# Patient Record
Sex: FEMALE | Race: WHITE | NOT HISPANIC OR LATINO | Employment: FULL TIME | URBAN - METROPOLITAN AREA
[De-identification: names, ages, dates, MRNs, and addresses within clinical notes are randomized per-mention and may not be internally consistent; named-entity substitution may affect disease eponyms.]

---

## 2017-01-17 ENCOUNTER — PRENATAL OFFICE VISIT - HEALTHEAST (OUTPATIENT)
Dept: MIDWIFE SERVICES | Facility: CLINIC | Age: 27
End: 2017-01-17

## 2017-01-17 DIAGNOSIS — Z34.00 SUPERVISION OF NORMAL FIRST PREGNANCY: ICD-10-CM

## 2017-01-17 ASSESSMENT — MIFFLIN-ST. JEOR: SCORE: 1273.13

## 2017-02-17 ENCOUNTER — HOSPITAL ENCOUNTER (OUTPATIENT)
Dept: ULTRASOUND IMAGING | Facility: CLINIC | Age: 27
Discharge: HOME | End: 2017-02-17
Attending: MIDWIFE

## 2017-02-17 DIAGNOSIS — Z34.00 SUPERVISION OF NORMAL FIRST PREGNANCY: ICD-10-CM

## 2017-02-20 ENCOUNTER — AMBULATORY - HEALTHEAST (OUTPATIENT)
Dept: OBGYN | Facility: CLINIC | Age: 27
End: 2017-02-20

## 2017-02-27 ENCOUNTER — PRENATAL OFFICE VISIT - HEALTHEAST (OUTPATIENT)
Dept: MIDWIFE SERVICES | Facility: CLINIC | Age: 27
End: 2017-02-27

## 2017-02-27 DIAGNOSIS — J06.9 UPPER RESPIRATORY INFECTION: ICD-10-CM

## 2017-02-27 DIAGNOSIS — Z34.02 ENCOUNTER FOR SUPERVISION OF NORMAL FIRST PREGNANCY IN SECOND TRIMESTER: ICD-10-CM

## 2017-02-27 ASSESSMENT — MIFFLIN-ST. JEOR: SCORE: 1320.76

## 2017-03-20 ENCOUNTER — PRENATAL OFFICE VISIT - HEALTHEAST (OUTPATIENT)
Dept: MIDWIFE SERVICES | Facility: CLINIC | Age: 27
End: 2017-03-20

## 2017-03-20 DIAGNOSIS — Z34.02 ENCOUNTER FOR SUPERVISION OF NORMAL FIRST PREGNANCY IN SECOND TRIMESTER: ICD-10-CM

## 2017-03-20 ASSESSMENT — MIFFLIN-ST. JEOR: SCORE: 1347.97

## 2017-03-21 LAB — SYPHILIS RPR SCREEN - HISTORICAL: NORMAL

## 2017-04-17 ENCOUNTER — PRENATAL OFFICE VISIT - HEALTHEAST (OUTPATIENT)
Dept: MIDWIFE SERVICES | Facility: CLINIC | Age: 27
End: 2017-04-17

## 2017-04-17 DIAGNOSIS — Z34.02 ENCOUNTER FOR SUPERVISION OF NORMAL FIRST PREGNANCY IN SECOND TRIMESTER: ICD-10-CM

## 2017-04-17 DIAGNOSIS — R05.9 COUGH: ICD-10-CM

## 2017-04-17 ASSESSMENT — MIFFLIN-ST. JEOR: SCORE: 1366.12

## 2017-04-23 ENCOUNTER — COMMUNICATION - HEALTHEAST (OUTPATIENT)
Dept: MIDWIFE SERVICES | Facility: CLINIC | Age: 27
End: 2017-04-23

## 2017-04-24 ENCOUNTER — COMMUNICATION - HEALTHEAST (OUTPATIENT)
Dept: ADMINISTRATIVE | Facility: CLINIC | Age: 27
End: 2017-04-24

## 2017-05-01 ENCOUNTER — PRENATAL OFFICE VISIT - HEALTHEAST (OUTPATIENT)
Dept: MIDWIFE SERVICES | Facility: CLINIC | Age: 27
End: 2017-05-01

## 2017-05-01 DIAGNOSIS — Z34.02 ENCOUNTER FOR SUPERVISION OF NORMAL FIRST PREGNANCY IN SECOND TRIMESTER: ICD-10-CM

## 2017-05-01 ASSESSMENT — MIFFLIN-ST. JEOR: SCORE: 1366.12

## 2017-05-17 ENCOUNTER — PRENATAL OFFICE VISIT - HEALTHEAST (OUTPATIENT)
Dept: MIDWIFE SERVICES | Facility: CLINIC | Age: 27
End: 2017-05-17

## 2017-05-17 DIAGNOSIS — Z34.03 ENCOUNTER FOR SUPERVISION OF NORMAL FIRST PREGNANCY IN THIRD TRIMESTER: ICD-10-CM

## 2017-05-17 ASSESSMENT — MIFFLIN-ST. JEOR: SCORE: 1393.33

## 2017-05-24 ENCOUNTER — PRENATAL OFFICE VISIT - HEALTHEAST (OUTPATIENT)
Dept: MIDWIFE SERVICES | Facility: CLINIC | Age: 27
End: 2017-05-24

## 2017-05-24 ENCOUNTER — HOSPITAL ENCOUNTER (OUTPATIENT)
Dept: ULTRASOUND IMAGING | Facility: CLINIC | Age: 27
Discharge: HOME | End: 2017-05-24
Attending: ADVANCED PRACTICE MIDWIFE

## 2017-05-24 DIAGNOSIS — Z34.03 ENCOUNTER FOR SUPERVISION OF NORMAL FIRST PREGNANCY IN THIRD TRIMESTER: ICD-10-CM

## 2017-05-24 ASSESSMENT — MIFFLIN-ST. JEOR: SCORE: 1406.94

## 2017-05-25 ENCOUNTER — AMBULATORY - HEALTHEAST (OUTPATIENT)
Dept: OBGYN | Facility: CLINIC | Age: 27
End: 2017-05-25

## 2017-06-09 ENCOUNTER — PRENATAL OFFICE VISIT - HEALTHEAST (OUTPATIENT)
Dept: MIDWIFE SERVICES | Facility: CLINIC | Age: 27
End: 2017-06-09

## 2017-06-09 DIAGNOSIS — Z34.03 ENCOUNTER FOR SUPERVISION OF NORMAL FIRST PREGNANCY IN THIRD TRIMESTER: ICD-10-CM

## 2017-06-09 ASSESSMENT — MIFFLIN-ST. JEOR: SCORE: 1418.28

## 2017-06-15 ENCOUNTER — PRENATAL OFFICE VISIT - HEALTHEAST (OUTPATIENT)
Dept: MIDWIFE SERVICES | Facility: CLINIC | Age: 27
End: 2017-06-15

## 2017-06-15 DIAGNOSIS — Z34.03 ENCOUNTER FOR SUPERVISION OF NORMAL FIRST PREGNANCY IN THIRD TRIMESTER: ICD-10-CM

## 2017-06-15 ASSESSMENT — MIFFLIN-ST. JEOR: SCORE: 1425.09

## 2017-06-23 ENCOUNTER — AMBULATORY - HEALTHEAST (OUTPATIENT)
Dept: OBGYN | Facility: CLINIC | Age: 27
End: 2017-06-23

## 2017-06-23 ENCOUNTER — COMMUNICATION - HEALTHEAST (OUTPATIENT)
Dept: OBGYN | Facility: CLINIC | Age: 27
End: 2017-06-23

## 2017-06-23 ENCOUNTER — AMBULATORY - HEALTHEAST (OUTPATIENT)
Dept: MIDWIFE SERVICES | Facility: CLINIC | Age: 27
End: 2017-06-23

## 2017-06-23 ENCOUNTER — HOSPITAL ENCOUNTER (OUTPATIENT)
Dept: ULTRASOUND IMAGING | Facility: CLINIC | Age: 27
Discharge: HOME | End: 2017-06-23
Attending: ADVANCED PRACTICE MIDWIFE

## 2017-06-23 ENCOUNTER — PRENATAL OFFICE VISIT - HEALTHEAST (OUTPATIENT)
Dept: MIDWIFE SERVICES | Facility: CLINIC | Age: 27
End: 2017-06-23

## 2017-06-23 DIAGNOSIS — O48.0 POST-TERM PREGNANCY, 40-42 WEEKS OF GESTATION: ICD-10-CM

## 2017-06-23 DIAGNOSIS — Z21 HIV ANTIBODY POSITIVE (H): ICD-10-CM

## 2017-06-23 DIAGNOSIS — O26.843 SIGNIFICANT DISCREPANCY BETWEEN UTERINE SIZE AND CLINICAL DATES, ANTEPARTUM, THIRD TRIMESTER: ICD-10-CM

## 2017-06-23 DIAGNOSIS — Z34.03 ENCOUNTER FOR SUPERVISION OF NORMAL FIRST PREGNANCY IN THIRD TRIMESTER: ICD-10-CM

## 2017-06-23 ASSESSMENT — MIFFLIN-ST. JEOR: SCORE: 1443.23

## 2017-06-24 ENCOUNTER — COMMUNICATION - HEALTHEAST (OUTPATIENT)
Dept: OBGYN | Facility: CLINIC | Age: 27
End: 2017-06-24

## 2017-06-24 DIAGNOSIS — Z21 HIV ANTIBODY POSITIVE (H): ICD-10-CM

## 2017-06-24 DIAGNOSIS — O26.843 SIGNIFICANT DISCREPANCY BETWEEN UTERINE SIZE AND CLINICAL DATES, ANTEPARTUM, THIRD TRIMESTER: ICD-10-CM

## 2017-06-26 ENCOUNTER — HOME CARE/HOSPICE - HEALTHEAST (OUTPATIENT)
Dept: HOME HEALTH SERVICES | Facility: HOME HEALTH | Age: 27
End: 2017-06-26

## 2017-06-27 ENCOUNTER — HOME CARE/HOSPICE - HEALTHEAST (OUTPATIENT)
Dept: HOME HEALTH SERVICES | Facility: HOME HEALTH | Age: 27
End: 2017-06-27

## 2017-06-28 ENCOUNTER — HOME CARE/HOSPICE - HEALTHEAST (OUTPATIENT)
Dept: HOME HEALTH SERVICES | Facility: HOME HEALTH | Age: 27
End: 2017-06-28

## 2017-07-18 ENCOUNTER — COMMUNICATION - HEALTHEAST (OUTPATIENT)
Dept: MIDWIFE SERVICES | Facility: CLINIC | Age: 27
End: 2017-07-18

## 2017-07-31 ENCOUNTER — OFFICE VISIT - HEALTHEAST (OUTPATIENT)
Dept: MIDWIFE SERVICES | Facility: CLINIC | Age: 27
End: 2017-07-31

## 2017-07-31 DIAGNOSIS — Z30.09 BIRTH CONTROL COUNSELING: ICD-10-CM

## 2017-07-31 ASSESSMENT — MIFFLIN-ST. JEOR: SCORE: 1316.22

## 2018-01-21 ENCOUNTER — HEALTH MAINTENANCE LETTER (OUTPATIENT)
Age: 28
End: 2018-01-21

## 2018-07-30 ENCOUNTER — PRENATAL OFFICE VISIT - HEALTHEAST (OUTPATIENT)
Dept: MIDWIFE SERVICES | Facility: CLINIC | Age: 28
End: 2018-07-30

## 2018-07-30 DIAGNOSIS — Z34.81 ENCOUNTER FOR SUPERVISION OF OTHER NORMAL PREGNANCY IN FIRST TRIMESTER: ICD-10-CM

## 2018-07-30 DIAGNOSIS — R11.0 NAUSEA: ICD-10-CM

## 2018-07-30 DIAGNOSIS — R63.6 UNDERWEIGHT: ICD-10-CM

## 2018-07-30 ASSESSMENT — MIFFLIN-ST. JEOR: SCORE: 1235.71

## 2018-07-31 LAB
BASOPHILS # BLD AUTO: 0 THOU/UL (ref 0–0.2)
BASOPHILS NFR BLD AUTO: 0 % (ref 0–2)
EOSINOPHIL # BLD AUTO: 0.1 THOU/UL (ref 0–0.4)
EOSINOPHIL NFR BLD AUTO: 1 % (ref 0–6)
ERYTHROCYTE [DISTWIDTH] IN BLOOD BY AUTOMATED COUNT: 13.2 % (ref 11–14.5)
HCT VFR BLD AUTO: 40.8 % (ref 35–47)
HGB BLD-MCNC: 13.9 G/DL (ref 12–16)
HIV 1+2 AB+HIV1 P24 AG SERPL QL IA: NEGATIVE
LYMPHOCYTES # BLD AUTO: 1.9 THOU/UL (ref 0.8–4.4)
LYMPHOCYTES NFR BLD AUTO: 31 % (ref 20–40)
MCH RBC QN AUTO: 29.8 PG (ref 27–34)
MCHC RBC AUTO-ENTMCNC: 34.1 G/DL (ref 32–36)
MCV RBC AUTO: 87 FL (ref 80–100)
MONOCYTES # BLD AUTO: 0.6 THOU/UL (ref 0–0.9)
MONOCYTES NFR BLD AUTO: 9 % (ref 2–10)
NEUTROPHILS # BLD AUTO: 3.7 THOU/UL (ref 2–7.7)
NEUTROPHILS NFR BLD AUTO: 59 % (ref 50–70)
PLATELET # BLD AUTO: 217 THOU/UL (ref 140–440)
PMV BLD AUTO: 10.5 FL (ref 8.5–12.5)
RBC # BLD AUTO: 4.67 MILL/UL (ref 3.8–5.4)
WBC: 6.3 THOU/UL (ref 4–11)

## 2018-08-01 ENCOUNTER — HOSPITAL ENCOUNTER (OUTPATIENT)
Dept: ULTRASOUND IMAGING | Facility: CLINIC | Age: 28
Discharge: HOME | End: 2018-08-01
Attending: ADVANCED PRACTICE MIDWIFE

## 2018-08-01 ENCOUNTER — AMBULATORY - HEALTHEAST (OUTPATIENT)
Dept: OBGYN | Facility: CLINIC | Age: 28
End: 2018-08-01

## 2018-08-01 DIAGNOSIS — Z34.81 ENCOUNTER FOR SUPERVISION OF OTHER NORMAL PREGNANCY IN FIRST TRIMESTER: ICD-10-CM

## 2018-08-01 LAB
ABO/RH(D): NORMAL
ABORH REPEAT: NORMAL
ANTIBODY SCREEN: NEGATIVE
BACTERIA SPEC CULT: NO GROWTH
HBV SURFACE AG SERPL QL IA: NEGATIVE
RUBV IGG SERPL QL IA: POSITIVE
T PALLIDUM AB SER QL: NEGATIVE

## 2018-08-02 LAB
HIV 1+2 AB+HIV1P24 AG SERPLBLD IA.RAPID: ABNORMAL
HIV 2 AB SERPLBLD QL IA.RAPID: NONREACTIVE
HIV1 AB SERPLBLD QL IA.RAPID: ABNORMAL

## 2018-08-03 ENCOUNTER — COMMUNICATION - HEALTHEAST (OUTPATIENT)
Dept: ADMINISTRATIVE | Facility: CLINIC | Age: 28
End: 2018-08-03

## 2018-08-03 DIAGNOSIS — O09.899: ICD-10-CM

## 2018-08-06 ENCOUNTER — AMBULATORY - HEALTHEAST (OUTPATIENT)
Dept: LAB | Facility: CLINIC | Age: 28
End: 2018-08-06

## 2018-08-06 DIAGNOSIS — O09.899: ICD-10-CM

## 2018-08-10 LAB
HIV1 RNA # PLAS NAA DL=20: NORMAL {COPIES}/ML
HIV1 RNA SERPL NAA+PROBE-LOG#: NORMAL {LOG_COPIES}/ML

## 2018-09-10 ENCOUNTER — PRENATAL OFFICE VISIT - HEALTHEAST (OUTPATIENT)
Dept: MIDWIFE SERVICES | Facility: CLINIC | Age: 28
End: 2018-09-10

## 2018-09-10 DIAGNOSIS — Z34.82 ENCOUNTER FOR SUPERVISION OF OTHER NORMAL PREGNANCY, SECOND TRIMESTER: ICD-10-CM

## 2018-09-10 ASSESSMENT — MIFFLIN-ST. JEOR: SCORE: 1260.65

## 2018-10-03 ENCOUNTER — HOSPITAL ENCOUNTER (OUTPATIENT)
Dept: ULTRASOUND IMAGING | Facility: CLINIC | Age: 28
Discharge: HOME | End: 2018-10-03
Attending: ADVANCED PRACTICE MIDWIFE

## 2018-10-03 DIAGNOSIS — Z34.82 ENCOUNTER FOR SUPERVISION OF OTHER NORMAL PREGNANCY, SECOND TRIMESTER: ICD-10-CM

## 2018-10-04 ENCOUNTER — AMBULATORY - HEALTHEAST (OUTPATIENT)
Dept: OBGYN | Facility: CLINIC | Age: 28
End: 2018-10-04

## 2018-10-29 ENCOUNTER — PRENATAL OFFICE VISIT - HEALTHEAST (OUTPATIENT)
Dept: MIDWIFE SERVICES | Facility: CLINIC | Age: 28
End: 2018-10-29

## 2018-10-29 DIAGNOSIS — R63.6 UNDERWEIGHT: ICD-10-CM

## 2018-10-29 DIAGNOSIS — Z34.82 ENCOUNTER FOR SUPERVISION OF OTHER NORMAL PREGNANCY, SECOND TRIMESTER: ICD-10-CM

## 2018-10-29 ASSESSMENT — MIFFLIN-ST. JEOR: SCORE: 1321.89

## 2018-11-12 ENCOUNTER — PRENATAL OFFICE VISIT - HEALTHEAST (OUTPATIENT)
Dept: MIDWIFE SERVICES | Facility: CLINIC | Age: 28
End: 2018-11-12

## 2018-11-12 DIAGNOSIS — Z34.82 ENCOUNTER FOR SUPERVISION OF OTHER NORMAL PREGNANCY IN SECOND TRIMESTER: ICD-10-CM

## 2018-11-12 LAB
FASTING STATUS PATIENT QL REPORTED: NO
GLUCOSE 1H P 50 G GLC PO SERPL-MCNC: 155 MG/DL (ref 70–139)
HGB BLD-MCNC: 11.7 G/DL (ref 12–16)

## 2018-11-12 ASSESSMENT — MIFFLIN-ST. JEOR: SCORE: 1343.21

## 2018-11-16 ENCOUNTER — AMBULATORY - HEALTHEAST (OUTPATIENT)
Dept: LAB | Facility: CLINIC | Age: 28
End: 2018-11-16

## 2018-11-16 DIAGNOSIS — R73.09 ELEVATED GLUCOSE: ICD-10-CM

## 2018-11-16 LAB
FASTING STATUS PATIENT QL REPORTED: YES
GLUCOSE 1H P 100 G GLC PO SERPL-MCNC: 150 MG/DL
GLUCOSE 2H P 100 G GLC PO SERPL-MCNC: 120 MG/DL
GLUCOSE 3H P 100 G GLC PO SERPL-MCNC: 96 MG/DL
GLUCOSE P FAST SERPL-MCNC: 62 MG/DL

## 2018-12-17 ENCOUNTER — PRENATAL OFFICE VISIT - HEALTHEAST (OUTPATIENT)
Dept: MIDWIFE SERVICES | Facility: CLINIC | Age: 28
End: 2018-12-17

## 2018-12-17 DIAGNOSIS — N89.8 VAGINAL DISCHARGE: ICD-10-CM

## 2018-12-17 DIAGNOSIS — Z34.83 ENCOUNTER FOR SUPERVISION OF OTHER NORMAL PREGNANCY, THIRD TRIMESTER: ICD-10-CM

## 2018-12-17 LAB
CLUE CELLS: NORMAL
TRICHOMONAS, WET PREP: NORMAL
YEAST, WET PREP: NORMAL

## 2018-12-17 ASSESSMENT — MIFFLIN-ST. JEOR: SCORE: 1383.13

## 2019-01-14 ENCOUNTER — PRENATAL OFFICE VISIT - HEALTHEAST (OUTPATIENT)
Dept: MIDWIFE SERVICES | Facility: CLINIC | Age: 29
End: 2019-01-14

## 2019-01-14 DIAGNOSIS — Z34.83 ENCOUNTER FOR SUPERVISION OF OTHER NORMAL PREGNANCY, THIRD TRIMESTER: ICD-10-CM

## 2019-01-14 ASSESSMENT — MIFFLIN-ST. JEOR: SCORE: 1423.95

## 2019-01-21 ENCOUNTER — PRENATAL OFFICE VISIT - HEALTHEAST (OUTPATIENT)
Dept: MIDWIFE SERVICES | Facility: CLINIC | Age: 29
End: 2019-01-21

## 2019-01-21 DIAGNOSIS — R63.6 UNDERWEIGHT: ICD-10-CM

## 2019-01-21 DIAGNOSIS — Z34.83 ENCOUNTER FOR SUPERVISION OF OTHER NORMAL PREGNANCY, THIRD TRIMESTER: ICD-10-CM

## 2019-01-21 DIAGNOSIS — O26.843 FUNDAL HEIGHT LOW FOR DATES IN THIRD TRIMESTER: ICD-10-CM

## 2019-01-21 LAB — HGB BLD-MCNC: 11.6 G/DL (ref 12–16)

## 2019-01-21 ASSESSMENT — MIFFLIN-ST. JEOR: SCORE: 1426.22

## 2019-01-22 LAB
ALLERGIC TO PENICILLIN: NO
GP B STREP DNA SPEC QL NAA+PROBE: NEGATIVE

## 2019-01-25 ENCOUNTER — HOSPITAL ENCOUNTER (OUTPATIENT)
Dept: ULTRASOUND IMAGING | Facility: CLINIC | Age: 29
Discharge: HOME | End: 2019-01-25
Attending: ADVANCED PRACTICE MIDWIFE

## 2019-01-25 DIAGNOSIS — O26.843 FUNDAL HEIGHT LOW FOR DATES IN THIRD TRIMESTER: ICD-10-CM

## 2019-01-26 ENCOUNTER — AMBULATORY - HEALTHEAST (OUTPATIENT)
Dept: OBGYN | Facility: CLINIC | Age: 29
End: 2019-01-26

## 2019-01-26 ENCOUNTER — COMMUNICATION - HEALTHEAST (OUTPATIENT)
Dept: OBGYN | Facility: CLINIC | Age: 29
End: 2019-01-26

## 2019-01-26 ENCOUNTER — COMMUNICATION - HEALTHEAST (OUTPATIENT)
Dept: MIDWIFE SERVICES | Facility: CLINIC | Age: 29
End: 2019-01-26

## 2019-01-28 ENCOUNTER — PRENATAL OFFICE VISIT - HEALTHEAST (OUTPATIENT)
Dept: MIDWIFE SERVICES | Facility: CLINIC | Age: 29
End: 2019-01-28

## 2019-01-28 DIAGNOSIS — Z34.83 ENCOUNTER FOR SUPERVISION OF OTHER NORMAL PREGNANCY, THIRD TRIMESTER: ICD-10-CM

## 2019-01-28 ASSESSMENT — MIFFLIN-ST. JEOR: SCORE: 1439.83

## 2019-02-04 ENCOUNTER — PRENATAL OFFICE VISIT - HEALTHEAST (OUTPATIENT)
Dept: MIDWIFE SERVICES | Facility: CLINIC | Age: 29
End: 2019-02-04

## 2019-02-04 DIAGNOSIS — Z34.83 ENCOUNTER FOR SUPERVISION OF OTHER NORMAL PREGNANCY, THIRD TRIMESTER: ICD-10-CM

## 2019-02-04 ASSESSMENT — MIFFLIN-ST. JEOR: SCORE: 1433.93

## 2019-02-14 ENCOUNTER — PRENATAL OFFICE VISIT - HEALTHEAST (OUTPATIENT)
Dept: MIDWIFE SERVICES | Facility: CLINIC | Age: 29
End: 2019-02-14

## 2019-02-14 DIAGNOSIS — Z34.83 ENCOUNTER FOR SUPERVISION OF OTHER NORMAL PREGNANCY, THIRD TRIMESTER: ICD-10-CM

## 2019-02-14 ASSESSMENT — MIFFLIN-ST. JEOR: SCORE: 1447.08

## 2019-02-18 ENCOUNTER — HOME CARE/HOSPICE - HEALTHEAST (OUTPATIENT)
Dept: HOME HEALTH SERVICES | Facility: HOME HEALTH | Age: 29
End: 2019-02-18

## 2019-02-21 ENCOUNTER — HOME CARE/HOSPICE - HEALTHEAST (OUTPATIENT)
Dept: HOME HEALTH SERVICES | Facility: HOME HEALTH | Age: 29
End: 2019-02-21

## 2019-02-21 ENCOUNTER — COMMUNICATION - HEALTHEAST (OUTPATIENT)
Dept: MIDWIFE SERVICES | Facility: CLINIC | Age: 29
End: 2019-02-21

## 2019-02-26 ENCOUNTER — OFFICE VISIT - HEALTHEAST (OUTPATIENT)
Dept: MIDWIFE SERVICES | Facility: CLINIC | Age: 29
End: 2019-02-26

## 2019-02-26 ASSESSMENT — MIFFLIN-ST. JEOR: SCORE: 1376.32

## 2019-03-26 ENCOUNTER — COMMUNICATION - HEALTHEAST (OUTPATIENT)
Dept: MIDWIFE SERVICES | Facility: CLINIC | Age: 29
End: 2019-03-26

## 2019-04-03 ENCOUNTER — OFFICE VISIT - HEALTHEAST (OUTPATIENT)
Dept: MIDWIFE SERVICES | Facility: CLINIC | Age: 29
End: 2019-04-03

## 2019-04-03 DIAGNOSIS — Z30.430 ENCOUNTER FOR IUD INSERTION: ICD-10-CM

## 2019-04-03 DIAGNOSIS — Z12.4 PAP SMEAR FOR CERVICAL CANCER SCREENING: ICD-10-CM

## 2019-04-03 ASSESSMENT — MIFFLIN-ST. JEOR: SCORE: 1340.03

## 2019-04-05 ENCOUNTER — AMBULATORY - HEALTHEAST (OUTPATIENT)
Dept: LAB | Facility: CLINIC | Age: 29
End: 2019-04-05

## 2019-04-05 LAB
ALT SERPL W P-5'-P-CCNC: 50 U/L (ref 0–45)
AST SERPL W P-5'-P-CCNC: 28 U/L (ref 0–40)
BUN SERPL-MCNC: 12 MG/DL (ref 8–22)
CREAT SERPL-MCNC: 0.83 MG/DL (ref 0.6–1.1)
ERYTHROCYTE [DISTWIDTH] IN BLOOD BY AUTOMATED COUNT: 11.6 % (ref 11–14.5)
GFR SERPL CREATININE-BSD FRML MDRD: >60 ML/MIN/1.73M2
HCT VFR BLD AUTO: 40.8 % (ref 35–47)
HGB BLD-MCNC: 13.6 G/DL (ref 12–16)
MCH RBC QN AUTO: 29 PG (ref 27–34)
MCHC RBC AUTO-ENTMCNC: 33.4 G/DL (ref 32–36)
MCV RBC AUTO: 87 FL (ref 80–100)
PLATELET # BLD AUTO: 232 THOU/UL (ref 140–440)
PMV BLD AUTO: 7.2 FL (ref 7–10)
RBC # BLD AUTO: 4.69 MILL/UL (ref 3.8–5.4)
WBC: 4.9 THOU/UL (ref 4–11)

## 2019-04-08 LAB
BKR LAB AP ABNORMAL BLEEDING: NO
BKR LAB AP BIRTH CONTROL/HORMONES: NORMAL
BKR LAB AP CERVICAL APPEARANCE: NORMAL
BKR LAB AP GYN ADEQUACY: NORMAL
BKR LAB AP GYN INTERPRETATION: NORMAL
BKR LAB AP HPV REFLEX: NORMAL
BKR LAB AP LMP: NORMAL
BKR LAB AP PATIENT STATUS: NORMAL
BKR LAB AP PREVIOUS ABNORMAL: NORMAL
BKR LAB AP PREVIOUS NORMAL: 2016
HIGH RISK?: NO
PATH REPORT.COMMENTS IMP SPEC: NORMAL
RESULT FLAG (HE HISTORICAL CONVERSION): NORMAL

## 2019-04-29 ENCOUNTER — OFFICE VISIT - HEALTHEAST (OUTPATIENT)
Dept: MIDWIFE SERVICES | Facility: CLINIC | Age: 29
End: 2019-04-29

## 2019-04-29 DIAGNOSIS — Z30.431 ENCOUNTER FOR ROUTINE CHECKING OF INTRAUTERINE CONTRACEPTIVE DEVICE (IUD): ICD-10-CM

## 2019-04-29 DIAGNOSIS — Z97.5 IUD (INTRAUTERINE DEVICE) IN PLACE: ICD-10-CM

## 2019-04-29 DIAGNOSIS — R74.01 ELEVATED ALT MEASUREMENT: ICD-10-CM

## 2019-04-29 LAB
ALBUMIN SERPL-MCNC: 4.1 G/DL (ref 3.5–5)
ALP SERPL-CCNC: 40 U/L (ref 45–120)
ALT SERPL W P-5'-P-CCNC: 110 U/L (ref 0–45)
ANION GAP SERPL CALCULATED.3IONS-SCNC: 12 MMOL/L (ref 5–18)
AST SERPL W P-5'-P-CCNC: 51 U/L (ref 0–40)
BILIRUB SERPL-MCNC: 0.4 MG/DL (ref 0–1)
BUN SERPL-MCNC: 15 MG/DL (ref 8–22)
CALCIUM SERPL-MCNC: 9.9 MG/DL (ref 8.5–10.5)
CHLORIDE BLD-SCNC: 103 MMOL/L (ref 98–107)
CO2 SERPL-SCNC: 25 MMOL/L (ref 22–31)
CREAT SERPL-MCNC: 0.77 MG/DL (ref 0.6–1.1)
GFR SERPL CREATININE-BSD FRML MDRD: >60 ML/MIN/1.73M2
GLUCOSE BLD-MCNC: 75 MG/DL (ref 70–125)
POTASSIUM BLD-SCNC: 4.2 MMOL/L (ref 3.5–5)
PROT SERPL-MCNC: 6.6 G/DL (ref 6–8)
SODIUM SERPL-SCNC: 140 MMOL/L (ref 136–145)

## 2019-04-29 ASSESSMENT — MIFFLIN-ST. JEOR: SCORE: 1320.98

## 2019-04-30 ENCOUNTER — AMBULATORY - HEALTHEAST (OUTPATIENT)
Dept: OBGYN | Facility: CLINIC | Age: 29
End: 2019-04-30

## 2019-04-30 DIAGNOSIS — R74.8 ELEVATED LIVER ENZYMES: ICD-10-CM

## 2019-05-15 ENCOUNTER — OFFICE VISIT - HEALTHEAST (OUTPATIENT)
Dept: INTERNAL MEDICINE | Facility: CLINIC | Age: 29
End: 2019-05-15

## 2019-05-15 DIAGNOSIS — R74.01 ELEVATED ALT MEASUREMENT: ICD-10-CM

## 2019-05-15 ASSESSMENT — MIFFLIN-ST. JEOR: SCORE: 1326.43

## 2019-05-16 ENCOUNTER — HOSPITAL ENCOUNTER (OUTPATIENT)
Dept: ULTRASOUND IMAGING | Facility: CLINIC | Age: 29
Discharge: HOME | End: 2019-05-16

## 2019-05-16 ENCOUNTER — COMMUNICATION - HEALTHEAST (OUTPATIENT)
Dept: INTERNAL MEDICINE | Facility: CLINIC | Age: 29
End: 2019-05-16

## 2019-05-16 DIAGNOSIS — R74.02 ELEVATION OF LEVEL OF TRANSAMINASE AND LACTIC ACID DEHYDROGENASE (LDH): ICD-10-CM

## 2019-05-16 DIAGNOSIS — R74.01 ELEVATION OF LEVEL OF TRANSAMINASE AND LACTIC ACID DEHYDROGENASE (LDH): ICD-10-CM

## 2019-05-16 DIAGNOSIS — R74.01 ELEVATED ALT MEASUREMENT: ICD-10-CM

## 2019-05-16 LAB
ALBUMIN SERPL-MCNC: 4.2 G/DL (ref 3.5–5)
ALP SERPL-CCNC: 43 U/L (ref 45–120)
ALT SERPL W P-5'-P-CCNC: 44 U/L (ref 0–45)
ANION GAP SERPL CALCULATED.3IONS-SCNC: 11 MMOL/L (ref 5–18)
AST SERPL W P-5'-P-CCNC: 27 U/L (ref 0–40)
BILIRUB SERPL-MCNC: 0.2 MG/DL (ref 0–1)
BUN SERPL-MCNC: 12 MG/DL (ref 8–22)
CALCIUM SERPL-MCNC: 9.9 MG/DL (ref 8.5–10.5)
CHLORIDE BLD-SCNC: 104 MMOL/L (ref 98–107)
CO2 SERPL-SCNC: 25 MMOL/L (ref 22–31)
CREAT SERPL-MCNC: 0.81 MG/DL (ref 0.6–1.1)
FERRITIN SERPL-MCNC: 16 NG/ML (ref 10–130)
GFR SERPL CREATININE-BSD FRML MDRD: >60 ML/MIN/1.73M2
GLUCOSE BLD-MCNC: 83 MG/DL (ref 70–125)
IRON SATN MFR SERPL: 30 % (ref 20–50)
IRON SERPL-MCNC: 114 UG/DL (ref 42–175)
POTASSIUM BLD-SCNC: 4.6 MMOL/L (ref 3.5–5)
PROT SERPL-MCNC: 6.6 G/DL (ref 6–8)
SODIUM SERPL-SCNC: 140 MMOL/L (ref 136–145)
TIBC SERPL-MCNC: 385 UG/DL (ref 313–563)
TRANSFERRIN SERPL-MCNC: 308 MG/DL (ref 212–360)
TSH SERPL DL<=0.005 MIU/L-ACNC: 0.94 UIU/ML (ref 0.3–5)

## 2019-05-17 LAB
HBV SURFACE AG SERPL QL IA: NEGATIVE
HCV AB SERPL QL IA: NEGATIVE
HEPATITIS B SURFACE ANTIBODY LHE- HISTORICAL: POSITIVE

## 2019-05-18 LAB — SMA IGG SER-ACNC: 11 UNITS (ref 0–19)

## 2019-05-20 ENCOUNTER — COMMUNICATION - HEALTHEAST (OUTPATIENT)
Dept: INTERNAL MEDICINE | Facility: CLINIC | Age: 29
End: 2019-05-20

## 2019-05-20 DIAGNOSIS — R79.89 ABNORMAL LFTS (LIVER FUNCTION TESTS): ICD-10-CM

## 2019-05-20 LAB
TTG IGA SER-ACNC: <0.1 U/ML
TTG IGG SER-ACNC: <0.6 U/ML

## 2019-06-18 ENCOUNTER — AMBULATORY - HEALTHEAST (OUTPATIENT)
Dept: LAB | Facility: CLINIC | Age: 29
End: 2019-06-18

## 2019-06-18 DIAGNOSIS — R79.89 ABNORMAL LFTS (LIVER FUNCTION TESTS): ICD-10-CM

## 2019-06-18 LAB
ALP SERPL-CCNC: 40 U/L (ref 45–120)
ALT SERPL W P-5'-P-CCNC: 30 U/L (ref 0–45)
AST SERPL W P-5'-P-CCNC: 19 U/L (ref 0–40)

## 2020-03-11 ENCOUNTER — HEALTH MAINTENANCE LETTER (OUTPATIENT)
Age: 30
End: 2020-03-11

## 2020-06-22 ENCOUNTER — RECORDS - HEALTHEAST (OUTPATIENT)
Dept: ADMINISTRATIVE | Facility: OTHER | Age: 30
End: 2020-06-22

## 2020-07-02 ENCOUNTER — RECORDS - HEALTHEAST (OUTPATIENT)
Dept: ADMINISTRATIVE | Facility: OTHER | Age: 30
End: 2020-07-02

## 2020-09-04 ENCOUNTER — OFFICE VISIT - HEALTHEAST (OUTPATIENT)
Dept: MIDWIFE SERVICES | Facility: CLINIC | Age: 30
End: 2020-09-04

## 2020-09-04 DIAGNOSIS — Z30.432 ENCOUNTER FOR IUD REMOVAL: ICD-10-CM

## 2020-09-04 DIAGNOSIS — Z97.5 IUD (INTRAUTERINE DEVICE) IN PLACE: ICD-10-CM

## 2020-09-04 ASSESSMENT — MIFFLIN-ST. JEOR: SCORE: 1312.82

## 2020-09-28 ENCOUNTER — AMBULATORY - HEALTHEAST (OUTPATIENT)
Dept: FAMILY MEDICINE | Facility: CLINIC | Age: 30
End: 2020-09-28

## 2020-09-28 ENCOUNTER — VIRTUAL VISIT (OUTPATIENT)
Dept: FAMILY MEDICINE | Facility: OTHER | Age: 30
End: 2020-09-28
Payer: COMMERCIAL

## 2020-09-28 DIAGNOSIS — Z20.822 SUSPECTED COVID-19 VIRUS INFECTION: ICD-10-CM

## 2020-09-28 PROCEDURE — 99421 OL DIG E/M SVC 5-10 MIN: CPT | Performed by: PHYSICIAN ASSISTANT

## 2020-09-28 NOTE — PROGRESS NOTES
"Date: 2020 14:44:40  Clinician: Abilio Cohen  Clinician NPI: [id number]  Patient: ZAHRA CARDOZA  Patient : 1990  Patient Address: [address] [place], [place] [address] [address]  Patient Phone: [phone]  Visit Protocol: URI  Patient Summary:  ZAHRA is a 30 year old ( : 1990 ) female who initiated a [facility] Visit for COVID-19 (Coronavirus) evaluation and screening. When asked the question \"Please sign me up to receive news, health information and promotions from [facility].\", ZAHRA responded \"No\".    ZAHRA states her symptoms started today.   Her symptoms consist of a cough and a sore throat.   Symptom details     Cough: ZAHRA coughs a few times an hour and her cough is not more bothersome at night. Phlegm comes into her throat when she coughs. She believes her cough is caused by post-nasal drip. The color of the phlegm is clear.     Sore throat: ZAHRA reports having mild throat pain (1-3 on a 10 point pain scale), does not have exudate on her tonsils, and can swallow liquids. The lymph nodes in her neck are not enlarged. A rash has not appeared on the skin since the sore throat started.      ZAHRA denies having nasal congestion, headache, ear pain, anosmia, vomiting, rhinitis, nausea, wheezing, enlarged lymph nodes, facial pain or pressure, fever, myalgias, chills, malaise, teeth pain, ageusia, and diarrhea. She also denies taking antibiotic medication in the past month and having recent facial or sinus surgery in the past 60 days. She is not experiencing dyspnea.   Precipitating events  Within the past week, ZAHRA has not been exposed to someone with strep throat. She has not recently been exposed to someone with influenza. ZAHRA has been in close contact with the following high risk individuals: children under the age of 5.   Pertinent COVID-19 (Coronavirus) information  In the past 14 days, ZAHRA has not worked in a congregate living setting.   She either works or volunteers as a healthcare " worker or a , or works or volunteers in a healthcare facility. 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 She lives with a healthcare worker.   ZAHRA has not had a close contact with a laboratory-confirmed COVID-19 patient within 14 days of symptom onset.   Since December 2019, ZAHRA and has not had upper respiratory infection or influenza-like illness. Has not been diagnosed with lab-confirmed COVID-19 test   Pertinent medical history  ZAHRA does not get yeast infections when she takes antibiotics.   ZAHRA does not need a return to work/school note.   Weight: 130 lbs   ZAHRA does not smoke or use smokeless tobacco.   She is not sure if she is pregnant and denies breastfeeding. Her last period was over a month ago.   Additional information as reported by the patient (free text):  provider is currently being tested for COVID and 1.5 year old in my home is having symptoms of runny nose/cough and being tested.   Weight: 130 lbs    MEDICATIONS: No current medications, ALLERGIES: NKDA  Clinician Response:  Dear ZAHRA,   Your symptoms show that you may have coronavirus (COVID-19). This illness can cause fever, cough and trouble breathing. Many people get a mild case and get better on their own. Some people can get very sick.  What should I do?  We would like to test you for this virus.   1. Please call [phone] to schedule your visit. Explain that you were referred by [facility] to have a COVID-19 test. Be ready to share your [facility] visit ID number.  The following will serve as your written order for this COVID Test, ordered by me, for the indication of suspected COVID [Z20.828]: The test will be ordered in [facility], our electronic health record, after you are scheduled. It will show as ordered and authorized  "by Pino Barrera MD.  Order: COVID-19 (Coronavirus) PCR for SYMPTOMATIC testing from [facility].      2. When it's time for your COVID test:  Stay at least 6 feet away from others. (If someone will drive you to your test, stay in the backseat, as far away from the  as you can.)   Cover your mouth and nose with a mask, tissue or washcloth.  Go straight to the testing site. Don't make any stops on the way there or back.      3.Starting now: Stay home and away from others (self-isolate) until:   You've had no fever---and no medicine that reduces fever---for one full day (24 hours). And...   Your other symptoms have gotten better. For example, your cough or breathing has improved. And...   At least 10 days have passed since your symptoms started.       During this time, don't leave the house except for testing or medical care.   Stay in your own room, even for meals. Use your own bathroom if you can.   Stay away from others in your home. No hugging, kissing or shaking hands. No visitors.  Don't go to work, school or anywhere else.    Clean \"high touch\" surfaces often (doorknobs, counters, handles, etc.). Use a household cleaning spray or wipes. You'll find a full list of  on the EPA website: www.epa.gov/pesticide-registration/list-n-disinfectants-use-against-sars-cov-2.   Cover your mouth and nose with a mask, tissue or washcloth to avoid spreading germs.  Wash your hands and face often. Use soap and water.  Caregivers in these groups are at risk for severe illness due to COVID-19:  o People 65 years and older  o People who live in a nursing home or long-term care facility  o People with chronic disease (lung, heart, cancer, diabetes, kidney, liver, immunologic)  o People who have a weakened immune system, including those who:   Are in cancer treatment  Take medicine that weakens the immune system, such as corticosteroids  Had a bone marrow or organ transplant  Have an immune deficiency  Have poorly controlled " HIV or AIDS  Are obese (body mass index of 40 or higher)  Smoke regularly   o Caregivers should wear gloves while washing dishes, handling laundry and cleaning bedrooms and bathrooms.  o Use caution when washing and drying laundry: Don't shake dirty laundry, and use the warmest water setting that you can.  o For more tips, go to www.cdc.gov/coronavirus/2019-ncov/downloads/10Things.pdf.    How can I take care of myself?    Get lots of rest. Drink extra fluids (unless a doctor has told you not to).   Take Tylenol (acetaminophen) for fever or pain. If you have liver or kidney problems, ask your family doctor if it's okay to take Tylenol.   Adults can take either:    650 mg (two 325 mg pills) every 4 to 6 hours, or...   1,000 mg (two 500 mg pills) every 8 hours as needed.    Note: Don't take more than 3,000 mg in one day. Acetaminophen is found in many medicines (both prescribed and over-the-counter medicines). Read all labels to be sure you don't take too much.   For children, check the Tylenol bottle for the right dose. The dose is based on the child's age or weight.    If you have other health problems (like cancer, heart failure, an organ transplant or severe kidney disease): Call your specialty clinic if you don't feel better in the next 2 days.       Know when to call 911. Emergency warning signs include:    Trouble breathing or shortness of breath Pain or pressure in the chest that doesn't go away Feeling confused like you haven't felt before, or not being able to wake up Bluish-colored lips or face.  Where can I get more information?    [facility] -- About COVID-19: [email or web address]   CDC -- What to Do If You're Sick: www.cdc.gov/coronavirus/2019-ncov/about/steps-when-sick.html   CDC -- Ending Home Isolation: www.cdc.gov/coronavirus/2019-ncov/hcp/disposition-in-home-patients.html   CDC -- Caring for Someone: www.cdc.gov/coronavirus/2019-ncov/if-you-are-sick/care-for-someone.html   [facility] --  Interim Guidance for Hospital Discharge to Home: www.health.[facility].[place]/diseases/coronavirus/hcp/hospdischarge.pdf   [facility] clinical trials (COVID-19 research studies): clinicalaffairs.[facility]/umn-clinical-trials    Below are the COVID-19 hotlines at the [place] Department of Health ([facility]). Interpreters are available.    For health questions: Call 802-123-5993 or 1-283.523.2681 (7 a.m. to 7 p.m.) For questions about schools and childcare: Call 262-616-2259 or 1-178.435.6944 (7 a.m. to 7 p.m.)    Diagnosis: Pain in throat  Diagnosis ICD: R07.0

## 2020-09-29 ENCOUNTER — AMBULATORY - HEALTHEAST (OUTPATIENT)
Dept: FAMILY MEDICINE | Facility: CLINIC | Age: 30
End: 2020-09-29

## 2020-09-29 DIAGNOSIS — Z20.822 SUSPECTED COVID-19 VIRUS INFECTION: ICD-10-CM

## 2020-10-01 ENCOUNTER — COMMUNICATION - HEALTHEAST (OUTPATIENT)
Dept: SCHEDULING | Facility: CLINIC | Age: 30
End: 2020-10-01

## 2020-12-27 ENCOUNTER — HEALTH MAINTENANCE LETTER (OUTPATIENT)
Age: 30
End: 2020-12-27

## 2021-01-22 ENCOUNTER — COMMUNICATION - HEALTHEAST (OUTPATIENT)
Dept: MIDWIFE SERVICES | Facility: CLINIC | Age: 31
End: 2021-01-22

## 2021-01-23 ENCOUNTER — AMBULATORY - HEALTHEAST (OUTPATIENT)
Dept: OBGYN | Facility: CLINIC | Age: 31
End: 2021-01-23

## 2021-01-23 DIAGNOSIS — O21.9 NAUSEA AND VOMITING IN PREGNANCY: ICD-10-CM

## 2021-01-26 ENCOUNTER — COMMUNICATION - HEALTHEAST (OUTPATIENT)
Dept: OBGYN | Facility: CLINIC | Age: 31
End: 2021-01-26

## 2021-02-01 ENCOUNTER — OFFICE VISIT (OUTPATIENT)
Dept: URGENT CARE | Facility: URGENT CARE | Age: 31
End: 2021-02-01
Payer: COMMERCIAL

## 2021-02-01 ENCOUNTER — OFFICE VISIT (OUTPATIENT)
Dept: PEDIATRICS | Facility: CLINIC | Age: 31
End: 2021-02-01
Payer: COMMERCIAL

## 2021-02-01 VITALS
OXYGEN SATURATION: 99 % | HEART RATE: 70 BPM | DIASTOLIC BLOOD PRESSURE: 74 MMHG | SYSTOLIC BLOOD PRESSURE: 109 MMHG | TEMPERATURE: 97.7 F

## 2021-02-01 VITALS
OXYGEN SATURATION: 98 % | DIASTOLIC BLOOD PRESSURE: 71 MMHG | SYSTOLIC BLOOD PRESSURE: 107 MMHG | WEIGHT: 120.6 LBS | TEMPERATURE: 99.1 F | HEART RATE: 84 BPM

## 2021-02-01 DIAGNOSIS — O21.0 HYPEREMESIS GRAVIDARUM: Primary | ICD-10-CM

## 2021-02-01 DIAGNOSIS — Z34.90 PREGNANCY, UNSPECIFIED GESTATIONAL AGE: ICD-10-CM

## 2021-02-01 DIAGNOSIS — R11.2 INTRACTABLE VOMITING WITH NAUSEA, UNSPECIFIED VOMITING TYPE: Primary | ICD-10-CM

## 2021-02-01 DIAGNOSIS — R11.2 INTRACTABLE VOMITING WITH NAUSEA, UNSPECIFIED VOMITING TYPE: ICD-10-CM

## 2021-02-01 DIAGNOSIS — O21.0 HYPEREMESIS GRAVIDARUM: ICD-10-CM

## 2021-02-01 LAB
ALBUMIN UR-MCNC: NEGATIVE MG/DL
ANION GAP SERPL CALCULATED.3IONS-SCNC: 5 MMOL/L (ref 6–17)
APPEARANCE UR: ABNORMAL
BACTERIA #/AREA URNS HPF: ABNORMAL /HPF
BASOPHILS # BLD AUTO: 0 10E9/L (ref 0–0.2)
BASOPHILS NFR BLD AUTO: 0.3 %
BILIRUB UR QL STRIP: NEGATIVE
BUN SERPL-MCNC: 9 MG/DL (ref 7–30)
CALCIUM SERPL-MCNC: 9.4 MG/DL (ref 8.5–10.1)
CHLORIDE SERPL-SCNC: 102 MMOL/L (ref 94–109)
CO2 SERPL-SCNC: 28 MMOL/L (ref 20–32)
COLOR UR AUTO: YELLOW
CREAT SERPL-MCNC: 0.6 MG/DL (ref 0.52–1.04)
DIFFERENTIAL METHOD BLD: NORMAL
EOSINOPHIL # BLD AUTO: 0 10E9/L (ref 0–0.7)
EOSINOPHIL NFR BLD AUTO: 0.3 %
ERYTHROCYTE [DISTWIDTH] IN BLOOD BY AUTOMATED COUNT: 11.7 % (ref 10–15)
GFR SERPL CREATININE-BSD FRML MDRD: >90 ML/MIN/{1.73_M2}
GLUCOSE SERPL-MCNC: 91 MG/DL (ref 70–99)
GLUCOSE UR STRIP-MCNC: NEGATIVE MG/DL
HCT VFR BLD AUTO: 41 % (ref 35–47)
HGB BLD-MCNC: 14.5 G/DL (ref 11.7–15.7)
HGB UR QL STRIP: NEGATIVE
KETONES UR STRIP-MCNC: 15 MG/DL
LEUKOCYTE ESTERASE UR QL STRIP: ABNORMAL
LYMPHOCYTES # BLD AUTO: 1.5 10E9/L (ref 0.8–5.3)
LYMPHOCYTES NFR BLD AUTO: 20.4 %
MCH RBC QN AUTO: 29.9 PG (ref 26.5–33)
MCHC RBC AUTO-ENTMCNC: 35.4 G/DL (ref 31.5–36.5)
MCV RBC AUTO: 85 FL (ref 78–100)
MONOCYTES # BLD AUTO: 0.5 10E9/L (ref 0–1.3)
MONOCYTES NFR BLD AUTO: 6.1 %
NEUTROPHILS # BLD AUTO: 5.4 10E9/L (ref 1.6–8.3)
NEUTROPHILS NFR BLD AUTO: 72.9 %
NITRATE UR QL: NEGATIVE
NON-SQ EPI CELLS #/AREA URNS LPF: ABNORMAL /LPF
PH UR STRIP: 6.5 PH (ref 5–7)
PLATELET # BLD AUTO: 267 10E9/L (ref 150–450)
POTASSIUM SERPL-SCNC: 4 MMOL/L (ref 3.4–5.3)
RBC # BLD AUTO: 4.85 10E12/L (ref 3.8–5.2)
RBC #/AREA URNS AUTO: ABNORMAL /HPF
SODIUM SERPL-SCNC: 135 MMOL/L (ref 133–144)
SOURCE: ABNORMAL
SP GR UR STRIP: 1.02 (ref 1–1.03)
UROBILINOGEN UR STRIP-ACNC: 2 EU/DL (ref 0.2–1)
WBC # BLD AUTO: 7.4 10E9/L (ref 4–11)
WBC #/AREA URNS AUTO: ABNORMAL /HPF

## 2021-02-01 PROCEDURE — 81001 URINALYSIS AUTO W/SCOPE: CPT | Performed by: NURSE PRACTITIONER

## 2021-02-01 PROCEDURE — 85025 COMPLETE CBC W/AUTO DIFF WBC: CPT | Performed by: NURSE PRACTITIONER

## 2021-02-01 PROCEDURE — 36415 COLL VENOUS BLD VENIPUNCTURE: CPT | Performed by: NURSE PRACTITIONER

## 2021-02-01 PROCEDURE — 99207 REFERRAL TO ACUTE AND DIAGNOSTIC SERVICES: CPT | Performed by: NURSE PRACTITIONER

## 2021-02-01 PROCEDURE — 99215 OFFICE O/P EST HI 40 MIN: CPT | Mod: 25 | Performed by: PHYSICIAN ASSISTANT

## 2021-02-01 PROCEDURE — 87086 URINE CULTURE/COLONY COUNT: CPT | Performed by: NURSE PRACTITIONER

## 2021-02-01 PROCEDURE — 80048 BASIC METABOLIC PNL TOTAL CA: CPT | Performed by: NURSE PRACTITIONER

## 2021-02-01 PROCEDURE — 96374 THER/PROPH/DIAG INJ IV PUSH: CPT | Performed by: PHYSICIAN ASSISTANT

## 2021-02-01 RX ORDER — ONDANSETRON 4 MG/1
4 TABLET, FILM COATED ORAL
COMMUNITY
Start: 2021-01-23 | End: 2021-07-12

## 2021-02-01 RX ORDER — THIAMINE HYDROCHLORIDE 100 MG/ML
100 INJECTION, SOLUTION INTRAMUSCULAR; INTRAVENOUS ONCE
Qty: 1 ML | Refills: 0 | Status: CANCELLED
Start: 2021-02-01 | End: 2021-02-01

## 2021-02-01 RX ORDER — ONDANSETRON 2 MG/ML
4 INJECTION INTRAMUSCULAR; INTRAVENOUS ONCE
Status: COMPLETED | OUTPATIENT
Start: 2021-02-01 | End: 2021-02-01

## 2021-02-01 RX ORDER — LANOLIN ALCOHOL/MO/W.PET/CERES
100 CREAM (GRAM) TOPICAL 2 TIMES DAILY
Qty: 10 TABLET | Refills: 0
Start: 2021-02-01 | End: 2021-02-06

## 2021-02-01 RX ADMIN — ONDANSETRON 4 MG: 2 INJECTION INTRAMUSCULAR; INTRAVENOUS at 14:15

## 2021-02-01 SDOH — HEALTH STABILITY: MENTAL HEALTH: HOW MANY STANDARD DRINKS CONTAINING ALCOHOL DO YOU HAVE ON A TYPICAL DAY?: NOT ASKED

## 2021-02-01 SDOH — HEALTH STABILITY: MENTAL HEALTH: HOW OFTEN DO YOU HAVE A DRINK CONTAINING ALCOHOL?: NEVER

## 2021-02-01 SDOH — HEALTH STABILITY: MENTAL HEALTH: HOW OFTEN DO YOU HAVE 6 OR MORE DRINKS ON ONE OCCASION?: NEVER

## 2021-02-01 SDOH — HEALTH STABILITY: MENTAL HEALTH: HOW MANY DRINKS CONTAINING ALCOHOL DO YOU HAVE ON A TYPICAL DAY WHEN YOU ARE DRINKING?: NOT ASKED

## 2021-02-01 SDOH — HEALTH STABILITY: MENTAL HEALTH: HOW OFTEN DO YOU HAVE SIX OR MORE DRINKS ON ONE OCCASION?: NEVER

## 2021-02-01 NOTE — PROGRESS NOTES
Assessment & Plan     Hyperemesis gravidarum  Intractable vomiting with nausea, unspecified vomiting type  Pregnancy, unspecified gestational age  Labs reassuring.  UC pending.  Hydrated with LR x 1L and given 4 mg of Zofran IV, this was helpful in decreasing her nausea.  Recommend continuing oral Zofran and small/frequent hydration and intake efforts.  Also recommend PO thiamine crushed BID x 5 days in applesauce or similar as we were not able to get IM thiamine at the [facility] today.  Pt will follow closely with OBGYN.  - IV access  - sodium chloride (PF) 0.9% PF flush 3 mL  - Referral to Acute and Diagnostic Services (Day of diagnostic / First order acute)  - lactated ringers BOLUS 1,000 mL  - ondansetron (ZOFRAN) injection 4 mg  - vitamin B1 (B-1) 100 MG tablet  Dispense: 10 tablet; Refill: 0      Review of prior external note(s) from - [facility]where information from [facility] reviewed    40 minutes spent on the date of the encounter doing chart review, history and exam, documentation and further activities as noted above        Return in about 1 week (around 2/8/2021) for OBGYN.    SOBEIDA Rendon [facility] CHELSIE Kulkarni is a 30 year old who presents to clinic today for the following health issues  accompanied by her spouse:    HPI       Concern - hyperemesis  Onset: 9 weeks gestation, started vomiting at 6 weeks, worsened over last week, vomiting about 7 times a day, a lot is dry heaves, using zofran and has not been helping recently - last used yesterday.  Has not been able to work x 1 week.  Previous pregnancies had issues and was given Compazine for first baby with moderate relief.  Did not have IVF with previous pregnancies.  Weakness and fatigue, denies mucosal dryness.  Alleviating factors:        Improved by: has not found anything that helps it.  Some days eating can help.  Therapies tried and outcome: None        Review of Systems   Constitutional, HEENT,  cardiovascular, pulmonary, GI, , musculoskeletal, neuro, skin, endocrine and psych systems are negative, except as otherwise noted.      Objective    /74 (BP Location: Right arm, Patient Position: Chair, Cuff Size: Adult Regular)   Pulse 70   Temp 97.7  F (36.5  C) (Oral)   SpO2 99%   There is no height or weight on file to calculate BMI.  Physical Exam   GENERAL: healthy, alert and no distress  EYES: Eyes grossly normal to inspection  RESP: lungs clear to auscultation - no rales, rhonchi or wheezes  CV: regular rate and rhythm, normal S1 S2, no S3 or S4, no murmur, click or rub, no peripheral edema and peripheral pulses strong  ABDOMEN: soft, nontender, no hepatosplenomegaly, no masses and bowel sounds normal  MS: no gross musculoskeletal defects noted, no edema  SKIN: no suspicious lesions or rashes  NEURO: Normal strength and tone, mentation intact and speech normal  PSYCH: mentation appears normal, affect normal/bright    Results for orders placed or performed in visit on 02/01/21 (from the past 24 hour(s))   UA with Microscopic reflex to Culture    Specimen: Midstream Urine   Result Value Ref Range    Color Urine Yellow     Appearance Urine Slightly Cloudy     Glucose Urine Negative NEG^Negative mg/dL    Bilirubin Urine Negative NEG^Negative    Ketones Urine 15 (A) NEG^Negative mg/dL    Specific Gravity Urine 1.020 1.003 - 1.035    pH Urine 6.5 5.0 - 7.0 pH    Protein Albumin Urine Negative NEG^Negative mg/dL    Urobilinogen Urine 2.0 (H) 0.2 - 1.0 EU/dL    Nitrite Urine Negative NEG^Negative    Blood Urine Negative NEG^Negative    Leukocyte Esterase Urine Small (A) NEG^Negative    Source Midstream Urine     WBC Urine 5-10 (A) OTO5^0 - 5 /HPF    RBC Urine O - 2 OTO2^O - 2 /HPF    Squamous Epithelial /LPF Urine Few FEW^Few /LPF    Bacteria Urine Many (A) NEG^Negative /HPF   Basic metabolic panel  (Ca, Cl, CO2, Creat, Gluc, K, Na, BUN)   Result Value Ref Range    Sodium 135 133 - 144 mmol/L     Potassium 4.0 3.4 - 5.3 mmol/L    Chloride 102 94 - 109 mmol/L    Carbon Dioxide 28 20 - 32 mmol/L    Anion Gap 5 (L) 6 - 17 mmol/L    Glucose 91 70 - 99 mg/dL    Urea Nitrogen 9 7 - 30 mg/dL    Creatinine 0.60 0.52 - 1.04 mg/dL    GFR Estimate >90 >60 mL/min/[1.73_m2]    GFR Estimate If Black >90 >60 mL/min/[1.73_m2]    Calcium 9.4 8.5 - 10.1 mg/dL   CBC with platelets and differential   Result Value Ref Range    WBC 7.4 4.0 - 11.0 10e9/L    RBC Count 4.85 3.8 - 5.2 10e12/L    Hemoglobin 14.5 11.7 - 15.7 g/dL    Hematocrit 41.0 35.0 - 47.0 %    MCV 85 78 - 100 fl    MCH 29.9 26.5 - 33.0 pg    MCHC 35.4 31.5 - 36.5 g/dL    RDW 11.7 10.0 - 15.0 %    Platelet Count 267 150 - 450 10e9/L    % Neutrophils 72.9 %    % Lymphocytes 20.4 %    % Monocytes 6.1 %    % Eosinophils 0.3 %    % Basophils 0.3 %    Absolute Neutrophil 5.4 1.6 - 8.3 10e9/L    Absolute Lymphocytes 1.5 0.8 - 5.3 10e9/L    Absolute Monocytes 0.5 0.0 - 1.3 10e9/L    Absolute Eosinophils 0.0 0.0 - 0.7 10e9/L    Absolute Basophils 0.0 0.0 - 0.2 10e9/L    Diff Method Automated Method

## 2021-02-01 NOTE — PROGRESS NOTES
"Chief Complaint   Patient presents with     Dehydration     sx for a week with dehydration, nausea, vomit, \"low urine output\", and fatigue-is 1st trimester      Nausea     Vomiting     Fatigue     Urinary Problem         ICD-10-CM    1. Intractable vomiting with nausea, unspecified vomiting type  R11.2 UA with Microscopic reflex to Culture     Basic metabolic panel  (Ca, Cl, CO2, Creat, Gluc, K, Na, BUN)     CBC with platelets and differential     Urine Culture Aerobic Bacterial     Referral to Acute and Diagnostic Services (Day of diagnostic / First order acute)   2. Hyperemesis gravidarum  O21.0 Referral to Acute and Diagnostic Services (Day of diagnostic / First order acute)     Patient appears mildly dehydrated with dry mucous membranes and ketones present in the urine.  Spoke with [facility] specialty [place] provider and they will accept her to receive IV fluids and assess need for further antiemetics and/or imaging.  She has used Reglan in past pregnancies one with success and the other where it did not help.  Patient's  will drive her to [place].  She will follow-up with OB provider next week.  Urine is not completely declarative for urinary tract infection in this asymptomatic female so culture will be completed.    Medical Decision Making  Differential diagnosis includes but is not limited to viral gastroenteritis, diabetes, appendicitis, infectious process, food allergy, GERD, bowel obstruction, constipation, intussusception, malrotation, urinary tract infection, dehydration    Results for orders placed or performed in visit on 02/01/21 (from the past 24 hour(s))   UA with Microscopic reflex to Culture    Specimen: Midstream Urine   Result Value Ref Range    Color Urine Yellow     Appearance Urine Slightly Cloudy     Glucose Urine Negative NEG^Negative mg/dL    Bilirubin Urine Negative NEG^Negative    Ketones Urine 15 (A) NEG^Negative mg/dL    Specific Gravity Urine 1.020 1.003 - 1.035    pH " Urine 6.5 5.0 - 7.0 pH    Protein Albumin Urine Negative NEG^Negative mg/dL    Urobilinogen Urine 2.0 (H) 0.2 - 1.0 EU/dL    Nitrite Urine Negative NEG^Negative    Blood Urine Negative NEG^Negative    Leukocyte Esterase Urine Small (A) NEG^Negative    Source Midstream Urine     WBC Urine 5-10 (A) OTO5^0 - 5 /HPF    RBC Urine O - 2 OTO2^O - 2 /HPF    Squamous Epithelial /LPF Urine Few FEW^Few /LPF    Bacteria Urine Many (A) NEG^Negative /HPF   CBC with platelets and differential   Result Value Ref Range    WBC 7.4 4.0 - 11.0 10e9/L    RBC Count 4.85 3.8 - 5.2 10e12/L    Hemoglobin 14.5 11.7 - 15.7 g/dL    Hematocrit 41.0 35.0 - 47.0 %    MCV 85 78 - 100 fl    MCH 29.9 26.5 - 33.0 pg    MCHC 35.4 31.5 - 36.5 g/dL    RDW 11.7 10.0 - 15.0 %    Platelet Count 267 150 - 450 10e9/L    % Neutrophils 72.9 %    % Lymphocytes 20.4 %    % Monocytes 6.1 %    % Eosinophils 0.3 %    % Basophils 0.3 %    Absolute Neutrophil 5.4 1.6 - 8.3 10e9/L    Absolute Lymphocytes 1.5 0.8 - 5.3 10e9/L    Absolute Monocytes 0.5 0.0 - 1.3 10e9/L    Absolute Eosinophils 0.0 0.0 - 0.7 10e9/L    Absolute Basophils 0.0 0.0 - 0.2 10e9/L    Diff Method Automated Method        Subjective     Cayla Lerner is an 30 year oldfemale who presents to clinic today for intractable nausea and vomiting that started approximately a week ago.  She has been taking Zofran 4 to 8 mg and initially had some relief but the last several days she has not been able to keep anything down.  She is feeling very fatigued and is 9 weeks pregnant.  This is her third pregnancy and her first appointment with the midwife is next week.  She has not had an ultrasound completed with this pregnancy.  She does have a history of hyperemesis with previous pregnancies.  Last bowel movement was 2 days ago but she has not eaten anything in the last 3 days so does not feel this is surprising.  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.     accompanied by her spouse:     ROS: 10 point  ROS neg other than the symptoms noted above in the HPI.       Objective    /71 (BP Location: Left arm, Patient Position: Sitting, Cuff Size: Adult Regular)   Pulse 84   Temp 99.1  F (37.3  C) (Tympanic)   Wt 54.7 kg (120 lb 9.6 oz)   LMP  (Exact Date)   SpO2 98%     Physical Exam       GENERAL APPEARANCE: alert and fatigued     EYES: PERRL, EOMI, sclera non-icteric     HENT: Mucous membranes are tacky and tongue appears moderately dry     NECK: no adenopathy or asymmetry, thyroid normal to palpation     RESP: lungs clear to auscultation - no rales, rhonchi or wheezes     CV: regular rates and rhythm, no murmurs, rubs, or gallop     ABDOMEN:  soft, nontender, no HSM or masses and bowel sounds normal     MS: extremities normal- no gross deformities noted; normal muscle tone.     SKIN: no suspicious lesions or rashes    Patient Instructions   Go to [facility] in [place] for IV Fluids.          NELDA Benz, CNP  [facility] Provider

## 2021-02-02 LAB
BACTERIA SPEC CULT: NORMAL
Lab: NORMAL
SPECIMEN SOURCE: NORMAL

## 2021-02-11 ENCOUNTER — PRENATAL OFFICE VISIT - HEALTHEAST (OUTPATIENT)
Dept: MIDWIFE SERVICES | Facility: CLINIC | Age: 31
End: 2021-02-11

## 2021-02-11 DIAGNOSIS — O21.9 NAUSEA AND VOMITING OF PREGNANCY, ANTEPARTUM: ICD-10-CM

## 2021-02-11 DIAGNOSIS — Z34.81 ENCOUNTER FOR SUPERVISION OF OTHER NORMAL PREGNANCY IN FIRST TRIMESTER: ICD-10-CM

## 2021-02-11 LAB
BASOPHILS # BLD AUTO: 0 THOU/UL (ref 0–0.2)
BASOPHILS NFR BLD AUTO: 0 % (ref 0–2)
EOSINOPHIL # BLD AUTO: 0 THOU/UL (ref 0–0.4)
EOSINOPHIL NFR BLD AUTO: 1 % (ref 0–6)
ERYTHROCYTE [DISTWIDTH] IN BLOOD BY AUTOMATED COUNT: 12.4 % (ref 11–14.5)
HBV SURFACE AG SERPL QL IA: NEGATIVE
HCT VFR BLD AUTO: 35.7 % (ref 35–47)
HGB BLD-MCNC: 12.6 G/DL (ref 12–16)
HIV 1+2 AB+HIV1 P24 AG SERPL QL IA: NEGATIVE
IMM GRANULOCYTES # BLD: 0 THOU/UL
IMM GRANULOCYTES NFR BLD: 1 %
LYMPHOCYTES # BLD AUTO: 1.4 THOU/UL (ref 0.8–4.4)
LYMPHOCYTES NFR BLD AUTO: 21 % (ref 20–40)
MCH RBC QN AUTO: 30.4 PG (ref 27–34)
MCHC RBC AUTO-ENTMCNC: 35.3 G/DL (ref 32–36)
MCV RBC AUTO: 86 FL (ref 80–100)
MONOCYTES # BLD AUTO: 0.5 THOU/UL (ref 0–0.9)
MONOCYTES NFR BLD AUTO: 8 % (ref 2–10)
NEUTROPHILS # BLD AUTO: 4.7 THOU/UL (ref 2–7.7)
NEUTROPHILS NFR BLD AUTO: 70 % (ref 50–70)
PLATELET # BLD AUTO: 231 THOU/UL (ref 140–440)
PMV BLD AUTO: 9.9 FL (ref 8.5–12.5)
RBC # BLD AUTO: 4.15 MILL/UL (ref 3.8–5.4)
WBC: 6.6 THOU/UL (ref 4–11)

## 2021-02-11 ASSESSMENT — MIFFLIN-ST. JEOR: SCORE: 1294.67

## 2021-02-11 ASSESSMENT — EDINBURGH POSTNATAL DEPRESSION SCALE (EPDS): TOTAL SCORE: 3

## 2021-02-12 LAB
ABO/RH(D): NORMAL
ABORH REPEAT: NORMAL
ALBUMIN SERPL-MCNC: 3.7 G/DL (ref 3.5–5)
ALP SERPL-CCNC: 22 U/L (ref 45–120)
ALT SERPL W P-5'-P-CCNC: 15 U/L (ref 0–45)
ANION GAP SERPL CALCULATED.3IONS-SCNC: 7 MMOL/L (ref 5–18)
ANTIBODY SCREEN: NEGATIVE
AST SERPL W P-5'-P-CCNC: 11 U/L (ref 0–40)
BACTERIA SPEC CULT: NO GROWTH
BILIRUB SERPL-MCNC: 0.3 MG/DL (ref 0–1)
BUN SERPL-MCNC: 10 MG/DL (ref 8–22)
CALCIUM SERPL-MCNC: 8.7 MG/DL (ref 8.5–10.5)
CHLORIDE BLD-SCNC: 104 MMOL/L (ref 98–107)
CO2 SERPL-SCNC: 25 MMOL/L (ref 22–31)
CREAT SERPL-MCNC: 0.58 MG/DL (ref 0.6–1.1)
GFR SERPL CREATININE-BSD FRML MDRD: >60 ML/MIN/1.73M2
GLUCOSE BLD-MCNC: 89 MG/DL (ref 70–125)
POTASSIUM BLD-SCNC: 3.8 MMOL/L (ref 3.5–5)
PROT SERPL-MCNC: 5.8 G/DL (ref 6–8)
RUBV IGG SERPL QL IA: POSITIVE
SODIUM SERPL-SCNC: 136 MMOL/L (ref 136–145)
T PALLIDUM AB SER QL: NEGATIVE

## 2021-02-13 ENCOUNTER — COMMUNICATION - HEALTHEAST (OUTPATIENT)
Dept: OBGYN | Facility: CLINIC | Age: 31
End: 2021-02-13

## 2021-02-13 DIAGNOSIS — O21.9 NAUSEA AND VOMITING IN PREGNANCY: ICD-10-CM

## 2021-03-08 ENCOUNTER — COMMUNICATION - HEALTHEAST (OUTPATIENT)
Dept: MIDWIFE SERVICES | Facility: CLINIC | Age: 31
End: 2021-03-08

## 2021-03-08 ENCOUNTER — PRENATAL OFFICE VISIT - HEALTHEAST (OUTPATIENT)
Dept: MIDWIFE SERVICES | Facility: CLINIC | Age: 31
End: 2021-03-08

## 2021-03-08 DIAGNOSIS — Z34.81 ENCOUNTER FOR SUPERVISION OF OTHER NORMAL PREGNANCY IN FIRST TRIMESTER: ICD-10-CM

## 2021-03-08 DIAGNOSIS — Z34.80 SUPERVISION OF OTHER NORMAL PREGNANCY, ANTEPARTUM: ICD-10-CM

## 2021-03-08 ASSESSMENT — MIFFLIN-ST. JEOR: SCORE: 1294.67

## 2021-03-10 ENCOUNTER — IMMUNIZATION (OUTPATIENT)
Dept: NURSING | Facility: CLINIC | Age: 31
End: 2021-03-10
Payer: COMMERCIAL

## 2021-03-10 PROCEDURE — 0031A PR COVID VAC JANSSEN AD26 0.5ML: CPT

## 2021-03-10 PROCEDURE — 91303 PR COVID VAC JANSSEN AD26 0.5ML: CPT

## 2021-04-05 ENCOUNTER — PRENATAL OFFICE VISIT - HEALTHEAST (OUTPATIENT)
Dept: MIDWIFE SERVICES | Facility: CLINIC | Age: 31
End: 2021-04-05

## 2021-04-05 DIAGNOSIS — O09.299 HISTORY OF PRE-ECLAMPSIA IN PRIOR PREGNANCY, CURRENTLY PREGNANT: ICD-10-CM

## 2021-04-05 DIAGNOSIS — Z21 POSITIVE LABORATORY TESTING FOR HUMAN IMMUNODEFICIENCY VIRUS (H): ICD-10-CM

## 2021-04-05 DIAGNOSIS — O09.92 HIGH-RISK PREGNANCY, SECOND TRIMESTER: ICD-10-CM

## 2021-04-05 DIAGNOSIS — R74.8 ABNORMAL LIVER ENZYMES: ICD-10-CM

## 2021-04-06 ENCOUNTER — COMMUNICATION - HEALTHEAST (OUTPATIENT)
Dept: MIDWIFE SERVICES | Facility: CLINIC | Age: 31
End: 2021-04-06

## 2021-04-15 ENCOUNTER — AMBULATORY - HEALTHEAST (OUTPATIENT)
Dept: OBGYN | Facility: CLINIC | Age: 31
End: 2021-04-15

## 2021-04-15 ENCOUNTER — HOSPITAL ENCOUNTER (OUTPATIENT)
Dept: ULTRASOUND IMAGING | Facility: HOSPITAL | Age: 31
Discharge: HOME | End: 2021-04-15
Attending: ADVANCED PRACTICE MIDWIFE

## 2021-04-15 DIAGNOSIS — O35.BXX0 ECHOGENIC FOCUS OF HEART OF FETUS AFFECTING ANTEPARTUM CARE OF MOTHER, SINGLE OR UNSPECIFIED FETUS: ICD-10-CM

## 2021-04-15 DIAGNOSIS — Z34.81 ENCOUNTER FOR SUPERVISION OF OTHER NORMAL PREGNANCY IN FIRST TRIMESTER: ICD-10-CM

## 2021-04-15 DIAGNOSIS — O28.3 ECHOGENIC BOWEL OF FETUS ON PRENATAL ULTRASOUND: ICD-10-CM

## 2021-04-15 DIAGNOSIS — O28.3 ABNORMAL FETAL ULTRASOUND: ICD-10-CM

## 2021-04-16 ENCOUNTER — RECORDS - HEALTHEAST (OUTPATIENT)
Dept: ADMINISTRATIVE | Facility: OTHER | Age: 31
End: 2021-04-16

## 2021-04-20 ENCOUNTER — COMMUNICATION - HEALTHEAST (OUTPATIENT)
Dept: MIDWIFE SERVICES | Facility: CLINIC | Age: 31
End: 2021-04-20

## 2021-04-21 ENCOUNTER — COMMUNICATION - HEALTHEAST (OUTPATIENT)
Dept: MIDWIFE SERVICES | Facility: CLINIC | Age: 31
End: 2021-04-21

## 2021-04-24 ENCOUNTER — HEALTH MAINTENANCE LETTER (OUTPATIENT)
Age: 31
End: 2021-04-24

## 2021-05-12 ENCOUNTER — PRENATAL OFFICE VISIT - HEALTHEAST (OUTPATIENT)
Dept: MIDWIFE SERVICES | Facility: CLINIC | Age: 31
End: 2021-05-12

## 2021-05-12 DIAGNOSIS — O09.299 HISTORY OF PRE-ECLAMPSIA IN PRIOR PREGNANCY, CURRENTLY PREGNANT: ICD-10-CM

## 2021-05-12 DIAGNOSIS — Z34.80 SUPERVISION OF OTHER NORMAL PREGNANCY, ANTEPARTUM: ICD-10-CM

## 2021-05-12 ASSESSMENT — MIFFLIN-ST. JEOR: SCORE: 1367.25

## 2021-05-27 VITALS
SYSTOLIC BLOOD PRESSURE: 118 MMHG | WEIGHT: 139 LBS | DIASTOLIC BLOOD PRESSURE: 62 MMHG | HEIGHT: 66 IN | HEART RATE: 86 BPM | OXYGEN SATURATION: 100 % | BODY MASS INDEX: 22.34 KG/M2

## 2021-05-27 NOTE — PROGRESS NOTES
Patient ID: Cayla Lerner is a 28 y.o. female.  Assessment:   Normal postpartum exam at ~6 weeks postpartum.   lactating   Desires IUD for contraception  Hx of preeclampsia diagnosed on admission to hospital and normotensive since delivery  Hx of elevated liver enzymes with preeclampsia    Plan:    1. Pap smear done at today's visit. Due for annual Gyn exam in April 2020   2. Desired contraception: IUD.   3. Discussed resumption of exercise and setting a goal to return to pre-pregnancy weight in the next 6-12 months.   4.  Resumption of intercourse reviewed with possible changes in libido and vaginal lubrication while nursing.  4.  Nutrition and supplements reviewed.  Advised continuation of a prenatal or multivitamin, also Vitamin D3 5000 IU geltab daily and an omega 3 fatty acid supplement.  5. Adjustment to parenting, self care and open communication with her support system discussed. Warning signs and symptoms related to postpartum mood disorders reviewed.   6. Normotensive today. However, due to her history of elevated liver enzymes at time of delivery and immediately postpartum, I felt it would be good to evaluate again to see if these had normalized now 7 weeks postpartum. Unfortunately, lab was not available today when patient was in the clinic, but CMA called patient to let her know that there are standing future orders in to evaluate status and she was encouraged to come in to have them drawn in the next few days to a week.  7. IUD inserted today; see IUD insertion note below. Patient instructed to come in for string check in 4-6 weeks.    Total time spent with patient 40 minutes, >50% counseling, education and coordination of care. An additional 15 mins for insertion of IUD    NELDA Granado CNM   4/3/2019 4:28 PM      Subjective:     Cayla Lerner is a 28 y.o. female who presents for postpartum visit. She is 6 weeks postpartum following a NSVB.  I have fully reviewed the prenatal and  intrapartum course. The delivery was at Term and 39w6d weeks gestation Her baby is named Florina and weighed 6 lbs 9.8 oz at birth. Breast fed her first daughter almost a year.    Postpartum course has been complicated by elevated BPs on admission in labor, but normal and stable BPs postpartum and no signs of preeclampsia. She did have some mildly elevated liver enzymes on admission and then following the two days postpartum, with ALT at 78, which was decreased from 80 on 2/18/19 and decreased from 103 on 2/17/19. AST was on the upper limit of normal at time of delivery (2/17/19) at 42, then was stable the next day at 43 and then WNL at 40 on 2/19/19.  She did come in for a BP check on 2/26/19 (9days postpartum), but no laboratory evaluation is available to review.      Baby's course has been stable. Baby is feeding breast.  Lochia ceased at 4 weeks postpartum.  Bowel function is normal. Bladder function is normal. She has not resumed intercourse. Desired contraception: IUD. [place] postpartum depression screening score: 0. She has not resumed regular exercise. Patient returns to work in 6 weeks. She desires an IUD insertion today if possible.     Pap smear is due today. No hx of abnormals.    Declines STI testing today.     Review of Systems  General:  Denies problem  Eyes: Denies problem  Ears/Nose/Throat: Denies problem  Cardiovascular: Denies problem  Respiratory:  Denies problem  Gastrointestinal:  Denies problem, Genitourinary: Denies problem  Musculoskeletal:  Denies problem  Skin: Denies problem  Neurologic: Denies problem  Psychiatric: Denies Problem  Endocrine: Denies problem    Objective:         Physical Exam:  General Appearance: Alert, cooperative, no distress, appears stated age  Skin: Skin color, texture, turgor normal, no rashes or lesions  Throat: Lips, mucosa, and tongue normal; teeth and gums normal  HEENT: grossly normal; otoscopic and opthalmic exam not performed.   Neck: Supple, symmetrical,  trachea midline, no adenopathy;  thyroid: not enlarged, symmetric, no tenderness/mass/nodules  Lungs: Clear to auscultation bilaterally, respirations unlabored  Breasts: No breast masses, tenderness, asymmetry, or nipple discharge.  Heart: Regular rate and rhythm, S1 and S2 normal, no murmur, rub, or gallop  Abdomen: Soft, non-tender.  Incision NA  Pelvic:External genitalia normal without lesions or irritation. Vagina and cervix show no lesions, inflammation, discharge or tenderness. No laceration. Skin well approximated and well healed.   No cystocele, No rectocele. Uterus fully involuted.  No adnexal mass or tenderness.  Extremities: Extremities normal without varicosities or edema       Last Pap: 2016. Results were: normal  Immunization History   Administered Date(s) Administered     Influenza,seasonal quad, PF, 36+MOS 10/30/2017, 11/12/2018     Tdap 04/17/2017, 12/17/2018     Immunization status: up to date and documented       IUD Insertion Procedure Note    Pre-operative Diagnosis: Healthy well woman    Post-operative Diagnosis: same    Indications: contraception    Procedure Details   Urine pregnancy test was not done; patient postpartum and has not resumed intercourse.  The risks (including infection, bleeding, pain, and uterine perforation) and benefits of the procedure were explained to the patient and Verbal and written informed consent was obtained.      Cervix cleansed with Betadine. Uterus sounded to 7 cm. IUD inserted without difficulty. String visible and trimmed. Patient tolerated procedure well.    IUD Information:  Mirena, Lot # [id number], Expiration date Jan 2021.    Condition:  Stable    Complications:  None    Plan:    The patient was advised to call for any fever or for prolonged or severe pain or bleeding. She was advised to use OTC ibuprofen as needed for mild to moderate pain.     She will follow up in 4-6 weeks for IUD check.

## 2021-05-28 NOTE — PROGRESS NOTES
Office Visit   Cayla Lerner   28 y.o. female    Date of Visit: 5/15/2019    Chief Complaint   Patient presents with     Follow-up     New pt- increased liver function tests        Assessment and Plan   1. Elevated ALT measurement  This is of unclear etiology but has been going on now for 3 months.  I think it needs further evaluation.  We will set her up for blood work today including hepatitis C, B, iron studies, ferritin, thyroid, and smooth muscle antibody to look for autoimmune.  We will also recheck her liver tests.  We will set her up for an ultrasound of the liver to evaluate this further.  I will get back to her with her test results and will determine next steps from there.  She is in agreement with this plan.  - F-Actin (Smooth Muscle) Antibody, IgG by BRENDA with Reflex to Smooth Muscle Antibody, IgG Titer  - Hepatitis C Antibody (Anti-HCV)  - Hepatitis B Surface Antigen (HBsAG)  - Iron and Transferrin Iron Binding Capacity  - Tissue Transglutaminase,IgA & IgG  - US Abdomen Limited; Future  - Hepatitis B Surface Antibody (Anti-HBs)  - Ferritin  - Comprehensive Metabolic Panel  - Thyroid Cascade       No follow-ups on file.     History of Present Illness   This 28 y.o. old female comes in to establish care and to discuss elevated liver enzymes.  She is a very healthy 28-year-old.  She had a baby in February.  During the delivery she did have some elevated blood pressures.  She was also noted to have a slight increase in her liver function tests.  These have been followed for the last 3 months.  Initially they were improving but with the last check a couple weeks ago they went up higher.  She does not drink alcohol and is not been taking Tylenol.  She has not had any abdominal pain.  She has not noticed any change in her skin, bowels, or abdominal pain.  She does not have any acid reflux.  She has no other acute concerns today and is otherwise up-to-date on age-appropriate health maintenance.    Review  "of Systems: As noted above.     Medications, Allergies and Problem List   Patient Active Problem List   Diagnosis     Lactating mother     IUD (intrauterine device) in place     Elevated ALT measurement     Current Outpatient Medications   Medication Sig Dispense Refill     multivitamin therapeutic tablet Take 1 tablet by mouth daily.       No current facility-administered medications for this visit.      No Known Allergies       Physical Exam     /70 (Patient Site: Right Arm, Patient Position: Sitting)   Pulse 82   Ht 5' 6\" (1.676 m)   Wt 130 lb (59 kg)   SpO2 100%   BMI 20.98 kg/m      General:  Patient is alert and in no apparent distress.  Neck:  Supple with no adenopathy or thyroid abnormality noted.  Cardiovascular:  Regular rate and rhythm, normal S1/S2, no murmurs, rubs, or gallop.  Pulmonary:  Lungs are clear to auscultation bilaterally with normal respiratory effort.  Gastrointestinal:  Abdomen is soft, non-tender, non-distended, with no organomegaly, rebound or guarding.         Additional Information   Social History     Tobacco Use     Smoking status: Never Smoker     Smokeless tobacco: Never Used   Substance Use Topics     Alcohol use: No     Comment: none while pregnant     Drug use: No            Luz Marina Mayen MD    "

## 2021-05-28 NOTE — PROGRESS NOTES
Kj Kulkarni.  Your US found the possibility of mild fat in your liver and a polyp in your gallbladder.  The polyp is not blocking or causing any abnormalities in your gallbladder.  No other abnormalities were noted.  Did you do or schedule the labs that I ordered yesterday?  Please let me know so that we can reassess your liver function and check for other causes of your elevated LFT's.  HEMAL

## 2021-05-28 NOTE — PROGRESS NOTES
"S: Pt presents for IUD check.  Mirena IUD was inserted approx 4 weeks ago.  Pt reports she has had some light ongoing spotting since the insertion.  Denies heavy bleeding/clots.  Feels the spotting is manageable.  Has had IC since the placement, denies concerns.  Denies cramping/pain.  Has tried to feel the strings but has not yet been successful at finding them.  Overall she reports she is happy with this method and wishes to continue  She also reports she needs to have her liver enzymes rechecked today.  She had preeclampsia during her pregnancy with elevated liver enzymes.  Her ALT remained elevated at the last check and it was recommended that she have it redrawn in 4 weeks.  It has continued to decrease at each check.  Pt is normotensive.  Denies s/s of preeclampsia including HA, visual disturbances, epigastric pain.  O: A&O.  NAD.  Pleasant, articulate female.  Speculum placed.  Physiologic discharge present.  Cervix posterior and to patient's left.  IUD strings visualized.  Appears WNL.  A: , four weeks s/p Mirena insertion, IUD apparently in good position  P: Reviewed strategies for checking strings.  Reviewed warning signs/WTC.  Will have liver enzymes drawn today due to preeclampsia/ongoing elevated liver enzymes.  RTC in 1 year for RHM or prn.  NELDA Mcgowan, SHAUNA  TT with patient 15\" with >50% spent on counseling/coordination of care.    "

## 2021-05-30 VITALS — HEIGHT: 67 IN | BODY MASS INDEX: 22.91 KG/M2 | WEIGHT: 146 LBS

## 2021-05-30 VITALS — HEIGHT: 67 IN | WEIGHT: 137 LBS | BODY MASS INDEX: 21.5 KG/M2

## 2021-05-30 VITALS — WEIGHT: 143 LBS | BODY MASS INDEX: 22.44 KG/M2 | HEIGHT: 67 IN

## 2021-05-30 VITALS — WEIGHT: 137 LBS | HEIGHT: 67 IN | BODY MASS INDEX: 21.5 KG/M2

## 2021-05-30 VITALS — BODY MASS INDEX: 18.28 KG/M2 | HEIGHT: 67 IN | WEIGHT: 116.5 LBS

## 2021-05-30 VITALS — WEIGHT: 133 LBS | BODY MASS INDEX: 20.88 KG/M2 | HEIGHT: 67 IN

## 2021-05-30 VITALS — HEIGHT: 67 IN | WEIGHT: 127 LBS | BODY MASS INDEX: 19.93 KG/M2

## 2021-05-31 VITALS — WEIGHT: 126 LBS | BODY MASS INDEX: 19.78 KG/M2 | HEIGHT: 67 IN

## 2021-05-31 VITALS — WEIGHT: 154 LBS | HEIGHT: 67 IN | BODY MASS INDEX: 24.17 KG/M2

## 2021-05-31 VITALS — HEIGHT: 67 IN | BODY MASS INDEX: 23.54 KG/M2 | WEIGHT: 150 LBS

## 2021-05-31 VITALS — HEIGHT: 67 IN | BODY MASS INDEX: 23.31 KG/M2 | WEIGHT: 148.5 LBS

## 2021-06-01 VITALS — BODY MASS INDEX: 17.68 KG/M2 | WEIGHT: 110 LBS | HEIGHT: 66 IN

## 2021-06-01 VITALS — WEIGHT: 115.5 LBS | HEIGHT: 66 IN | BODY MASS INDEX: 18.56 KG/M2

## 2021-06-02 VITALS — HEIGHT: 66 IN | BODY MASS INDEX: 20.89 KG/M2 | WEIGHT: 130 LBS

## 2021-06-02 VITALS — BODY MASS INDEX: 24.7 KG/M2 | HEIGHT: 66 IN | WEIGHT: 153.7 LBS

## 2021-06-02 VITALS — BODY MASS INDEX: 22.9 KG/M2 | HEIGHT: 66 IN | WEIGHT: 142.5 LBS

## 2021-06-02 VITALS — WEIGHT: 133.7 LBS | BODY MASS INDEX: 21.49 KG/M2 | HEIGHT: 66 IN

## 2021-06-02 VITALS — HEIGHT: 66 IN | WEIGHT: 141 LBS | BODY MASS INDEX: 22.66 KG/M2

## 2021-06-02 VITALS — HEIGHT: 66 IN | BODY MASS INDEX: 25.17 KG/M2 | WEIGHT: 156.6 LBS

## 2021-06-02 VITALS — BODY MASS INDEX: 20.73 KG/M2 | WEIGHT: 129 LBS | HEIGHT: 66 IN

## 2021-06-02 VITALS — HEIGHT: 66 IN | WEIGHT: 152 LBS | BODY MASS INDEX: 24.43 KG/M2

## 2021-06-02 VITALS — BODY MASS INDEX: 24.91 KG/M2 | HEIGHT: 66 IN | WEIGHT: 155 LBS

## 2021-06-02 VITALS — HEIGHT: 66 IN | WEIGHT: 133 LBS | BODY MASS INDEX: 21.38 KG/M2

## 2021-06-02 VITALS — BODY MASS INDEX: 24.35 KG/M2 | HEIGHT: 66 IN | WEIGHT: 151.5 LBS

## 2021-06-02 VITALS — BODY MASS INDEX: 20.7 KG/M2 | HEIGHT: 66 IN | WEIGHT: 128.8 LBS

## 2021-06-04 VITALS
HEIGHT: 66 IN | WEIGHT: 127 LBS | BODY MASS INDEX: 20.41 KG/M2 | SYSTOLIC BLOOD PRESSURE: 108 MMHG | DIASTOLIC BLOOD PRESSURE: 62 MMHG | HEART RATE: 68 BPM

## 2021-06-05 VITALS
WEIGHT: 129 LBS | SYSTOLIC BLOOD PRESSURE: 104 MMHG | HEART RATE: 71 BPM | DIASTOLIC BLOOD PRESSURE: 58 MMHG | OXYGEN SATURATION: 100 % | BODY MASS INDEX: 20.82 KG/M2

## 2021-06-05 VITALS
DIASTOLIC BLOOD PRESSURE: 62 MMHG | BODY MASS INDEX: 19.77 KG/M2 | SYSTOLIC BLOOD PRESSURE: 116 MMHG | WEIGHT: 123 LBS | HEART RATE: 76 BPM | HEIGHT: 66 IN

## 2021-06-05 VITALS
SYSTOLIC BLOOD PRESSURE: 102 MMHG | HEART RATE: 84 BPM | WEIGHT: 123 LBS | HEIGHT: 66 IN | DIASTOLIC BLOOD PRESSURE: 54 MMHG | BODY MASS INDEX: 19.77 KG/M2

## 2021-06-08 NOTE — PROGRESS NOTES
Cayla and Davon here for routine 17 wk PNV, reviewed labs, US ref given, declines quad screen. Answered questions.  Left leg tenderness improved.

## 2021-06-09 NOTE — PROGRESS NOTES
Here for HAILEY with  Davon. Doing well though noting some continued cough and cold symptoms. States that the Z-kody helped slightly but didn't completely resolve but nothing worsening. Denies any SOB, fevers, chills. Questions about traveling in pregnancy by car; reviewed moving and getting out of the car. Discussed how there is a risk of delivering out of network/away from home if she goes into labor while she is traveling. Would like to go and visit her sister in [place] this summer when she'll be around 36 weeks. CBE: Planning on doing some sort of CBE but uncertain where at. Reviewed some options here in the [facility]. Plan is to RTC 4 weeks. Will complete 1 hour GCT, hemoglobin and repeat RPR today.

## 2021-06-09 NOTE — PROGRESS NOTES
"Here for return OB visit with  Davon.  Feeling well, however has had cough and URI since onset of pregnancy.  Denies fever, states cough is productive, \"wet\" with mucous when she coughs.  She denies sinus pain or congestion, denies sore throat.  Denies allergies.  Lungs are clear bilaterally, no wheeze, rhonci or rales noted.  Denies SOB or dyspnea.  Exam of throat reveals pharyngeal erythema, tonsilar hypertrophy, anterior cervical lymph nodes are tender with palpation and enlarged.  Rx for z-pack faxed to pharmacy.  Pt to call if no improvement in symptoms.  Feeling normal fetal movement. Reviewed US with patient.  Reports no contractions.  Wearing compression stockings for work, denies any pain in leg, no swelling or edema noted. Discussed 1 hour GCT for screening at next visit.  "

## 2021-06-10 NOTE — PROGRESS NOTES
Cayla presents to the clinic today with Davon.  Total weight gain within normal limits, however, there seems to be a slowing trend in fundal height rise.  This writer appreciates 2 things: Enter examiner measuring differences and low station of the fetal head.  Nonetheless, ultrasound is recommended for growth, accepted by patient and ordered for today.  GBS RV culture and hemoglobin obtained today.  No complaint of cough.   labor precautions and danger signs and symptoms reviewed.  All questions answered.  Encouraged daily fetal movement counting and to call or return to clinic with any questions, concerns, or as needed.  Patient is unable to return to clinic next week, and therefore will return in 2 weeks or sooner as needed.

## 2021-06-10 NOTE — PROGRESS NOTES
*Next visit ask about repeat AB testing for HIV*  Reflex worsening, advised to try papaya enzyme, also discussed mechanical issues to try and improve the cough. Continues to notice a daily cough in the morning, suspect might be related to reflux.  Advised seeing FPMD for further eval of cough if needed.  Denies s/sx PTL, reports normal FM.  Reviewed plan of care for remainder of pregnancy.

## 2021-06-10 NOTE — PROGRESS NOTES
Cayla presents with Davon today. Feeling well. Having some difficulty sleeping, plans to try warm baths and Unisom at night. Looking into online [employer] classes. Considering WW. Will tour in the next few weeks and pre-register. Declines waterbirth. Planning to just have Davon at birth, declines . Continues to have a cough, declines referral to primary care for further evaluation. Denies any known allergies. Reports good fetal movement, denies s/sx of PTL. EDPS = 2

## 2021-06-10 NOTE — PROGRESS NOTES
Here with  Davon.  Declines repeat AB testing for HIV. Denies any questions, still has cough. Normal FM. Feeling no UC's. Will plan on CBE online. Wants breast pump rx.  Has car seat Graco with bases.  Undecided on pediatrician. Not taking Paypaya, taking glass of milk at night time for heartburn.

## 2021-06-11 NOTE — PROGRESS NOTES
Cayla is here with Davon for routine prenatal visit at 40 weeks today.  She is feeling well and denies any warning signs or symptoms notes good fetal movement.  She had some regular contractions yesterday for about an hour and a half but they went away.  She denies any loss of fluid or vaginal bleeding. The patient had a size dates uterine discrepancy, measuring 35 cm at 40 weeks today.  The patient has a history of size less than dates in her  care and had a growth ultrasound that measured consistent with her dates about 5 weeks ago at 35 weeks.  However the patient has had 2 cm a fundal height positive interval of growth in the last 5 weeks and I feel it is indicated to get a second repeat follow-up ultrasound for growth at this time given the 5 cm size date discrepancy today.  The patient will schedule this appointment for today at [facility] as she is able. Reviewed reasons to call the CNM, signs of labor.  Patient will return to clinic in a week.  I reviewed and discussed postdates testing with the patient and placed in order for a BPP for a week from today.

## 2021-06-11 NOTE — PROGRESS NOTES
IUD REMOVAL    Subjective:       Cayla Lerner is a 30 y.o. female who presents for IUD removal. She had a Mirena inserted on 4/3/2019. Reports no complaints since insertion.  Desiring pregnancy.  Did experience significant nausea and vomiting in previous pregnancies.  Is not taking prenatal vitamin.    Review of Systems  Pertinent items are noted in HPI.      Objective:     Physical Exam:  General: Pleasant, articulate, well-groomed, well-nourished female.  Not in any apparent distress.  Abdomen: Soft, nontender, no masses palpated, negative CVAT.  External genitalia: Normal hair distribution, no lesions.  Urethral opening: Without lesions or discharge. No tenderness.   Bladder: Without masses, or tenderness.  Vagina: Pink, rugated, normal-appearing discharge.  Cervix: parous, pink, smooth, no lesions, ectropion noted. IUD strings visualized and 3 cm in length.  IUD easily removed with ring forceps, with speculum in place.  Bimanual: Uterus small, mobile, nontender, no masses.  Negative CMT.  Adnexa, without masses or tenderness.      Assessment:     Intrauterine device removed without incident.     Plan:   -Discussed starting daily prenatal vitamins now, may consider PNV without iron.  Advised Vitamin B6 three times a day starting with first positive home UPT.  Advised starting 1/2 Unisom daily starting with first day of any nausea.  Discussed small frequent fluids and nutrition to help ease nausea.  -RTC as needed for prenatal care if desired pregnancy achieved, otherwise for next annual exam or sooner as needed.    TT with patient 20 mns >50% time spent in counseling or coordination of care.      NELDA Mccullough, CNM,CLC  9/4/2020  7:22 AM

## 2021-06-11 NOTE — PROGRESS NOTES
Cayla BOYER Mirashailesh is here with Davon for a routine prenatal visit at 38w6d.  She has no concerns and is feeling well.  Denies any regular contractions, LOF or vaginal bleeding.    Fetal movement is normal. Interval weight gain is excessive. Group B strep was negative.  Encouraged to schedule weekly visits from to/through her EDB.   Anticipatory guidance, signs of symptoms of labor or SROM, third trimester warning signs, when to call and CNM contact information reviewed.

## 2021-06-11 NOTE — PROGRESS NOTES
"She presents to clinic today with charge.  She is feeling well, she denies any warning signs or symptoms.  She notes good fetal movement movement.  The patient does note that a few weeks ago she noticed some discharge which she describes as \"green\" and mucousy.  She denies any itchiness or any change in odor or other changes in her vaginal discharge otherwise.  She states that she has had a yeast infection in the past but these symptoms felt different.  She denies any continued discharge and denies any signs or symptoms of leaking amniotic fluid.  Offered screening for gonorrhea and chlamydia as well as a wet prep to screen for trichomonas yeast or bacterial vaginosis and patient declines any of these tests today.  Patient has questions on pediatrician.  She states that she wants to go to her pediatrician versus a family practice physician.  She is interested in staying within the [facility] system but may also look at [facility] for her [facility].  She has not yet decided.  Davon and Cayla do have a car seat that they will be installing today or this weekend.  We discussed food and drinks to bring into labor.  She will pick those up this week and have them ready.  She is otherwise feeling well and feels ready for this labor.  Declines cervical exam today.  Patient is measuring 36 cm at 38 weeks, however she also was measuring 33 cm at 36 weeks at her last visit and then had a growth ultrasound performed on May 24, 2017.  That growth ultrasound showed her to have an estimated fetal weight of 2856 g with an estimated fetal weight percentile of 72nd percentile with normal growth interval.  The plan is to return to clinic in 1 week.  She has CNM on-call number and knows when to call.  "

## 2021-06-11 NOTE — PROGRESS NOTES
In reviewing chart, shows + HIV AB, last tested in November. Routine OB note mentions discussing re-testing, but patient declined. Consider offering HIV 4th generation test in hospital.   NELDA Retana, CNM  [facility]

## 2021-06-12 NOTE — PROGRESS NOTES
"  Assessment:        Cayla Lerner is a 27 y.o. female here for postpartum exam at 6 weeks postpartum. Pap smear not done at today's visit.   Healed 2nd degree laceration  Lactating     Plan:        1. Discussed new parent adjustments (emotional and logistics), return to work, pumping plan and milk storage, perineal care and return to intercourse; discussed birth control options (including non-hormonal methods, barrier methods, NFP) and child spacing. Reviewed s/sx postpartum depression and other mood disorders.  Discussed postpartum exercises, pelvic floor strengthening and abdominal strengthening.  2. Contraception: IUD.  Considering IUD use, information given, consult visit performed discussing Paraguard risk, benefits, alternatives, placement plan. Encouraged abstaining until insertion.  Discussed patient handout and given information. Also information given on NFP and BBT.  3. Return to clinic in one year or as needed for IUD insertion.   4. Pap smear due in 2/2019.    Subjective:       Cayla Lerner is a 27 y.o. female who presents for a postpartum visit. She is 6 weeks postpartum following a spontaneous vaginal delivery. I have fully reviewed the prenatal and intrapartum course. The delivery was at 39 gestational weeks. Outcome: spontaneous vaginal delivery. Postpartum course has been uncomplicated. Baby's course has been uncomplicated. Baby  \"Sonya\" is feeding by breast. Bled for  4 weeks postpartum.  Currently bleeding  no bleeding. Bowel function is normal. Bladder function is normal. Patient has not resumed intercourse. Contraception method is undecided, considering non-hormonal options including Paraguard, condoms, NFP and diaphragm use.. Postpartum depression screening score: 0. Denies any changes or concerns regarding mood.  She is sleeping 3-4 hours at a time, and breastfeeding on-demand, exclusively. Return to work will occur at 12 weeks postpartum.  She is currently pumping 2 times a day " "and storing breastmilk.  She has introduced the bottle without issue.     The following portions of the patient's history were reviewed and updated as appropriate: allergies, current medications, problem list and past family history    Review of Systems  General:  Denies problem  Eyes: Denies problem  Ears/Nose/Throat: Denies problem  Cardiovascular: Denies problem  Respiratory:  Denies problem  Gastrointestinal:  Denies problem, Genitourinary: Denies problem  Musculoskeletal:  Denies problem  Skin: Denies problem  Neurologic: Denies problem  Psychiatric: Denies problem  Endocrine: Denies problem  Heme/Lymphatic: Denies problem   Allergic/Immunologic: Denies problem          Objective:         Vitals:    07/31/17 0928   BP: 96/64   Pulse: 76   Weight: 126 lb (57.2 kg)   Height: 5' 6.5\" (1.689 m)       Physical Exam:  General Appearance: Alert, cooperative, no distress, appears stated age  Breasts: No breast masses, tenderness, asymmetry, or nipple discharge. Lactating bilaterally with nipples intact bilaterally.  No clogged ducts or masses noted.  Axilla WNL.  Heart: Regular rate and rhythm, S1 and S2 normal, no murmur, rub, or gallop,   Abdomen: Soft, non-tender, bowel sounds active all four quadrants,  no masses, no organomegaly. Diastasis  by 1FB.  Pelvic:Normally developed genitalia with no external lesions or eruptions. Vagina and cervix show no lesions, inflammation, discharge or tenderness. No cystocele, No rectocele. Uterus anteverted and midposition, involuted appropriately for 6 weeks postpartum.  No adnexal mass or tenderness.  Extremities: Extremities normal, atraumatic, no cyanosis or edema  Skin: Skin color, texture, turgor normal, no rashes or lesions  Lymph nodes: Cervical, supraclavicular, and axillary nodes normal  Neurologic: Normal      "

## 2021-06-14 NOTE — TELEPHONE ENCOUNTER
Returned telephone call to patient, states she has been sick for the last 3 days with nausea/vomiting with dry heaves. States she can't drink water, has no energy, has been sleeping a lot and has been unable to work. States she has tried ginger ale but is not able to keep that down either. Reports her she is urinating but it is dark in color. Reports the last time she was able to keep liquids down was around lunch time yesterday. Encouraged patient to go to ED for evaluation and IV hydration. Patient states she prefers to stay out of the ED and would prefer to go to an urgent care. Discussed with patient that is a reasonable alternative. Patient has not yet picked up her script of Zofran that was filled on 1/23/2021. Patient states she will  script and then see if symptoms improve. If still unable to tolerate liquids by noon today, recommend patient go to Urgent care for evaluation. Patient in agreement. All questions answered.

## 2021-06-15 NOTE — TELEPHONE ENCOUNTER
RN cannot approve Refill Request    RN can NOT refill this medication med is not covered by policy/route to provider. Last office visit: 5/15/2019 Luz Marina Mayen MD Last Physical: Visit date not found Last MTM visit: Visit date not found Last visit same specialty: Visit date not found.  Next visit within 3 mo: Visit date not found  Next physical within 3 mo: Visit date not found      Tammie F Severson, [facility] Connection Triage/Med Refill 2/13/2021    Requested Prescriptions   Pending Prescriptions Disp Refills     ondansetron (ZOFRAN) 4 MG tablet [Pharmacy Med Name: ONDANSETRON 4MG TABLETS] 30 tablet 0     Sig: TAKE 1 TO 2 TABLETS(4 TO 8 MG) BY MOUTH EVERY 8 HOURS AS NEEDED FOR NAUSEA       There is no refill protocol information for this order

## 2021-06-15 NOTE — PROGRESS NOTES
PRENATAL VISIT   FIRST OBSTETRICAL EXAM - OB   Assessment / Impression   1.  30-year-old  3 para 2-0-0-2 with IUP at 10 weeks 3 days by certain LMP 2020, RAQUEL 2021  2.  Size equals dates  3.  Nausea and vomiting of pregnancy managed with Zofran  Plan:   -Prescription for Zofran sent to preferred pharmacy.  -Declines first trimester ultrasound.    -IOB labs drawn.   -Pt is not at risk for and therefore not interested in drawing lead level.   -Reviewed prenatal care schedule.   -Optimal nutrition and weight gain discussed. Pregnancy weight gain of 25-35 lbs (BMI 18.5-24.9) encouraged.   -Anticipatory guidance for common pregnancy questions and concerns reviewed.   -Danger s/sx for this trimester reviewed with patient.   -Reviewed genetic screening options with patient, patient does not elect for first trimester screening. The patient does not elect for quad screening.   -Reviewed carrier testing options with patient, patient does not elect for testing or referral to genetic counseling.  -IOB packet given and reviewed with patient.   -CNM services and hospital options reviewed; emergency and scheduling phone numbers given to patient.     The patient has the following high risk factors for preeclampsia: None. Therefore, low-dose aspirin will not be initiated.    The patient has the following moderate risk factors for preeclampsia:None.  Because the patient .does not have two or more risk factors, low-dose aspirin will not be initiated   -Antepartum VTE risk factors absent.  -Patient is not at increased risk for overt diabetes, so A1C will not be added to IOB labs today.  -Pt is nota candidate for an antepartum OB consult.    -Return to clinic in 4 weeks or sooner as needed.    Total time spent with patient: 40 minutes, >50% time spent counseling and coordinating care.   Subjective:   Cayla Lerner is a 30 y.o. G 3 P  here today for her first obstetrical exam at Unknown. Here with Her  Davon.  This pregnancy is planned Attempting pregnancy for 2 months. The patient reports nausea and  vomiting much improved with the use of Zofran prepregnancy weight was 120 pounds, now she weighs 123 pounds, fatigue and some mild breast tenderness.  Patient's last menstrual period was 2020 (exact date). predicting an expected date of delivery of Estimated Date of Delivery: 2021. Last period was normal. Her previous three cycles were regular. Her pregnancy is dated by certain LMP.   The patient states that she is in a monogamous relationship. Offered GC/CT screening today and patient accepts.  Current symptoms also include: breast tenderness, fatigue, morning sickness and nausea.   Risk factors: None. Pregnancy Risk Factors: None   VTE antepartum risk factors (two or more risk factors, or 1 * risk factor, places patient at higher risk): none.   Clinical history/risk factors requiring antepartum OB consult: none.   The patient has the following risk factors for overt diabetes: Not at increased risk, BMI normal (or under 23 for  Americans). Plus the additional risk factor(s) of: No additional risk factors.  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   OB History    Para Term  AB Living   3 2 2     2   SAB TAB Ectopic Multiple Live Births         1 2      # Outcome Date GA Lbr Sixto/2nd Weight Sex Delivery Anes PTL Lv   3 Current            2 Term 19 39w6d 01:24 / 00:07 6 lb 9.8 oz (3 kg) F Vag-Spont None N TERRY   1A             1B Term 17 40w1d  7 lb 6 oz (3.345 kg) F Vag-Spont Inhalation  TERRY      Birth Comments: 20 min 2nd stage      History:   Past Medical History:   Diagnosis Date     Headache      Hx of varicella     had chicken pox as a child      Past Surgical History:   Procedure Laterality Date     WISDOM TOOTH EXTRACTION        Family History   Problem Relation Age of  "Onset     Depression Mother         on meds     Alcohol abuse Father      Depression Brother         sometimes on meds     Breast cancer Maternal Grandmother 60        mets to bone     Depression Maternal Grandmother      Arthritis Maternal Grandfather      Hearing loss Maternal Grandfather      Alcohol abuse Maternal Grandfather      Vision loss Paternal Grandmother      Alcohol abuse Paternal Grandfather      Hypertension Paternal Grandfather      Alcohol abuse Paternal Aunt      Alcohol abuse Paternal Uncle      Diabetes type I Paternal Uncle      Alcohol abuse Maternal Uncle      Depression Maternal Uncle       Social History     Tobacco Use     Smoking status: Never Smoker     Smokeless tobacco: Never Used   Substance Use Topics     Alcohol use: No     Comment: none while pregnant     Drug use: No      Current Outpatient Medications   Medication Sig Dispense Refill     ondansetron (ZOFRAN) 4 MG tablet Take 1 tablet (4 mg total) by mouth every 8 (eight) hours as needed for nausea. Take 4-8 mg q 8 hours prn for nausea. 30 tablet 0     multivitamin therapeutic tablet Take 1 tablet by mouth daily.       ondansetron (ZOFRAN) 8 MG tablet Take 1 tablet (8 mg total) by mouth every 8 (eight) hours as needed for nausea. 18 tablet 3     No current facility-administered medications for this visit.       No Known Allergies   The patient's medical, surgical and family histories were reviewed and were pertinent to this visit.   Pap smear: Last Pap: April 3, 2019, Result: Normal, Previous History: Normal, Any history of abnormal: None. Next Due: 2022.       EPDS score today: 3/30 .\"  0\" to #10   History of anxiety or depression: no    Review of Systems   General: Fatigue but otherwise denies problem   Eyes: Denies problem   Ears/Nose/Throat: Denies problem   Cardiovascular: Denies problem   Respiratory: Denies problem   Gastrointestinal: Nausea without vomiting, otherwise negative   Genitourinary: Denies any discharge, vaginal " "bleeding or itchiness or any other problem   Musculoskeletal: Breast tenderness otherwise denies problem   Skin: Denies problem   Neurologic: Denies problem   Psychiatric: Denies problem   Endocrine: Denies problem   Heme/Lymphatic: Denies problem   Allergic/Immunologic: Denies problem       Objective:   Objective    Vitals:    02/11/21 1402   BP: 116/62   Pulse: 76   Weight: 123 lb (55.8 kg)   Height: 5' 6\" (1.676 m)      Physical Exam:   General Appearance: Alert, cooperative, no distress, appears stated age   MAEVE: Normocephalic, without obvious abnormality, atraumatic. Conjunctiva/corneas clear, does not wear corrective lenses   Lungs: Clear to auscultation bilaterally, respirations unlabored   Heart: Regular rate and rhythm, S1 and S2 normal, no murmur, rub, or gallop. No edema to lower extremities.   Abdomen: Soft, non-tender.   FHT: 168 bpm  Musculoskeletal: Extremities normal, atraumatic   Skin: Skin color, texture, turgor normal   Neurologic: Alert and oriented x 3. Normal speech     "

## 2021-06-15 NOTE — PROGRESS NOTES
Cayla is here alone for a routine prenatal visit at 14w0d. Reviewed IOB labs. Disc option for genetic screening: quad screen, single marker AFP, NIPT. Pt declines all. Accepts referral for anatomy scan ordered at [facility] for 20 weeks. Nausea is improved, still taking Zofran. No weight gain since last visit. Question about if she should take COVID vaccine. Will send info on [email or web address] that will hopefully help her make an informed decision.     Early second trimester pregnancy anticipatory guidance reviewed.     Enc follow-up in 4 weeks or sooner prn.

## 2021-06-15 NOTE — PATIENT INSTRUCTIONS - HE
[facility]- Contact information:  Appointment line and to get a hold of CNM in clinic Monday-Friday 8 am - 5 pm:  [phone].  There are some clinics with early start times (1st appointment 7:40 am) and others with evening hours (last appointment 6:20 pm).  Most are typically open from 8 am to 5 pm.    CNM on call answering service: [phone].  Specify your hospital of choice and leave a brief message for CNM;  will then page CNM who is on call at your specified hospital and you should receive a call back with 15 minutes.  Be sure that your ringer is audible and that you can accept blocked calls so that we can get back in touch with you! This number should be reserved for urgent needs if during the day, before 8 am, after 5 pm, weekends, holidays.    Contact the on-call CNM with warning signs, such as:    vaginal bleeding     Vaginal discharge and itching or pain and burning during urination    Leg/calf pain or swelling on one side    severe abdominal pain    nausea and vomiting more than 4-5 times a day, or if you are unable to keep anything down    fever more than 100.4 degrees F.         Touring the Maternity Care Center  At this time we are offering a virtual tour of the Maternity Care Centers at both [facility] and [facility]:   [facility]: [email or web address]://[email or web address]/[email or web address]/Maternity-Care-Center  [place]:   https://Pending sale to [facility].org/overarching-care/the-birthplace/tours  https://[email or web address].[email or web address]/[facility]/Maternity-Care-Center/#virtual_tour  When in person tours become available, registrations is required. To schedule a tour at either [place] or [facility], please do so online using the following links:  [facility] - [email or web address]?s=[email or web address]&[email or web address]&[email or web address]&p=[email or web address]&hdixl=101&ps=1&[email or web address]&it=1&zig=765  [facility]  "[email or web address]://[email or web address].[email or web address].[email or web address]?s=[email or web address]&[email or web address]&[email or web address]&[email or web address]&[email or web address]&ps=1&group=38&it=1&gzj=984         Meet the Midwives from [facility]  You are invited to an informational meet and greet with [facility] Certified Nurse-Midwives. Our free \"Meet the Midwives\" event is a great opportunity to learn about our midwives' philosophy and experience, the hospitals where we can assist with your birth, and answer questions you may have. Partners, friends, and family are welcome to attend. Currently, this is a virtual event.  Date  First Tuesday of every month at 7 pm.    Link to next (live) meeting  [email or web address]://[email or web address]/classes-and-[email or web address]/meet-the-midwives-from-[facility]  To Join by Telephone (audio only) Call:   [phone] Phone Conference ID: [phone]#        Ultrasound Appointment:   Don't forget to schedule your ultrasound appointment around 20 weeks into your pregnancy. Your midwife will order the exam for you to schedule at [phone] with [facility] radiology locations or at the independent radiology clinic of your preference.      GENETIC SCREENING OPTIONS AT [facility]          All testing is optional. We don t recommend or discourage any test; it is totally up to you and your partner. Some couples wish to know their risk of having a baby with a genetic defect and others do not. We will support your decision. Abnormal results may lead to a discussion of options for further testing.    Accurate dating of your pregnancy is important for all testing so an ultrasound may be done prior to referral or testing.    No testing provides certainty; there are false positives and negatives associated with all testing, some more than others.    Most genetic testing is non-invasive (requires only a blood sample and sometimes an ultrasound or both).    It is always wise to check with your insurance carrier before proceeding.    Some testing can be done " at our lab and some require a referral.    If you decide to do no testing, the 20 week ultrasound scan has some ability to detect abnormalities in the baby.    [place] or [employer]    At 10 wk or greater, a blood sample can be drawn here at clinic or at any of our referral offices. It will provide highly accurate results with low false positive rates for trisomy 18, trisomy 21 (Down Syndrome), and trisomy 13 (> 99% trisomy detection rate at a false positive rate of <0.1%). Gender identification can also be obtained if desired.    Quad Screen (4-marker screen)    Between 15 and 21 weeks, a sample of blood can be drawn at our lab to assess your risk of having a baby with Down Syndrome, Trisomy 18 and neural tube defects. Such testing is able to detect these conditions in 80-90% of cases and the false-positive rate is approximately 5%.     Why is dental care in pregnancy important?  During pregnancy, you are more likely to have problems with your teeth or gums. If you have an infection in your teeth or gums, the chance of your baby being premature (born early) or having low birth weight may be slightly higher than if your teeth and gums are healthy  Dental care is safe during pregnancy and important for the health of you and your baby.   What should you know before you see the dentist?    Make sure your dentist knows that you are pregnant.    If medications for infection or for pain are needed, your dentist can prescribe ones that are safe for you and your baby.    Tell your dentist about any changes you have noticed since you became pregnant and about any medications r or supplements you are taking.    Routine x-rays should be avoided in pregnancy, but it may be necessary if there is a problem or an emergency.     Your body should be covered with a lead apron to protect you and your baby.    Dental work can be done safely at any point in pregnancy. If possible, it is best to delay treatments and pro- cedures until after  the first trimester.    For more information on dental health in pregnancy: http://onlinelibrary.bergman.com/store/10.1111/jmwh.25257/asset/lrrl85300.pdf?v=1&t=xtbp2r51&z=9823[email or web address]     Quickening:   Your Baby in the Second Trimester of Pregnancy  ; At the start of the second trimester, you will feel your baby's movements, which get stronger as the baby grows bigger. At the end of the fourth month, your baby weighs about five ounces. Her kidneys begin to produce urine. During visits to your health care provider, you will be able to hear your baby's heartbeat more clearly. Your baby can move and hear your voice.   ; By the end of the fifth month, you'll be able to feel light movements (called quickening) of your fetus. Your baby is sleeping and waking at regular intervals, and is more active than before. At this point, she is about nine inches long and weighs about one-half to one pound. During the sixth month, your baby's features become clearer. Eyebrows, eyelashes, and hair are developing. She also has finger and toe prints, and may be kicking strongly.  ; By the end of the second trimester, your baby weighs as much as two pounds and is about 11 inches long.         Gestational diabetes  Gestational diabetes develops during pregnancy (gestation). Like other types of diabetes, gestational diabetes affects how your cells use sugar (glucose). Gestational diabetes causes high blood sugar that can affect your pregnancy and your baby's health.  Any pregnancy complication is concerning, but there's good news. Expectant moms can help control gestational diabetes by eating healthy foods, exercising and, if necessary, taking medication. Controlling blood sugar can prevent a difficult birth and keep you and your baby healthy.  In gestational diabetes, blood sugar usually returns to normal soon after delivery. But if you've had gestational diabetes, you're at risk for type 2 diabetes. You'll  continue working with your health care team to monitor and manage your blood sugar.    Who is at risk?  This is a list of factors that increase the risk of developing gestational diabetes for women during pregnancy:        Overweight prior to pregnancy (20% or more over ideal body weight)        High risk ethnic group: , , ,         Impaired glucose tolerance or traces of glucose in the urine        Family history of diabetes        Previously giving birth to a baby over 9 lbs. or stillborn        Previous pregnancy with gestational diabetes    Prevention:  There are no guarantees when it comes to preventing gestational diabetes -- but the more healthy habits you can adopt before pregnancy, the better. If you've had gestational diabetes, these healthy choices may also reduce your risk of having it in future pregnancies or developing type 2 diabetes down the road.    Eat healthy foods. Choose foods high in fiber and low in fat and calories. Focus on fruits, vegetables and whole grains. Strive for variety to help you achieve your goals without compromising taste or nutrition. Watch portion sizes.     Keep active. Exercising before and during pregnancy can help protect you from developing gestational diabetes. Aim for 30 minutes of moderate activity on most days of the week. Take a brisk daily walk. Ride your bike. Swim laps.  If you can't fit a single 30-minute workout into your day, several shorter sessions can do just as much good. Park in the distant lot when you run errands. Get off the bus one stop before you reach your destination. Every step you take increases your chances of staying healthy.    Lose excess pounds before pregnancy. Doctors don't recommend weight loss during pregnancy. But if you're planning to get pregnant, losing extra weight beforehand may help you have a healthier pregnancy.  Focus on permanent changes to your eating habits. Motivate yourself by  remembering the long-term benefits of losing weight, such as a healthier heart, more energy and improved self-esteem.    Preventing Diabetes after Pregnancy:  It is estimated that 35-60 percent of women that have had gestational diabetes will develop type 2 diabetes in the future. It is also thought that children from these pregnancies have a greater chance of developing obesity and type 2 diabetes.    If you do have prediabetes or have risk factors for having diabetes, research shows that doing just two things can help you prevent or delay type 2 diabetes: Lose 5% to 7% of your body weight, which would be 10 to 14 pounds for a 200-pound person; and get at least 150 minutes each week of physical activity, such as brisk walking.    RESOURCES   You can refer to the Starting Out Right book or find it online at http://www.[employer].org/images/stories/maternity/[facility]-Starting-Out-Right.pdf p  You can sign up for a weekly parenting e-mail that gives support, tips and advice from health care professionals that starts with pregnancy and continues through the toddler years. To register, go to www.healthD[employer]O [employer].org/baby at any time during your pregnancy.    Breastfeeding Information:  OUTPATIENT LACTATION RESOURCES    -Schedule a clinic appointment with a [facility] [place] [facility] with dedicated clinic hours for breastfeeding assistance by calling [phone]. Breastfeeding clinic visits are at [facility] on Wednesdays, [facility] on Tuesdays and [place] [facility] on Thursdays.     -Baby Café    Pregnant and interested in breastfeeding?  Need answers to breastfeeding questions?  Want to help breastfeeding moms?  Already breastfeeding and want to meet other moms?    Join us at the Baby Café!    Baby Cafe is a free, drop-in service offering breast-feeding support for pregnant women, breast-feeding mothers and their families.  Come share tips and socialize with other mothers.  Babies and  siblings are welcome (no childcare available).    Starting April 2018, Baby Café will be at 4 locations.  Please see below for the Baby Café closest to you!      [facility]  [address] [place] [address]  1st Wednesday: 10am-12pm    [facility]  [address] [place] [address]  3rd Wednesday 4-6pm    [facility]  [address] [place] [address]  4th Wednesday 10am-12:30pm    [facility]  [address] [place] [address]  4th Wednesdays: 4-6pm      Hmong, Omani, and Salvadorean which is may be available at some sites.    For more information, please contact: [email or web address]. or [phone]    -Attend a baby weigh in at [facility].  Lactation consultants are available to answer questions  [place]: Tuesdays 1:00 - 2:00  [facility]: Mondays 1:00 - 2:00   www.placespourtous.com    -Attend one of the New Mama groups at [facility] in [facility].  [facility] also offers one-on-one in home and in office lactation consults.   www.Soraa    -Attend a LeLeche League meeting.  Multiple groups in several locations throughout the [facility]. The meetings are no-cost and always informative breastfeeding education session through [employer] La Leche League  Www.londas.org/  Medication use while breastfeeding: http://toxnet.nlm.nih.gov/newtoxnet/lactmed.htm     Childbirth and Parenting Education:   [place] parenting center: http://placespourtous.com/   (620) 207-BABY  [place]: (education, yoga & wellness) www.OpenPortal  [facility]: www.Soraa   Childbirth collective: (Parent topic nights)  www.childbirthcollective.org/  Hypnobabies:  www.hypnobabiestwincities.com/  Hypnobirthing:  Http://hypnobirthing.com/  The Birth Hour: https://thebirthhour.com/online-childbirth-class/    APPS and Podcasts:   Mary Bonnerture  The Birth Hour (for birth stories)    Birthful   Expectful   The Longest Shortest Time  PregnancyPodcast Berenice Cheriseronnie    Book Recommendations:   Latoya Mandy's Birthing From Within--first few chapters include a new-age tone, you may prefer to skip it and keep going, because there is good stuff later.  This book recommendation covers emotional preparation, but does cover coping with pain, and use of both pharmacological and nonpharmacological methods.    Dr. Pena' The Pregnancy Book and The Birth Book--the pregnancy book goes month-by month    Womanly Art of Breastfeeding by La Leche League International   Bestfeeding by Fariba Ramos--great pictures    Mothering Your Nursing Toddler, by Karine Medrano.   Addresses dealing with so many of the challenging behaviors of a nursing toddler.  How Weaning Happens, by La Leche League.  Discusses weaning at all ages, from medically necessary weaning of an infant, all the way up to age 5 (or older), with why/why not, and strategies.  Very empowering book both for deciding to wean and deciding not to.    American College of Nurse-Midwives (ACNM) http://www.midwife.org/; look at the informational handouts at http://www.midwife.org/Share-With-Women     www.mymidwife.org    Mother to Baby (Medication and Herbal guidance in pregnancy): http://www.mothertobaby.org  Toll-Free Hotline: 707.120.9378  LactMed (Medication use while breastfeeding): http://toxnet.nlm.nih.gov/newtoxnet/lactmed.htm    Women's Health.gov:  http://www.womenshealth.gov/a-z-topics/index.html    American pregnancy association - http://americanpregnancy.org    Centering Pregnancy (group prenatal care option): http://centeringhealthcare.org    Information about doulas:  Childbirth collective: http://www.childbirthcollective.org/  Doulas of [place] (SCOTT):  www.scott.org  [employer] [facility]  project: http://[employer].citiesdoulaproject.com/     Early Childhood and Family Education (ECFE):  ECFE offers parents hands-on learning experiences that  "will nourish a lifetime of teachable moments.  http://ecfe.info/ecfe-home/    March of Dimes www.LaunchGram.AutoGnomics     FDA - Nutrition  www.mypyramid.gov  Under \"For Consumers,\" click on \"pregnant and breastfeeding women.\"      Centers for Disease Control and Prevention (CDC) - Vaccines : http://www.cdc.gov/vaccines/       When researching information on the web, question the validity of websites.  The domains .gov, .edu and.org tend to be more reliable information.  If there are a lot of advertisements, be cautious of the information provided. Stay away from blogs and chat rooms please!    "

## 2021-06-16 NOTE — PROGRESS NOTES
Cayla Lerner presents to clinic with [place] at 18w0d for a routine prenatal appointment. She reports she is feeling well and has no concerns. Feeling flutters. Denies bleeding or cramping.    Reviewed total weight gain of 9 lb (4.082 kg). Within range for expected total weight gain of 25 lb (11.5 kg)-35 lb (16 kg)    Nausea / vomiting: improved. Gaining weight.    Headache: continues to have intermittent headaches. Generally relieved with tylenol. Concerned about preeclampsia. Reviewed early onset extremely rare, blood pressure normal today, call on-call CNM if headache is not relieved by tylenol or presence of visual disturbances, RUQ pain.    History of preeclampsia: Baseline labs at [facility] normal. Extensive chart review, also consulted with on-call OBGYN. No other explanation found for elevated liver enzymes postpartum in 2019, in agreement with diagnosis of preeclampsia. Will plan for baby aspirin now. Attempted to contact Cayla by telephone to review, will follow up by [employer] message.  Pregnancy checklist updated. Reviewed spacing for 5 weeks and 5 weeks would optimize 28 week visit for GCT and tdap-- Cayla in agreement with this plan, feels comfortable with 5 week visit spacing. Warning signs reviewed. RTC in 5 weeks, sooner if problems.

## 2021-06-17 NOTE — PROGRESS NOTES
Cayla in here for her 23 week prenatal appointment alone today. She reports feeling well overall. Denies any HA, leaking of fluid, vaginal bleeding, visual changes, upper abdominal pain or consistent UCs.  Baby is moving every day.  Reviewed 20 US and NIPT results. She reports feeling reassured by the NIPT.  Initially, she asked for a printed copy of the NIPT but then stated all she needed to do was look at the results on the computer.  Discussed excessive weight gain, healthy nutrition and adequate exercise.  Currently, she gets outside and walks with her children.  This writer recommends 30 minutes daily of walking alone in order to increase stride and therefore her heart rate.  24 hour diet recall performed, and recommended increasing water intake, vegetables and fruit sources.  Her goal is to pack her lunch instead of eating out of the hospital cafeteria which offers carbohydrate rich lunches such as cheeseburger with French fries etc.  RTC in 4-5 weeks for 1 hour GCT, hgb, RPR and please offer Tdap.   labor precautions and danger signs and symptoms reviewed.  All questions answered.  Encouraged to call or return to clinic with any questions, concerns, or as needed.    Patient was seen with student, NAHUN Griffin who was present for learning. I personally assessed, examined and made clinical decisions reflected in the documentation.    Karie Cooney, SHAUNA, APRN

## 2021-06-17 NOTE — PATIENT INSTRUCTIONS - HE
Patient Instructions by Cayla Kennedy APRN, CNM at 4/29/2019  2:00 PM     Author: Cayla Kennedy APRN, CNM Service: -- Author Type: Midwife    Filed: 4/29/2019  1:57 PM Encounter Date: 4/29/2019 Status: Signed    : Cayla Kennedy APRN, CNM (Midwife)       Patient Education     Birth Control: IUD (Intrauterine Device)    The IUD (intrauterine device) is small, flexible, and T-shaped. A trained healthcare provider places it in the uterus. The IUD is one of the most effective birth control methods. It is also reversible. This means it can be removed at any time by a trained healthcare provider. New IUDs are safe and do not have the risks of older types of IUDs.  Pregnancy rates  Talk to your healthcare provider about the effectiveness of this birth control method.  Types of IUDs  IUD insertion is done in the healthcare providers office. Two types of IUDs are available:    The copper IUD releases a small amount of copper into the uterus. The copper makes it harder for sperm to reach the egg. The device works for at least 10 years.    The progestin IUD releases a hormone called progestin. It causes changes in the uterus to help prevent pregnancy. The device works for 3 to 5 years, depending on which device is chosen. It may be recommended for women who have anemia or heavy and painful periods.  IUDs have thin strings that hang from the opening of the uterus into the vagina. This lets you check that the IUD stays in place.  Things to know about IUDs    IUDs can be used by women who have never been pregnant or by women with a history of sexually transmitted infections (STIs) or tubal pregnancy.    It won't move from the uterus to any other part of the body.    There is a slight risk of the device coming out of the vagina (expulsion).    It may not work in women who have an abnormally shaped uterus.    A copper IUD may cause heavier periods and cramping.    Progestin IUD may cause light periods or no periods at  all (irregular bleeding or spotting is possible and normal during first 3 to 6 months).    If you get a sexually transmitted infection with an IUD in place, symptoms may be more severe.  What to report to your healthcare provider  Be sure your healthcare provider knows if you have:    A sexually transmitted infection (STI) or possible STI    Liver problems    Blood clots (for progestin IUD only)    Breast cancer or a history of breast cancer (progestin IUD only)   Date Last Reviewed: 3/1/2017    7548-0865 The [employer]. [address] [place], [place] [address]. All rights reserved. This information is not intended as a substitute for professional medical care. Always follow your healthcare professional's instructions.

## 2021-06-18 ENCOUNTER — PRENATAL OFFICE VISIT - HEALTHEAST (OUTPATIENT)
Dept: MIDWIFE SERVICES | Facility: CLINIC | Age: 31
End: 2021-06-18

## 2021-06-18 DIAGNOSIS — Z34.80 SUPERVISION OF OTHER NORMAL PREGNANCY, ANTEPARTUM: ICD-10-CM

## 2021-06-18 DIAGNOSIS — R74.8 ABNORMAL LIVER ENZYMES: ICD-10-CM

## 2021-06-18 LAB
ALBUMIN SERPL-MCNC: 2.8 G/DL (ref 3.5–5)
ALP SERPL-CCNC: 50 U/L (ref 45–120)
ALT SERPL W P-5'-P-CCNC: 18 U/L (ref 0–45)
ANION GAP SERPL CALCULATED.3IONS-SCNC: 7 MMOL/L (ref 5–18)
AST SERPL W P-5'-P-CCNC: 14 U/L (ref 0–40)
BILIRUB SERPL-MCNC: 0.3 MG/DL (ref 0–1)
BUN SERPL-MCNC: 8 MG/DL (ref 8–22)
CALCIUM SERPL-MCNC: 7.8 MG/DL (ref 8.5–10.5)
CHLORIDE BLD-SCNC: 102 MMOL/L (ref 98–107)
CO2 SERPL-SCNC: 24 MMOL/L (ref 22–31)
CREAT SERPL-MCNC: 0.6 MG/DL (ref 0.6–1.1)
FASTING STATUS PATIENT QL REPORTED: NO
GFR SERPL CREATININE-BSD FRML MDRD: >60 ML/MIN/1.73M2
GLUCOSE 1H P 50 G GLC PO SERPL-MCNC: 127 MG/DL (ref 70–139)
GLUCOSE BLD-MCNC: 140 MG/DL (ref 70–125)
HGB BLD-MCNC: 13.2 G/DL (ref 12–16)
POTASSIUM BLD-SCNC: 3.7 MMOL/L (ref 3.5–5)
PROT SERPL-MCNC: 5.4 G/DL (ref 6–8)
SODIUM SERPL-SCNC: 133 MMOL/L (ref 136–145)

## 2021-06-18 ASSESSMENT — MIFFLIN-ST. JEOR: SCORE: 1371.32

## 2021-06-19 LAB — T PALLIDUM AB SER QL: NEGATIVE

## 2021-06-19 NOTE — PROGRESS NOTES
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 No Pap test indicated-- due 2019. HIV 1&2 drawn as hx of positive 4th generation test. No known exposure. Declined further testing in first pregnancy. Low risk for HIV.   -No indication for lead draw.   -Patient is not interested in water-birth.   -Reviewed prenatal care schedule and discussed routine OB visits versus problem visits and referrals. Also discussed use of ultrasound in pregnancy, recommended at this time due to 1 cycle since stopped nursing, referral placed.   -Optimal nutrition and weight gain discussed. Pregnancy weight gain of 28-40 lbs (BMI < 18.5) encouraged.   -Reviewed importance of regular exercise in pregnancy and help with low back pain and other pregnancy symptoms.   -Discussed importance of taking aprenatal vitamin with the goal of having 400 mcg of folic acid. Additionally taking a Vitamin D supplement (1,000-2,000 IU/day) and an Omega 3/fish oil/DHA is beneficial and important for fetal eye and brain development.   -Anticipatory guidance for common pregnancy questions and concerns reviewed.   -Danger s/sx for this trimester reviewed with patient.  -Reviewed genetic carrier screening with focus on Spinal Muscular Atrophy (SGA), Cystic Fibrosis (CF), hemoglobinopathies and Fragile X syndrome. Patient declines  -Reviewed genetic aneuploidy screening options-- patient declines.   -Reviewed MyChart, lab results, and how lab results are disseminated.   -IOB packet given and reviewed with patient, discussed standards of care, scope of practice, clinic and hospital settings, also reviewed clinic versus emergency phone number.   -Discussed baby friend hospitals and policies.  -Discussed measures to improve nausea, RX sent for Compazine but reviewed concern for TD with  extended use, recommend no more than 6 weeks of use. Reviewed why we don't recommend use of Zofran.  -No pelvic performed, proven pelvis, no sx and dating US to be performed.   -No breast exam performed, reviewed literature and patient declined.   -[facility] services and hospital options reviewed; emergency and scheduling phone numbers given to patient.  -Return to clinic 4-6 weeks.    Total time spent with patient: 40 minutes, >50% time spent counseling and coordinating care.  NELDA Antonio, Pending sale to [facility]      Subjective:    Cayla Lerner is a 28 y.o.  here today for her First Obstetrical Exam. This is an unplanned but desired pregnancy. Had stopped nursing when was beginning to track cycle but only had one cycle. Struggling nausea and vomiting, similar to first pregnancy, has been using unisom and b6 which is somewhat helpful but leading to fatigue. Used compazine in the past and desires to use again, has questions about Zofran use.   No cramping or bleeding or back pain.       Exercise: None-- has a gym membership and aware of benefits, reviewed recommendations in pregnancy.   Safety (seatbelt, gun access, IPV, hx abuse):yes/no/no/no  Tobacco/Alcohol/Drug Use: no  EPDS today: 1  Sexual Partners: 1      Education level: College  Occupation: RN- moving into management role  Partners name: Ulises     OB History    Para Term  AB Living   2 1 1   1   SAB TAB Ectopic Multiple Live Births      1 1      # Outcome Date GA Lbr Sixto/2nd Weight Sex Delivery Anes PTL Lv   2 Current                         1B Term 17 40w1d  7 lb 6 oz (3.345 kg) F Vag-Spont Inhalation  TERRY          Expected Date of Delivery: 2019, Alternate RAQUEL Entry    Past Medical History:   Diagnosis Date     Hx of varicella     had chicken pox as a child     Past Surgical History:   Procedure Laterality Date     WISDOM TOOTH EXTRACTION       Social History   Substance Use Topics     Smoking status: Never  "Smoker     Smokeless tobacco: Never Used     Alcohol use No      Comment: none while pregnant     Current Outpatient Prescriptions   Medication Sig Dispense Refill     pyridoxine, vitamin B6, (VITAMIN B-6) 50 MG tablet Take 50 mg by mouth daily.       prochlorperazine (COMPAZINE) 5 MG tablet Take 1 tablet (5 mg total) by mouth every 6 (six) hours as needed for nausea. 60 tablet 1     No current facility-administered medications for this visit.      No Known Allergies          Pregnancy Risk Factors: Low pre-pregnant weight       Review of Systems  General:  Denies problem  Eyes: Denies problem  Ears/Nose/Throat: Denies problem  Cardiovascular: Denies problem  Respiratory:  Denies problem  Gastrointestinal:  Denies problem  Genitourinary: Denies problem  Musculoskeletal:  Denies problem  Skin: Denies problem  Neurologic: Denies problem  Psychiatric: Denies problem  Endocrine: Denies problem  Heme/Lymphatic: Denies problem   Allergic/Immunologic: Denies problem       Objective:   Objective    Vitals:    07/30/18 1638   BP: 108/60   Pulse: 88   Weight: 110 lb (49.9 kg)   Height: 5' 6\" (1.676 m)     Physical Exam:  General Appearance: Alert, cooperative, no distress, appears stated age  Head: Normocephalic, without obvious abnormality, atraumatic  Eyes: Conjunctiva/corneas clear, does wear corrective lenses  Neck: Supple, symmetrical, trachea midline, no adenopathy  Thyroid: not enlarged, symmetric, no tenderness/mass/nodules  Back: Symmetric, no curvature, ROM normal, no CVA tenderness  Lungs: Clear to auscultation bilaterally, respirations unlabored  Heart: Regular rate and rhythm, S1 and S2 normal, no murmur, rub, or gallop  Breasts: Deferred  Abdomen: Soft, non-tender, bowel sounds active all four quadrants, no masses.   FHT: 175  Extremities: Extremities normal, atraumatic, no cyanosis or edema  Skin: Skin color, texture, turgor normal, no rashes or lesions  Lymph nodes: Cervical, supraclavicular, and axillary " nodes normal  Neurologic: Alert and oriented x 3.

## 2021-06-20 NOTE — PROGRESS NOTES
Cayla Lerner presents with her spouse today.  She states she is feeling much better, she did have a lot of nausea up through about 15 weeks of pregnancy, the Compazine did not help much so she did not take it.  Her fatigue is also improved.  She shares that she has began to experience quickening.  She denies any cramping, no vaginal bleeding, and no change to vaginal discharge is concerning.  She has been able to be more physically active.  She has also gained weight which she feels good about.  Anjel sent for 20 week US.  Patient will self-schedule. Discussed how these results will be disseminated with patient.  We reviewed warning signs and reasons to call, patient has numbers.  Return to clinic in 4-6 weeks.

## 2021-06-21 NOTE — PROGRESS NOTES
Cayla Lerner presents today with her spouse today. Reports normal fetal movement, has reviewed 20 week US and no concerns. Weight gain is appropriate, good appetite. Denies any cramping or change to discharge. Denies any HA or vision changes, feels well! Encouraged to call with any concerning change to discharge, discussed normal parameters for fetal movement and signs of contractions.   With doppler unable to hear FHTs directly, only able to hear souffle/placental blood flow which was 140s, able to see cardiac activity with hand held doppler, fetus extremely active during examination, offered US but shared my reassurance with ultrasound, patient plans to RTC in 2 weeks for glucose testing and for FHT check. Patient declines RPR.

## 2021-06-21 NOTE — PROGRESS NOTES
Cayla Lerner presents with spouse today.  Reports normal fetal movement. Denies cramping, bleeding or leaking of fluid. Reviewed appropriate weight gain. Second trimester lab testing today.  Tests include 1 hr GCT, HGB. RPR declined at this visit and in hospital. Reviewed warning signs with emphasis on  labor/contractions and fetal movement. No plan for CBE, encouraged that Cayla and spouse discuss previous birth and any concerns they may have with how things went  Discussed feeding preference: Breast. Patient shared difficulty with clogged ducts and mastitis, discussed ways to reduce this risk.  Reviewed baby friendly policies including rooming in which helps mom learn how to care for baby and learn baby's cues including feeding cues. Discussed skin to skin and the benefit to regular baby's body temperature. Discussed that feeding frequently after birth will increase milk supply.   Receiving flu vaccine today. Warning s/sx reviewed with patient.  I recommended she call the [facility] on-call at [phone] in the case that she has greater than 5 contractions in one hour, leaking of watery fluid from her vagina, bright red vaginal bleeding, or feels her baby moving less or doesn't feel her baby moving normally.  I also recommend that she call with any severe headaches that are not alleviated with Tylenol, rest, or hydration, vision changes with change to swelling that seems significant, or with any epigastric or upper right-sided pain.  RTC in 6 weeks or sooner PRN. Patient plans tdap immunization at next visit.

## 2021-06-22 NOTE — PROGRESS NOTES
Of note,  at  visit 25   Cayla Lerner presents with spouse today. We reviewed labs from last visit.  Reviewed intervals through the end of pregnancy.  Discussed benefits of writing a birth planner discussing birth preferences together and letting midwife know.  Denies any PTL,  contractions, leaking of garg, or change to fetal movement.  He does share that she is having increased vaginal discharge that is yellow/white and thin, but denies any itching but does notice some vaginal irritation and discomfort with sex.  We discussed that this could be a vaginal yeast infection or bacterial vaginosis, discussed the sequela of these infections as well as treatment.  Offered pelvic exam the patient declines, is open to performing a self collected wet prep.  Patient prefers a my chart message should there be anything of concern.  Discussed that if nothing is found on wet prep could consider over-the-counter Monistat for 7 days and Cayla shares that she likely will not use this but is encouraged to call back with worsening symptoms.  Reviewed weight gain, overall weight gain slightly excessive, discussed eating plenty of protein as well as fresh fruits and vegetables and getting physical activity and on a daily basis.  Warning s/sx reviewed with patient.  I recommended she call the [facility] on-call at [phone] in the case that she has greater than 5 contractions in one hour, leaking of watery fluid from her vagina, bright red vaginal bleeding, or feels her baby moving less or doesn't feel her baby moving normally.  I also recommend that she call with any severe headaches that are not alleviated with Tylenol, rest, or hydration, vision changes with change to swelling that seems significant, or with any epigastric or upper right-sided pain.  Patient shares that she is concerned about the fact that her fundal height is less than usual, she shares that she was measuring smaller with her first daughter and found  that the baby was low in her pelvis and when her daughter was born she had a hematoma which they believe was because she was so low on her pelvis for a long time, we discussed that ideally amniotic fluid reduces that risk but there is no way that we could necessarily prevent that from happening, discussed that at 36 weeks we will perform a cervical exam and will also be able to be aware of fetal station.

## 2021-06-23 NOTE — TELEPHONE ENCOUNTER
Forms printed and faxed to [facility] for completion.  Patient has an appointment today, 1/28/19, at 4:20 PM.

## 2021-06-23 NOTE — PROGRESS NOTES
Cayla Lerner presents with spouse.  Reviewed GBS testing results-- negative. Discussed hemoglobin results. Reviewed growth US as appropriate and vertex presentation, Cayla is wondering if due date would change based on US, we discussed margin of error at this point. . Reports normal FM, no cramping or contractions, no vaginal bleeding. Sleeping the same. No questions or concerns at this time.   Warning s/sx reviewed with patient.  I recommended she call the CN on-call at [phone] in the case that she has signs of labor, leaking of watery fluid from her vagina, bright red vaginal bleeding, or feels her baby moving less or doesn't feel her baby moving normally.  I also recommend that she call with any severe headaches that are not alleviated with Tylenol, rest, or hydration, vision changes with change to swelling that seems significant, or with any epigastric or upper right-sided pain.

## 2021-06-23 NOTE — PATIENT INSTRUCTIONS - HE
[facility] Nurse Midwives - Contact information:  Appointment line and to get a hold of CNM in clinic Monday-Friday 8 am - 5 pm:  [phone].  There are some clinics with early start times (1st appointment 7:40 am) and others with evening hours (last appointment 6:20 pm).  Most are typically open from 8 am to 5 pm.    CNM on call answering service: [phone].  Specify your hospital of choice and leave a brief message for CNM;  will then page CNM who is on call at your specified hospital and you should receive a call back with 15 minutes.  Be sure that your ringer is audible and that you can accept blocked calls so that we can get back in touch with you! This number should be reserved for urgent needs if during the day, before 8 am, after 5 pm, weekends, holidays.    Contact the on-call CNM with warning signs, such as:    vaginal bleeding     Vaginal discharge and itching or pain and burning during urination    Leg/calf pain or swelling on one side    severe abdominal pain    nausea and vomiting more than 4-5 times a day, or if you are unable to keep anything down    fever more than 100.4 degrees F.         You are invited to  Meet the [facility] Nurse-Midwives  A way to tour the hospital Labor and Delivery unit and meet the midwives that attend births since you may not have the opportunity to meet them during your prenatal care.  Some sessions are informal meet and greet type social hours, others address a specific concern or topic.    Thursday, Februrary 22, 2018 7-8pm  [facility]  Social Hour    Thursday, April 19, 2018 7-8pm  [facility], [facility]  Exercise, nutrition and weight gain    Tuesday, June 28, 2018 7-8pm  [facility]  Postpartum depression/anxiety    Thursday August 23, 2018 7-8 pm  [facility], Auditorum A  Physiology of birth and breastfeeding, Pediatrician presentation    Thursday October 18,  2018  7-8pm,  [facility], Auditorium A  Social Hour    Please call [phone] to register        Touring the Maternity Care Center  To schedule a tour of [facility], call [phone]    To schedule a tour of [facility], call [phone]    Childbirth and Parenting Education:   [facility]: http://[facility] HospitalReelBox Media Entertainment/   [phone]  [place]: (education, yoga & wellness) www.Treventis  Enlightened Mama: www.enlightenedmama.OneID   Childbirth collective: (Parent topic nights)  www.childbirthcollective.org/  Hypnobabies:  www.hypnobabiestDigitalPost Interactiveties.com/  Hypnobirthing:  Http://hypnobirthing.com/    Book Recommendations:   Latoya Mandy's Birthing From Within--first few chapters include a new-age tone, you may prefer to skip it and keep going, because there is good stuff later.  This book recommendation covers emotional preparation, but does cover coping with pain, and use of both pharmacological and nonpharmacological methods.    Dr. Pena' The Pregnancy Book and The Birth Book--the pregnancy book goes month-by month    Womanly Art of Breastfeeding by La Leche [employer] International   Bestfeeding by Fariba Ramos--great pictures    Mothering Your Nursing Toddler, by Karine Medrano.   Addresses dealing with so many of the challenging behaviors of a nursing toddler.  How Weaning Happens, by La Leche League.  Discusses weaning at all ages, from medically necessary weaning of an infant, all the way up to age 5 (or older), with why/why not, and strategies.  Very empowering book both for deciding to wean and deciding not to.    American College of Nurse-Midwives (ACNM) http://www.midwife.org/; look at the informational handouts at http://www.midwife.org/Share-With-Women     www.mymidwife.org    Mother to Baby (Medication and Herbal guidance in pregnancy): http://www.mothertobaby.org  Toll-Free Hotline: 670.429.2047  LactMed (Medication use while breastfeeding):  "http://toxnet.nlm.nih.gov/newtoxnet/lactmed.htm    Women's Health.gov:  http://www.womenshealth.gov/a-z-topics/index.html    American pregnancy association - http://americanpregnancy.org    Centering Pregnancy (group prenatal care option): http://centeringhealthcare.org    Information about doulas:  Childbirth collective: http://www.childbirthcollective.org/  Doulas [employer] [place] (SCOTT):  www.scott.org  [facility]  project: http://twincitiesdoulaproject.com/     Early Childhood and Family Education (ECFE):  ECFE offers parents hands-on learning experiences that will nourish a lifetime of teachable moments.  http://ecfe.info/ecfe-home/     Dim www.Echobit     FDA - Nutrition  www.mypyramid.gov  Under \"For Consumers,\" click on \"pregnant and breastfeeding women.\"      Centers for Disease Control and Prevention (CDC) - Vaccines : http://www.cdc.gov/vaccines/       When researching information on the web, question the validity of websites.  The [place] .gov, .edu and.org tend to be more reliable information.  If there are a lot of advertisements, be cautious of the information provided. Stay away from blogs and chat rooms please!     [place] Screening Program  Http://www.health.[facility].mn.us/newbornscreening/  [place] newborns are tested soon after birth for more than 50 hidden, rare disorders, including hearing loss and critical congenital heart disease (CCHD). This site provides resources and information for families and providers.    HEALTHY PREGNANCY CARE: 37 to 41 WEEKS PREGNANT    Talk with your midwife or physician about when to call with signs of labor    Regular uterine contractions that are getting closer together and/or stronger    If you think your water has broken or is leaking    Bleeding from the vagina like a period (bloody vaginal discharge is normal)    If you are not feeling your baby move    Make plans for transportation and  as needed for when you are going to the " hospital.    Your midwife or physician may offer to check your cervix for changes.     Ask your health care provider about vaccinations you may need following delivery. By now, you should have received a Tdap immunization to protect against pertussis or whooping cough. Fathers and family members who will be in close contact with the baby should also receive a Tdap shot at least two weeks before the expected birth of the baby if they have not had a Td (tetanus) shot for at least two years.    If you are past your due date, discuss the next steps leading to delivery with your midwife or physician. If you don't start labor on your own by 41 or 42 weeks, your midwife or physician may recommend giving you medicines to ripen your cervix and start labor.  Induction of labor: http://onlinelibrary.bergman.com/store/10.1016/j.jmwh.2008.04.018/asset/j.jmwh.2008.04.018.pdf?v=1&t=mlrx3uer&k=[email or web address]    Preparing for your baby: Tell your midwife or physician how you plan to feed your baby (breast or bottle), who you have chosen to do pediatric care for your baby, and if you have a boy, whether you have chosen to have him circumcised. You will need a car seat correctly installed in your vehicle to bring your baby home. As you start to set up the nursery at home for your baby, make sure the crib is safe. The mattress needs to fit snugly against the edges of the crib. If you can fit a soda can between the bars, they are too far apart and can allow the baby's head to caught between them.    Learn about infant care and feeding, including information about infant CPR. We recommend that you put your baby to sleep on his or her back to reduce the chance of Sudden Infant Death Syndrome (SIDS). To maintain a healthy environment in which your child can grow, it's best to keep your home smoke-free. By preparing ahead, your transition into parenthood will go smoothly for you and your baby.    Your midwife or  physician will want to see you for a checkup 2 to 6 weeks after delivery.    If you have questions about any symptoms you are experiencing or any other concerns, call your provider or their clinic staff at [facility] at Phone: [phone]. If it's after clinic hours, physician patients should call the [facility] at [phone] ([phone]); midwife patients should call their answering service at [phone].    How can you care for yourself at home?   You can refer to the Starting Out Right book or find it online at http://www.healtheast.org/images/stories/maternity/[facility]-Starting-Out-Right.pdf or http://www.healtheast.org/images/stories/flipbooks/[facility]-starting-out-right/[facility]-starting-out-right.html#p=8     You can sign up for a weekly parenting e-mail that gives support, tips and advice from health care professionals that starts with pregnancy and continues through the toddler years. To register, go to www.[facility].org/baby at any time during your pregnancy.        Making Plans for Feeding My Baby    By this point, you probably have read a lot about feeding your baby.  Breastfeed or formula? Each mother s decision is her own and [facility] respects you and your choices. We ve gathered information on both breastfeeding and formula feeding to help with your decision. Talking with your physician or nurse-midwife can also help in your decision.  However you plan to feed your baby, [facility] encourage rooming in with your baby, skin-to-skin contact and feeding your baby based on his or her cues.    Skin-to-skin contact  Being close to mom helps your baby adjust to life outside of the womb.  It helps your baby regulate their body temperature, heart rate, and breathing.  Your baby will usually be placed skin-to-skin immediately following birth or as soon as possible, if medical intervention is needed.    Rooming-In  Having your baby stay with you in your room is  called  rooming-in .  Keeping your baby in your room helps you to learn how to care for your baby by getting to know your baby s cues, body rhythms and sleep cycle.       Cue-based feeding  Cues (signals) are baby s way of telling you what he or she wants.  When you learn your infant s cues, you know how to care for and feed your baby.   Feeding cues are the licking and smacking of lips, bringing their fist to their mouth, and a reflex called  rooting - where baby turns and opens his or her mouth, searching for the breast or bottle.  Crying is a late feeding cue.  Babies can feed frequently, often at least 8 times in 24 hours.    Breastfeeding facts  Breast milk is the best source of nutrition for your baby and is available at birth. In the first couple of days, your milk volume is already starting to increase, though it may not be noticeable. Breastfeed frequently to increase your milk supply. Within three to five days, you will begin to notice larger milk volumes. An increase in breast size, heaviness and firmness are often described as the milk  coming in.  Frequent breastfeeding can help breasts from getting overly firm and painful. You will know the baby is getting enough milk if your baby is having wet and dirty diapers and gaining weight.     If your goal is to exclusively breastfeed, it is important to not use any formula or artificial nipples (including bottles and pacifiers) while your baby is learning to breastfeed.  While it may seem like an  easy  option to give your baby a bottle, formula should only be given if there is a medical reason for your baby to have it.      Positioning and attachment   Get comfortable.  Use pillows as needed to support your arms and baby.  Hold baby close at the level of your breast, facing you in a tummy to tummy position.  Skin to skin helps with this.  Position the baby with his or her nose by the nipple.  There should be a straight line from baby s ear to shoulder to  hips.  Tickle your baby s lips or wait for baby to open mouth wide, bring baby to breast by leading with the chin.  Aim the nipple at the roof of baby s mouth.  A rapid sucking pattern is followed by longer, drawing pattern with occasional swallows heard.  When baby is correctly latched, your nipple and much of the areola are pulled well into baby s mouth.      Returning to work or school  Focus on a good start to breastfeeding.  Many women continue to provide breastmilk for their baby when they return to work or school.  Making plans about where to pump and store milk can make the transition go well.  Talk with other mothers who have also returned to work or school for tips and support.  Your employer s Human Resource department may be a resource as well.     Returning to work or school: (continued)     babies can mean fewer  sick  days for you.    A quality breast pump will also save time and add comfort.  Check with your insurance prior to giving birth for breast pump coverage.  Many insurance companies include a pump within your benefits.    Wait until your baby is at least three weeks old to introduce a bottle for the first time.  Have someone besides you give the bottle.    Breastfeed when you are with your baby. Reserve your bottles of breast milk for when you are away.     Your breasts will need to be  emptied  either by your baby or a pump.  Plan to pump at least twice in an eight hour day.    If you cannot pump at work, continue breastfeeding at home. Any amount of breast milk is worth giving to your baby.    Formula feeding facts  If you are planning to use formula to feed your baby, you will want to make some preparations ahead of time. Talk to your doctor or nurse-midwife about what type of formula to use. Some are iron-fortified, meaning they have extra iron in them. You will want to purchase formula and bottles before your baby is born to be sure you are ready after you return from the  hospital. The [facility] do not provide formula samples to take home.    Be sure to follow formula mixing directions closely. Regular milk in the dairy case at the grocery store should not be given to babies under 1 year old. Baby formula is sold in several forms including:    Ready-to-use. This is the most expensive, but no mixing is necessary.    Concentrated liquid. This is less expensive than ready-to-use and you mix with water.    Powder. This is the least expensive. You mix one level scoop of powdered formula with two ounces of water and stir well.    Most babies need 2.5 ounces of formula per pound of body weight each day. This means an 8-pound baby may drink about 20 ounces of formula a day; however, this is just an estimate. The most important thing is to pay attention to your baby s cues.  If your baby is always fussy, needs more iron or has certain food allergies, your physician may suggest you change your baby s formula to a different kind.   How do I warm my baby s bottles?  You may feed your baby a bottle without warming it first. It is OK for the breast milk or formula to be cool or room temperature. If your baby seems to prefer it warmed, you can put the filled bottle in a container of warm water and let it stand for a few minutes. Check the temperature of the liquid on your skin before feeding it to your baby; to be sure it isn t too hot. Do not heat bottles in the microwave. Microwaves heat food and liquids unevenly, and this can cause hot spots that can burn your baby.    How do I clean and sterilize bottles?  Sterilize bottles and nipples before you use them for the first time. You can do this by putting them in boiling water for 5 minutes. After that first time, you can wash them in hot and soapy water. Rinse them carefully to be sure there is no soap left on them. You can also wash them in the .    [facility] [phone] ([phone])

## 2021-06-23 NOTE — PROGRESS NOTES
Cayla Lerner presents with her spouse today. GBS testing today, discussed and answered questions. Hemoglobin also drawn today.  Fundal height remains consistent at 33, based on Leopold's fetus is vertex and fetal station is engaged in maternal pelvis, I do still recommend a growth ultrasound due to lack of growth in the past few weeks, patient is accepting of this and a referral was placed.  He reports that she is feeling well, reports occasional contractions, and normal fetal movement, no vaginal bleeding or change to vaginal discharge.  She reports that she is sleeping well overall.  Has noticed an increase in swelling in her hands and feet. Discussed postpartum birth control, patient is interested in the ParaGard and we discussed the benefits of this option.  Reviewed phone numbers, hospitals, and possibility of divert. Questions answered. Reviewed early versus active labor.   Encouraged weekly visits through [facility].   Warning s/sx reviewed with patient.  I recommended she call the [facility] on-call at [phone] in the case that she has greater than 5 contractions in one hour, leaking of watery fluid from her vagina, bright red vaginal bleeding, or feels her baby moving less or doesn't feel her baby moving normally.  I also recommend that she call with any severe headaches that are not alleviated with Tylenol, rest, or hydration, vision changes with change to swelling that seems significant, or with any epigastric or upper right-sided pain.

## 2021-06-23 NOTE — PROGRESS NOTES
Cayla BOYER Yann is here with spouse today. Surprised to learn she is 35 weeks pregnant! Feeling well, reports occasional pelvic pressure after a long day on her feet, improves with rest, no cramping or contractions, no change to vaginal discharge (improved since last visit), no bleeding. Reviewed PIH sx, denies. Reviewed fundal height, shared with Cayla consider growth US with next prenatal visit. Recommend GBS and hemoglobin in 1 week with SVE or BSUS to confirm vertex. EPDS 0, discussed  and postpartum mood changes vs disorders. Complete 32 week checklist at [place].   Warning s/sx reviewed with patient.  I recommended she call the CN on-call at [phone] in the case that she has greater than 5 contractions in one hour, leaking of watery fluid from her vagina, bright red vaginal bleeding, or feels her baby moving less or doesn't feel her baby moving normally.  I also recommend that she call with any severe headaches that are not alleviated with Tylenol, rest, or hydration, vision changes with change to swelling that seems significant, or with any epigastric or upper right-sided pain. RTC in 1 week.

## 2021-06-23 NOTE — PROGRESS NOTES
Cayla Lerner presents with Davon today.  He reports that she is doing well, notices active fetal movement, Roberto Carlos Cox contractions but no other contractions or cramping.  Denies any vaginal bleeding or leaking of fluid.  Denies any headaches or vision changes.  Patient is concerned about weight loss, fundal height is growing, reassurance provided.  Briefly discussed postdates management as patient is feeling nervous about this and strongly desires to go into labor prior to her due date.  We discussed ways to naturally induce labor.  Patient is wondering about cervical exams, we discussed that no evidence to support cervical exams in the absence of labor, may be important if discussing induction of labor, questions answered.  Reviewed CNM on-call number and when to call including for s/s of labor, srom, decreased fetal movement, bleeding, or other warning signs.

## 2021-06-23 NOTE — PATIENT INSTRUCTIONS - HE
[facility] Nurse Midwives - Contact information:  Appointment line and to get a hold of CNM in clinic Monday-Friday 8 am - 5 pm:  [phone].  There are some clinics with early start times (1st appointment 7:40 am) and others with evening hours (last appointment 6:20 pm).  Most are typically open from 8 am to 5 pm.    CNM on call answering service: [phone].  Specify your hospital of choice and leave a brief message for CNM;  will then page CNM who is on call at your specified hospital and you should receive a call back with 15 minutes.  Be sure that your ringer is audible and that you can accept blocked calls so that we can get back in touch with you! This number should be reserved for urgent needs if during the day, before 8 am, after 5 pm, weekends, holidays.    Contact the on-call CNM with warning signs, such as:    vaginal bleeding     Vaginal discharge and itching or pain and burning during urination    Leg/calf pain or swelling on one side    severe abdominal pain    nausea and vomiting more than 4-5 times a day, or if you are unable to keep anything down    fever more than 100.4 degrees F.          You are invited to  Meet the [facility] Nurse-Midwives  A way to tour the hospital Labor and Delivery unit and meet the midwives that attend births since you may not have the opportunity to meet them during your prenatal care.  Some sessions are informal meet and greet type social hours, others address a specific concern or topic.    Tuesday, Februrary 12, 2019 7-8pm  [facility]    Wednesday, April 17, 2019 7-8pm  [facility]    Thursday, August 15, 2019 7-8pm  [facility]    Wednesday November 13, 2019 7-8 pm  [facility]    Please call [phone] to register        Touring the Maternity Care Center  To schedule a tour of [facility], call [phone]    To schedule a tour  of [facility], call [phone]      Car Seat Clinics:  https://dps.[place].gov/divisions/ots/child-passenger-safety/Pages/car-seat-checks.aspx  [facility]    Free Car Seat Distribution Facilities   By Appt. Address Contact Information (For appointment)    \Yes [employer]\[address]\[place],\cell Nury Singh)\[email or web address]    Yes [facility]\ [address]\[place],\cell Crissnikolay Melchor)[phone]\[email or web address]    Yes [facility]\[address]\[place],\cell Mikki Llamas)[email or web address]      Immunizations:  http://www.cdc.gov/vaccines/schedules/easy-to-read/child.html      Postpartum Depression  The first weeks of caring for a  baby are more than a full-time job. Although it is often a happy time, your feelings and moods may not be what you expected. Many women experience  baby blues.  Here are some tips to help you understand when feelings of sadness are normal, and when you should call your health care provider.    What are the baby blues?  As many as 3 of every 4 women will have short periods of mood swings, crying, or feeling cranky or restless during the first weeks after birth. These feelings can be worse when you are tired or anxious. Women who have the baby blues often say they feel like crying but don t know why. Baby blues usually happen in the first or second week postpartum (after you give birth) and last less than a week. If you are not sleeping, becoming more upset, don t feel like you can take care of your baby, or your sadness lasts 2 weeks or more, call your health care provider.    What is postpartum depression?  About one in every 5 women will develop depression during the first few months postpartum that may be mild, moderate, or severe. Women who have postpartum depression may have some of these symptoms:    Feeling guilty     Not able to enjoy  your baby and feeling like you are not bonding with your baby      Not able to sleep, even when the baby is sleeping    Sleeping too much and feeling too tired to get out of bed    Feeling overwhelmed and not able to do what you need to during the day    Not able to concentrate    Don t feel like eating    Feeling like you are not normal or not yourself anymore    Not able to make decisions    Feeling like a failure as a mother    Feeling lonely or all alone    Thinking your baby might be better off without you  If you have any of these symptoms, call your health care provider!    Which symptoms of postpartum depression are dangerous?  Sometimes a woman with postpartum depression will have thoughts of harming herself or her baby. If you find yourself thinking about hurting yourself or your baby, call your health care provider immediately.    MOTHERHOOD: THE EARLY DAYS  You prepare for the birth of your baby for many months during pregnancy, and then the first months at home after your baby is born can be a quiet, gentle time of getting to know this new person who has come to live in your home. But for most women it is not all quiet or sweet. And for some women it is a very hard time.  What Can I Expect in the First Few Months After the Baby Comes?  New mothers and their families face many challenges in the first few months:    Your body and your hormones have to get back to normal.    You and the baby will be learning to breastfeed.    Babies only sleep a few hours at a time. The entire family will have a hard time getting enough sleep.    You and your family need to learn how to parent this new family member.    If you have a partner, you have to figure out how to stay together as a couple and maybe even start to have sex again.    You may have to figure out how to keep from getting pregnant again right away.    You may need to return to work and find day care.    How Long Will it Take for My Body to Get Back to  Normal?  Some changes will occur quickly. Others will not occur as quickly.    Your uterus, cervix, and vagina will all shrink to their nonpregnant size in about 2 weeks. Your vagina may be tender and dry for a few months--especially if you are breastfeeding.    If you had stitches or hemorrhoids, your   bottom   will be sore for 2 weeks or more.    For some women who have problems urinating, it can take several months for you to be able to hold your urine when you cough or sneeze or suddenly  something heavy.    Your breast milk will   come in   2 to 3 days after the birth of your baby. It will take 6 to 8 weeks for you and the baby to get the hang of breastfeeding and find a pattern. During these first weeks, you can have engorged breasts at times and often leak milk.    Your stomach and intestines all have to fall back into place. You may have a lot of gas for a few weeks.    You may be constipated--especially if you are breastfeeding.    Your stretched stomach muscles can recover in a few weeks, but for some women it takes longer--6 months or a year--to recover.    If you had a  delivery, you may have pain or numbness around the incision for 6 months or more.    Losing the weight you gained during pregnancy will probably take 6 months to a year. Have patience! It took 40 weeks to get here. Give yourself 40 weeks to get back.    What Can I Expect When My Hormones Change?  About 75% of all women will get the   blues.   This usually starts about 3 days after the birth of your baby. You may cry easily and feel very, very tired. A few women become very depressed. If you had a  delivery or your new baby was sick, you are at a higher risk for depression.  Call your health care provider right away if you cannot care for yourself or your baby, if you feel very nervous or worried, if you cannot stop crying, or if you are having thoughts of hurting yourself or your baby.    Taking Care of  Yourself  While you are still pregnant:    Talk with your partner and your family about the time ahead. Arrange for someone to help you during the first weeks at home if you can.    Talk with your health care provider about birth control options and make a plan before the baby comes.    If you are worried about how to parent a , take parenting classes. You will learn a lot about how babies act and you will make some friends who are going through the same thing at the same time. Most [facility] have these classes.    Arrange for someone to help with baby care if you can.  After the baby comes:    Ask for help. Let other people do the cooking and cleaning and run the house. Focus on yourself and your baby.    Sleep whenever you can. Try not to be tempted to   get some things done   when the baby sleeps. This is your time to sleep, too.    Drink lots of water. You will need at least 6 big glasses of water everyday to avoid constipation and make enough breast milk. Every time you sit down to breastfeed, have a big glass of water with you to drink while you are nursing.    Eat lots of vegetables and fruit. You will need lots of vitamins and fiber to help your body get back to normal. This will also help you avoid constipation.    Go outside and walk. Babies can go outside even if it is very cold. Fresh air and sunshine will do you both good.    Take sitz baths. Put about 6 inches of warm water in your bathtub and sit in there for 15 minutes 2 to 3 times a day. This will help your   bottom   heal more quickly. It will also give you 15 minutes of private time!    Talk to other mothers. Join a new parents group. Call [facility] and go to [facility] meetings if you are breastfeeding.     With your partner:    Keep talking. Share the experience.    Spend time alone. Even a 30-minute walk can be a date.    Start a birth control method. You can get pregnant before you even have a period. It is very important to use birth  control if you do not want to get pregnant again right away.    When you have sex, use a lubricant. A lot of lubricant! Take it slow.  The first few months after a baby comes can be a lot like floating in a jar of honey--very sweet and morrison, but very sticky, too. Take time to enjoy the good parts. Remind yourself that this time will pass. Bon voyage!    FOR MORE INFORMATION  For questions about depression during and after pregnancy:  http://www.womenshealth.gov/publications/our-publications/fact-sheet/depression-pregnancy.html   After birth: The first 6 weeks:  http://www.[employer]/Post-Birth-and-Recovery   Breastfeeding resources:  http://www.[employer].com.org/health-info/getting-breastfeeding-off-to-a-good-start/    HEALTHY PREGNANCY CARE: 37 to 41 WEEKS PREGNANT    Talk with your midwife or physician about when to call with signs of labor    Regular uterine contractions that are getting closer together and/or stronger    If you think your water has broken or is leaking    Bleeding from the vagina like a period (bloody vaginal discharge is normal)    If you are not feeling your baby move    Make plans for transportation and  as needed for when you are going to the hospital.    Your midwife or physician may offer to check your cervix for changes.     Ask your health care provider about vaccinations you may need following delivery. By now, you should have received a Tdap immunization to protect against pertussis or whooping cough. Fathers and family members who will be in close contact with the baby should also receive a Tdap shot at least two weeks before the expected birth of the baby if they have not had a Td (tetanus) shot for at least two years.    If you are past your due date, discuss the next steps leading to delivery with your midwife or physician. If you don't start labor on your own by 41 or 42 weeks, your midwife or physician may recommend giving you medicines to ripen your  cervix and start labor.    Preparing for your baby: Tell your midwife or physician how you plan to feed your baby (breast or bottle), who you have chosen to do pediatric care for your baby, and if you have a boy, whether you have chosen to have him circumcised. You will need a car seat correctly installed in your vehicle to bring your baby home. As you start to set up the nursery at home for your baby, make sure the crib is safe. The mattress needs to fit snugly against the edges of the crib. If you can fit a soda can between the bars, they are too far apart and can allow the baby's head to caught between them.    Learn about infant care and feeding, including information about infant CPR. We recommend that you put your baby to sleep on his or her back to reduce the chance of Sudden Infant Death Syndrome (SIDS). To maintain a healthy environment in which your child can grow, it's best to keep your home smoke-free. By preparing ahead, your transition into parenthood will go smoothly for you and your baby.    Your midwife or physician will want to see you for a checkup 2 to 6 weeks after delivery.    If you have questions about any symptoms you are experiencing or any other concerns, call your provider or their [facility] staff [facility] at Phone: [phone]. If it's after clinic hours, physician patients should call the [facility] at [phone] ([phone]); midwife patients should call their answering service at [phone].    How can you care for yourself at home?   You can refer to the Starting Out Right book or find it online at http://www.[facility].org/images/stories/maternity/[facility]-Starting-Out-Right.pdf or http://www.[facility].org/images/stories/flipbooks/[facility]-starting-out-right/[facility]-starting-out-right.html#p=8     You can sign up for a weekly parenting e-mail that gives support, tips and advice from health care professionals that starts with pregnancy and continues  through the toddler years. To register, go to [email or web address] at any time during your pregnancy.        Making Plans for Feeding My Baby    By this point, you probably have read a lot about feeding your baby.  Breastfeed or formula? Each mother s decision is her own and [facility] respects you and your choices. We ve gathered information on both breastfeeding and formula feeding to help with your decision. Talking with your physician or nurse-midwife can also help in your decision.  However you plan to feed your baby, [facility] encourage rooming in with your baby, skin-to-skin contact and feeding your baby based on his or her cues.    Skin-to-skin contact  Being close to mom helps your baby adjust to life outside of the womb.  It helps your baby regulate their body temperature, heart rate, and breathing.  Your baby will usually be placed skin-to-skin immediately following birth or as soon as possible, if medical intervention is needed.    Rooming-In  Having your baby stay with you in your room is called  rooming-in .  Keeping your baby in your room helps you to learn how to care for your baby by getting to know your baby s cues, body rhythms and sleep cycle.       Cue-based feeding  Cues (signals) are baby s way of telling you what he or she wants.  When you learn your infant s cues, you know how to care for and feed your baby.   Feeding cues are the licking and smacking of lips, bringing their fist to their mouth, and a reflex called  rooting - where baby turns and opens his or her mouth, searching for the breast or bottle.  Crying is a late feeding cue.  Babies can feed frequently, often at least 8 times in 24 hours.  Breastfeeding facts  Breast milk is the best source of nutrition for your baby and is available at birth. In the first couple of days, your milk volume is already starting to increase, though it may not be noticeable. Breastfeed frequently to increase your milk supply.  Within three to five days, you will begin to notice larger milk volumes. An increase in breast size, heaviness and firmness are often described as the milk  coming in.  Frequent breastfeeding can help breasts from getting overly firm and painful. You will know the baby is getting enough milk if your baby is having wet and dirty diapers and gaining weight.     If your goal is to exclusively breastfeed, it is important to not use any formula or artificial nipples (including bottles and pacifiers) while your baby is learning to breastfeed.  While it may seem like an  easy  option to give your baby a bottle, formula should only be given if there is a medical reason for your baby to have it.    Positioning and attachment   Get comfortable.  Use pillows as needed to support your arms and baby.  Hold baby close at the level of your breast, facing you in a tummy to tummy position.  Skin to skin helps with this.  Position the baby with his or her nose by the nipple.  There should be a straight line from baby s ear to shoulder to hips.  Tickle your baby s lips or wait for baby to open mouth wide, bring baby to breast by leading with the chin.  Aim the nipple at the roof of baby s mouth.  A rapid sucking pattern is followed by longer, drawing pattern with occasional swallows heard.  When baby is correctly latched, your nipple and much of the areola are pulled well into baby s mouth.      Returning to work or school  Focus on a good start to breastfeeding.  Many women continue to provide breastmilk for their baby when they return to work or school.  Making plans about where to pump and store milk can make the transition go well.  Talk with other mothers who have also returned to work or school for tips and support.  Your employer s Human Resource department may be a resource as well.     Returning to work or school: (continued)     babies can mean fewer  sick  days for you.    A quality breast pump will also save time  and add comfort.  Check with your insurance prior to giving birth for breast pump coverage.  Many insurance companies include a pump within your benefits.    Wait until your baby is at least three weeks old to introduce a bottle for the first time.  Have someone besides you give the bottle.    Breastfeed when you are with your baby. Reserve your bottles of breast milk for when you are away.     Your breasts will need to be  emptied  either by your baby or a pump.  Plan to pump at least twice in an eight hour day.    If you cannot pump at work, continue breastfeeding at home. Any amount of breast milk is worth giving to your baby.    Formula feeding facts  If you are planning to use formula to feed your baby, you will want to make some preparations ahead of time. Talk to your doctor or nurse-midwife about what type of formula to use. Some are iron-fortified, meaning they have extra iron in them. You will want to purchase formula and bottles before your baby is born to be sure you are ready after you return from the hospital. [facility] do not provide formula samples to take home.    Be sure to follow formula mixing directions closely. Regular milk in the dairy case at the grocery store should not be given to babies under 1 year old. Baby formula is sold in several forms including:    Ready-to-use. This is the most expensive, but no mixing is necessary.    Concentrated liquid. This is less expensive than ready-to-use and you mix with water.    Powder. This is the least expensive. You mix one level scoop of powdered formula with two ounces of water and stir well.    Most babies need 2.5 ounces of formula per pound of body weight each day. This means an 8-pound baby may drink about 20 ounces of formula a day; however, this is just an estimate. The most important thing is to pay attention to your baby s cues.  If your baby is always fussy, needs more iron or has certain food allergies, your physician may  suggest you change your baby s formula to a different kind.   How do I warm my baby s bottles?  You may feed your baby a bottle without warming it first. It is OK for the breast milk or formula to be cool or room temperature. If your baby seems to prefer it warmed, you can put the filled bottle in a container of warm water and let it stand for a few minutes. Check the temperature of the liquid on your skin before feeding it to your baby; to be sure it isn t too hot. Do not heat bottles in the microwave. Microwaves heat food and liquids unevenly, and this can cause hot spots that can burn your baby.    How do I clean and sterilize bottles?  Sterilize bottles and nipples before you use them for the first time. You can do this by putting them in boiling water for 5 minutes. After that first time, you can wash them in hot and soapy water. Rinse them carefully to be sure there is no soap left on them. You can also wash them in the .    [facility] [phone] ([phone])    Share with Women\Samm I in Labor?  What is labor? Labor is the work that your body does to birth your baby. Your uterus (the womb) contracts(tightens). The contractions(labor pains) push your baby down onto your cervix(the opening ofyour uterus). Thispressure causesyour cervix to open. When your cervix iscompletely open (10 centimeters dilated), you will push your baby through your vagina and out into the world.  What do contractions feel like? When contractionsfirst start, theyusually feellike cramps duringyour period. Sometimesyoufeelpain in your back. Mostoften,contractions feel like muscles pulling painfully in your lower belly. At first, the contractions will probably be 15 to 20 minutes apart.They maybe irregular and will not feel too painful. As labor goes on, the contractionsget stronger,closer together, more consistent, and more painful.  How do I time the contractions? When the contractionsseem to be coming regularly, youshould  start to time them.You time your contractions by counting the number of minutes from the start of one contraction to the start of the next contraction.  What should I do during early labor when the contractions start? If it is night andyoucan sleep, do so. If it happensduring the day, there are some things you can do to take care of yourself at home: Walk. If the painsyou are having are reallabor, walking will makethecontractionscome closer together and they will be stronger,but you will be able to cope with them better if you are standing or moving around. If the contractions are early labor ones that come andgo (sometimes called false labor), walkingcan make them go away. Take a shower or bath. This will help you relax. Eat. Labor is a big event.Your body needs a lot of energy to be effective.Eat whatever you feel like eating. Drink water. Not drinking enough water can cause contractions to not be as effectiveas theyshould be.You need to be well hydrated (drinking enoughwater) to help your body work well during labor. Take a na p. If youfeel tired, lay down on your side and get all the rest you can. It helps to be rested when you go intoactive labor. Do something you enjoy. Spend time with family. Watch a movie. Distraction will help you relax. Get a massage. If your labor is in your back, a strong massage on your lower back may feel very good. Getting a foot massage or having a partner rub your feet can also be very relaxing. Don t panic. You can do this. Your body was made for this. You are strong!  When should I call my health care provider if I think I am in labor? Your contractions have been 5 minutes or less apart for at least an hour. Your contractions are becoming so painful youcannot walk or talk during one. You think your amniotic sac (bag of garg) breaks. You may have a big gush of amniotic fluid (water) or just fluid that runs down your legs when you walk or move or change position.  Are there other  reasons to call my health care provider? If you are concerned about anything, don t hesitate to call your health care provider.You should definitely call your health care provider or go to the hospital if:  It is 3 weeks or more before your due date, and you are having contractions.  You have vaginal bleeding that is more than your period, soaks your underwear, or runs down your legs.  You have sudden severe pain that does not go away with rest.  Your baby has not movedfor several hours.  You are leaking greenish fluid.    For More Information: http://onlinelibrary.bergman.com/doi/10.1111/jmwh.73282/epdf     [place] Department of Health and Human Services: Signs oflabor,labor stages, and types of birth  http://womenshealth.gov/pregnancy/childbirth-beyond/labor-birth.html#a      Childbirth and Parenting Education:   [facility]: http://BrandBacker/   [phone]  [place]: (education, yoga & wellness) www.Cvergenx  Enlightened Mama: www.enlightenedmama.Zeer   Childbirth collective: (Parent topic nights)  www.childbirthcollective.org/  Hypnobabies:  www.hypnobabiestwincities.Zeer/  Hypnobirthing:  Http://hypnobirthing.com/    Book Recommendations:   Latoya Mandy's Birthing From Within--first few chapters include a new-age tone, you may prefer to skip it and keep going, because there is good stuff later.  This book recommendation covers emotional preparation, but does cover coping with pain, and use of both pharmacological and nonpharmacological methods.    Dr. Pena' The Pregnancy Book and The Birth Book--the pregnancy book goes month-by month    Womanly Art of Breastfeeding by [employer]   Bestfeeding by Fariba Ramos--great pictures    Mothering Your Nursing Toddler, by Karine Medrano.   Addresses dealing with so many of the challenging behaviors of a nursing toddler.  How Weaning Happens, by [employer].  Discusses weaning at all ages, from medically necessary weaning of  "an infant, all the way up to age 5 (or older), with why/why not, and strategies.  Very empowering book both for deciding to wean and deciding not to.    American College of Nurse-Midwives (ACNM) http://www.midwife.org/; look at the informational handouts at http://www.midwife.org/Share-With-Women     www.mymidwife.org    Mother to Baby (Medication and Herbal guidance in pregnancy): http://www.mothertobaby.org  Toll-Free Hotline: 832.525.3547  LactMed (Medication use while breastfeeding): http://toxnet.nlm.nih.gov/newtoxnet/lactmed.htm    Women's Health.gov:  http://www.womenshealth.gov/a-z-topics/index.html    American pregnancy association - http://americanpregnancy.org    Centering Pregnancy (group prenatal care option): http://centeringhealthcare.org    Information about doulas:  Childbirth collective: http://www.childbirthcollective.org/  [employer] [facility] [place] (SCOTT):  www.scott.org  [facility]  project: http://twincitiesdoulaproject.com/     Early Childhood and Family Education (ECFE):  ECFE offers parents hands-on learning experiences that will nourish a lifetime of teachable moments.  http://ecfe.info/ecfe-home/    March of Dimes www.Phraxis.Wooga     FDA - Nutrition  www.mypyramid.gov  Under \"For Consumers,\" click on \"pregnant and breastfeeding women.\"      Centers for Disease Control and Prevention (CDC) - Vaccines : http://www.cdc.gov/vaccines/       When researching information on the web, question the validity of websites.  The domains .gov, .edu and.org tend to be more reliable information.  If there are a lot of advertisements, be cautious of the information provided. Stay away from blogs and chat rooms please!     "

## 2021-06-23 NOTE — PATIENT INSTRUCTIONS - HE
[facility] Nurse Midwives - Contact information:  Appointment line and to get a hold of CNM in clinic Monday-Friday 8 am - 5 pm:  [phone].  There are some clinics with early start times (1st appointment 7:40 am) and others with evening hours (last appointment 6:20 pm).  Most are typically open from 8 am to 5 pm.    CNM on call answering service: [phone].  Specify your hospital of choice and leave a brief message for CNM;  will then page CNM who is on call at your specified hospital and you should receive a call back with 15 minutes.  Be sure that your ringer is audible and that you can accept blocked calls so that we can get back in touch with you! This number should be reserved for urgent needs if during the day, before 8 am, after 5 pm, weekends, holidays.    Contact the on-call CNM with warning signs, such as:    vaginal bleeding     Vaginal discharge and itching or pain and burning during urination    Leg/calf pain or swelling on one side    severe abdominal pain    nausea and vomiting more than 4-5 times a day, or if you are unable to keep anything down    fever more than 100.4 degrees F.          You are invited to  Meet the [facility] Nurse-Midwives  A way to tour the hospital Labor and Delivery unit and meet the midwives that attend births since you may not have the opportunity to meet them during your prenatal care.  Some sessions are informal meet and greet type social hours, others address a specific concern or topic.    Tuesday, Februrary 12, 2019 7-8pm  [facility]    Wednesday, April 17, 2019 7-8pm  [facility]    Thursday, August 15, 2019 7-8pm  [facility]    Wednesday November 13, 2019 7-8 pm  [facility]    Please call [phone] to register        Touring the Maternity Care Center  To schedule a tour of [facility], call [phone]    To schedule a tour  of [facility], call [phone]      Car Seat Clinics:  https://dps.mn.gov/divisions/ots/child-passenger-safety/Pages/car-seat-checks.aspx  Psychiatric    Free Car Seat Distribution Facilities   By Appt. Address Contact Information (For appointment)    \Yes [facility]\[address]\[place],\cell Nury Singh)\[email or web address]    Yes [facility]\ [address]\[place],\cell Crissnikolay Melchor)[email or web address]    Yes [facility]\[address]\[place],\cell Mikki Llamas)[phone]\[email or web address]      Immunizations:  http://www.cdc.gov/vaccines/schedules/easy-to-read/child.html      Postpartum Depression  The first weeks of caring for a  baby are more than a full-time job. Although it is often a happy time, your feelings and moods may not be what you expected. Many women experience  baby blues.  Here are some tips to help you understand when feelings of sadness are normal, and when you should call your health care provider.    What are the baby blues?  As many as 3 of every 4 women will have short periods of mood swings, crying, or feeling cranky or restless during the first weeks after birth. These feelings can be worse when you are tired or anxious. Women who have the baby blues often say they feel like crying but don t know why. Baby blues usually happen in the first or second week postpartum (after you give birth) and last less than a week. If you are not sleeping, becoming more upset, don t feel like you can take care of your baby, or your sadness lasts 2 weeks or more, call your health care provider.    What is postpartum depression?  About one in every 5 women will develop depression during the first few months postpartum that may be mild, moderate, or severe. Women who have postpartum depression may have some of these symptoms:    Feeling guilty     Not able to enjoy  your baby and feeling like you are not bonding with your baby      Not able to sleep, even when the baby is sleeping    Sleeping too much and feeling too tired to get out of bed    Feeling overwhelmed and not able to do what you need to during the day    Not able to concentrate    Don t feel like eating    Feeling like you are not normal or not yourself anymore    Not able to make decisions    Feeling like a failure as a mother    Feeling lonely or all alone    Thinking your baby might be better off without you  If you have any of these symptoms, call your health care provider!    Which symptoms of postpartum depression are dangerous?  Sometimes a woman with postpartum depression will have thoughts of harming herself or her baby. If you find yourself thinking about hurting yourself or your baby, call your health care provider immediately.    MOTHERHOOD: THE EARLY DAYS  You prepare for the birth of your baby for many months during pregnancy, and then the first months at home after your baby is born can be a quiet, gentle time of getting to know this new person who has come to live in your home. But for most women it is not all quiet or sweet. And for some women it is a very hard time.  What Can I Expect in the First Few Months After the Baby Comes?  New mothers and their families face many challenges in the first few months:    Your body and your hormones have to get back to normal.    You and the baby will be learning to breastfeed.    Babies only sleep a few hours at a time. The entire family will have a hard time getting enough sleep.    You and your family need to learn how to parent this new family member.    If you have a partner, you have to figure out how to stay together as a couple and maybe even start to have sex again.    You may have to figure out how to keep from getting pregnant again right away.    You may need to return to work and find day care.    How Long Will it Take for My Body to Get Back to  Normal?  Some changes will occur quickly. Others will not occur as quickly.    Your uterus, cervix, and vagina will all shrink to their nonpregnant size in about 2 weeks. Your vagina may be tender and dry for a few months--especially if you are breastfeeding.    If you had stitches or hemorrhoids, your   bottom   will be sore for 2 weeks or more.    For some women who have problems urinating, it can take several months for you to be able to hold your urine when you cough or sneeze or suddenly  something heavy.    Your breast milk will   come in   2 to 3 days after the birth of your baby. It will take 6 to 8 weeks for you and the baby to get the hang of breastfeeding and find a pattern. During these first weeks, you can have engorged breasts at times and often leak milk.    Your stomach and intestines all have to fall back into place. You may have a lot of gas for a few weeks.    You may be constipated--especially if you are breastfeeding.    Your stretched stomach muscles can recover in a few weeks, but for some women it takes longer--6 months or a year--to recover.    If you had a  delivery, you may have pain or numbness around the incision for 6 months or more.    Losing the weight you gained during pregnancy will probably take 6 months to a year. Have patience! It took 40 weeks to get here. Give yourself 40 weeks to get back.    What Can I Expect When My Hormones Change?  About 75% of all women will get the   blues.   This usually starts about 3 days after the birth of your baby. You may cry easily and feel very, very tired. A few women become very depressed. If you had a  delivery or your new baby was sick, you are at a higher risk for depression.  Call your health care provider right away if you cannot care for yourself or your baby, if you feel very nervous or worried, if you cannot stop crying, or if you are having thoughts of hurting yourself or your baby.    Taking Care of  Yourself  While you are still pregnant:    Talk with your partner and your family about the time ahead. Arrange for someone to help you during the first weeks at home if you can.    Talk with your health care provider about birth control options and make a plan before the baby comes.    If you are worried about how to parent a , take parenting classes. You will learn a lot about how babies act and you will make some friends who are going through the same thing at the same time. Most [facility] have these classes.    Arrange for someone to help with baby care if you can.  After the baby comes:    Ask for help. Let other people do the cooking and cleaning and run the house. Focus on yourself and your baby.    Sleep whenever you can. Try not to be tempted to   get some things done   when the baby sleeps. This is your time to sleep, too.    Drink lots of water. You will need at least 6 big glasses of water everyday to avoid constipation and make enough breast milk. Every time you sit down to breastfeed, have a big glass of water with you to drink while you are nursing.    Eat lots of vegetables and fruit. You will need lots of vitamins and fiber to help your body get back to normal. This will also help you avoid constipation.    Go outside and walk. Babies can go outside even if it is very cold. Fresh air and sunshine will do you both good.    Take sitz baths. Put about 6 inches of warm water in your bathtub and sit in there for 15 minutes 2 to 3 times a day. This will help your   bottom   heal more quickly. It will also give you 15 minutes of private time!    Talk to other mothers. Join a new parents group. Call Gino and go to [facility] meetings if you are breastfeeding.     With your partner:    Keep talking. Share the experience.    Spend time alone. Even a 30-minute walk can be a date.    Start a birth control method. You can get pregnant before you even have a period. It is very important to use birth  control if you do not want to get pregnant again right away.    When you have sex, use a lubricant. A lot of lubricant! Take it slow.  The first few months after a baby comes can be a lot like floating in a jar of honey--very sweet and morrison, but very sticky, too. Take time to enjoy the good parts. Remind yourself that this time will pass. Bon voyage!    FOR MORE INFORMATION  For questions about depression during and after pregnancy:  http://www.womenshealth.gov/publications/our-publications/fact-sheet/depression-pregnancy.html   After birth: The first 6 weeks:  http://www.Webbynode/Post-Birth-and-Recovery   Breastfeeding resources:  http://www.Photos I Like.org/health-info/getting-breastfeeding-off-to-a-good-start/    HEALTHY PREGNANCY CARE: 37 to 41 WEEKS PREGNANT    Talk with your midwife or physician about when to call with signs of labor    Regular uterine contractions that are getting closer together and/or stronger    If you think your water has broken or is leaking    Bleeding from the vagina like a period (bloody vaginal discharge is normal)    If you are not feeling your baby move    Make plans for transportation and  as needed for when you are going to the hospital.    Your midwife or physician may offer to check your cervix for changes.     Ask your health care provider about vaccinations you may need following delivery. By now, you should have received a Tdap immunization to protect against pertussis or whooping cough. Fathers and family members who will be in close contact with the baby should also receive a Tdap shot at least two weeks before the expected birth of the baby if they have not had a Td (tetanus) shot for at least two years.    If you are past your due date, discuss the next steps leading to delivery with your midwife or physician. If you don't start labor on your own by 41 or 42 weeks, your midwife or physician may recommend giving you medicines to ripen your  cervix and start labor.    Preparing for your baby: Tell your midwife or physician how you plan to feed your baby (breast or bottle), who you have chosen to do pediatric care for your baby, and if you have a boy, whether you have chosen to have him circumcised. You will need a car seat correctly installed in your vehicle to bring your baby home. As you start to set up the nursery at home for your baby, make sure the crib is safe. The mattress needs to fit snugly against the edges of the crib. If you can fit a soda can between the bars, they are too far apart and can allow the baby's head to caught between them.    Learn about infant care and feeding, including information about infant CPR. We recommend that you put your baby to sleep on his or her back to reduce the chance of Sudden Infant Death Syndrome (SIDS). To maintain a healthy environment in which your child can grow, it's best to keep your home smoke-free. By preparing ahead, your transition into parenthood will go smoothly for you and your baby.    Your midwife or physician will want to see you for a checkup 2 to 6 weeks after delivery.    If you have questions about any symptoms you are experiencing or any other concerns, call your provider or their clinic staff at [facility] at Phone: [phone]. If it's after clinic hours, physician patients should call the [facility] at [phone] ([phone]); midwife patients should call their answering service at [phone].    How can you care for yourself at home?   You can refer to the Starting Out Right book or find it online at http://www.[facility].org/images/stories/maternity/[facility]-Starting-Out-Right.pdf or http://www.[facility].org/images/stories/flipbooks/[facility]-starting-out-right/[facility]-starting-out-right.html#p=8     You can sign up for a weekly parenting e-mail that gives support, tips and advice from health care professionals that starts with pregnancy and continues  through the toddler years. To register, go to [email or web address] at any time during your pregnancy.        Making Plans for Feeding My Baby    By this point, you probably have read a lot about feeding your baby.  Breastfeed or formula? Each mother s decision is her own and [facility] respects you and your choices. We ve gathered information on both breastfeeding and formula feeding to help with your decision. Talking with your physician or nurse-midwife can also help in your decision.  However you plan to feed your baby, [facility] encourage rooming in with your baby, skin-to-skin contact and feeding your baby based on his or her cues.    Skin-to-skin contact  Being close to mom helps your baby adjust to life outside of the womb.  It helps your baby regulate their body temperature, heart rate, and breathing.  Your baby will usually be placed skin-to-skin immediately following birth or as soon as possible, if medical intervention is needed.    Rooming-In  Having your baby stay with you in your room is called  rooming-in .  Keeping your baby in your room helps you to learn how to care for your baby by getting to know your baby s cues, body rhythms and sleep cycle.       Cue-based feeding  Cues (signals) are baby s way of telling you what he or she wants.  When you learn your infant s cues, you know how to care for and feed your baby.   Feeding cues are the licking and smacking of lips, bringing their fist to their mouth, and a reflex called  rooting - where baby turns and opens his or her mouth, searching for the breast or bottle.  Crying is a late feeding cue.  Babies can feed frequently, often at least 8 times in 24 hours.  Breastfeeding facts  Breast milk is the best source of nutrition for your baby and is available at birth. In the first couple of days, your milk volume is already starting to increase, though it may not be noticeable. Breastfeed frequently to increase your milk supply.  Within three to five days, you will begin to notice larger milk volumes. An increase in breast size, heaviness and firmness are often described as the milk  coming in.  Frequent breastfeeding can help breasts from getting overly firm and painful. You will know the baby is getting enough milk if your baby is having wet and dirty diapers and gaining weight.     If your goal is to exclusively breastfeed, it is important to not use any formula or artificial nipples (including bottles and pacifiers) while your baby is learning to breastfeed.  While it may seem like an  easy  option to give your baby a bottle, formula should only be given if there is a medical reason for your baby to have it.    Positioning and attachment   Get comfortable.  Use pillows as needed to support your arms and baby.  Hold baby close at the level of your breast, facing you in a tummy to tummy position.  Skin to skin helps with this.  Position the baby with his or her nose by the nipple.  There should be a straight line from baby s ear to shoulder to hips.  Tickle your baby s lips or wait for baby to open mouth wide, bring baby to breast by leading with the chin.  Aim the nipple at the roof of baby s mouth.  A rapid sucking pattern is followed by longer, drawing pattern with occasional swallows heard.  When baby is correctly latched, your nipple and much of the areola are pulled well into baby s mouth.      Returning to work or school  Focus on a good start to breastfeeding.  Many women continue to provide breastmilk for their baby when they return to work or school.  Making plans about where to pump and store milk can make the transition go well.  Talk with other mothers who have also returned to work or school for tips and support.  Your employer s Human Resource department may be a resource as well.     Returning to work or school: (continued)     babies can mean fewer  sick  days for you.    A quality breast pump will also save time  and add comfort.  Check with your insurance prior to giving birth for breast pump coverage.  Many insurance companies include a pump within your benefits.    Wait until your baby is at least three weeks old to introduce a bottle for the first time.  Have someone besides you give the bottle.    Breastfeed when you are with your baby. Reserve your bottles of breast milk for when you are away.     Your breasts will need to be  emptied  either by your baby or a pump.  Plan to pump at least twice in an eight hour day.    If you cannot pump at work, continue breastfeeding at home. Any amount of breast milk is worth giving to your baby.    Formula feeding facts  If you are planning to use formula to feed your baby, you will want to make some preparations ahead of time. Talk to your doctor or nurse-midwife about what type of formula to use. Some are iron-fortified, meaning they have extra iron in them. You will want to purchase formula and bottles before your baby is born to be sure you are ready after you return from the hospital. The [facility] do not provide formula samples to take home.    Be sure to follow formula mixing directions closely. Regular milk in the dairy case at the grocery store should not be given to babies under 1 year old. Baby formula is sold in several forms including:    Ready-to-use. This is the most expensive, but no mixing is necessary.    Concentrated liquid. This is less expensive than ready-to-use and you mix with water.    Powder. This is the least expensive. You mix one level scoop of powdered formula with two ounces of water and stir well.    Most babies need 2.5 ounces of formula per pound of body weight each day. This means an 8-pound baby may drink about 20 ounces of formula a day; however, this is just an estimate. The most important thing is to pay attention to your baby s cues.  If your baby is always fussy, needs more iron or has certain food allergies, your physician may  suggest you change your baby s formula to a different kind.   How do I warm my baby s bottles?  You may feed your baby a bottle without warming it first. It is OK for the breast milk or formula to be cool or room temperature. If your baby seems to prefer it warmed, you can put the filled bottle in a container of warm water and let it stand for a few minutes. Check the temperature of the liquid on your skin before feeding it to your baby; to be sure it isn t too hot. Do not heat bottles in the microwave. Microwaves heat food and liquids unevenly, and this can cause hot spots that can burn your baby.    How do I clean and sterilize bottles?  Sterilize bottles and nipples before you use them for the first time. You can do this by putting them in boiling water for 5 minutes. After that first time, you can wash them in hot and soapy water. Rinse them carefully to be sure there is no soap left on them. You can also wash them in the .    [facility] [phone] ([phone])    Share with Women\Samm I in Labor?  What is labor? Labor is the work that your body does to birth your baby. Your uterus (the womb) contracts(tightens). The contractions(labor pains) push your baby down onto your cervix(the opening ofyour uterus). Thispressure causesyour cervix to open. When your cervix iscompletely open (10 centimeters dilated), you will push your baby through your vagina and out into the world.  What do contractions feel like? When contractionsfirst start, theyusually feellike cramps duringyour period. Sometimesyoufeelpain in your back. Mostoften,contractions feel like muscles pulling painfully in your lower belly. At first, the contractions will probably be 15 to 20 minutes apart.They maybe irregular and will not feel too painful. As labor goes on, the contractionsget stronger,closer together, more consistent, and more painful.  How do I time the contractions? When the contractionsseem to be coming regularly, youshould  start to time them.You time your contractions by counting the number of minutes from the start of one contraction to the start of the next contraction.  What should I do during early labor when the contractions start? If it is night andyoucan sleep, do so. If it happensduring the day, there are some things you can do to take care of yourself at home: Walk. If the painsyou are having are reallabor, walking will makethecontractionscome closer together and they will be stronger,but you will be able to cope with them better if you are standing or moving around. If the contractions are early labor ones that come andgo (sometimes called false labor), walkingcan make them go away. Take a shower or bath. This will help you relax. Eat. Labor is a big event.Your body needs a lot of energy to be effective.Eat whatever you feel like eating. Drink water. Not drinking enough water can cause contractions to not be as effectiveas theyshould be.You need to be well hydrated (drinking enoughwater) to help your body work well during labor. Take a na p. If youfeel tired, lay down on your side and get all the rest you can. It helps to be rested when you go intoactive labor. Do something you enjoy. Spend time with family. Watch a movie. Distraction will help you relax. Get a massage. If your labor is in your back, a strong massage on your lower back may feel very good. Getting a foot massage or having a partner rub your feet can also be very relaxing. Don t panic. You can do this. Your body was made for this. You are strong!  When should I call my health care provider if I think I am in labor? Your contractions have been 5 minutes or less apart for at least an hour. Your contractions are becoming so painful youcannot walk or talk during one. You think your amniotic sac (bag of garg) breaks. You may have a big gush of amniotic fluid (water) or just fluid that runs down your legs when you walk or move or change position.  Are there other  reasons to call my health care provider? If you are concerned about anything, don t hesitate to call your health care provider.You should definitely call your health care provider or go to the hospital if:  It is 3 weeks or more before your due date, and you are having contractions.  You have vaginal bleeding that is more than your period, soaks your underwear, or runs down your legs.  You have sudden severe pain that does not go away with rest.  Your baby has not movedfor several hours.  You are leaking greenish fluid.    For More Information: http://onlinelibrary.bergman.com/doi/10.1111/jmwh.84612/epdf     [place] Department of Health and Human Services: Signs oflabor,labor stages, and types of birth  http://womenshealth.gov/pregnancy/childbirth-beyond/labor-birth.html#a      Childbirth and Parenting Education:   [facility]: http://SocialPandas/   [phone]  [place]: (education, yoga & wellness) www.Qriket  Enlightened Mama: www.enlightenedmama.MicroJob   Childbirth collective: (Parent topic nights)  www.childbirthcollective.org/  Hypnobabies:  www.hypnobabiestwincities.MicroJob/  Hypnobirthing:  Http://hypnobirthing.com/    Book Recommendations:   Latoya Mandy's Birthing From Within--first few chapters include a new-age tone, you may prefer to skip it and keep going, because there is good stuff later.  This book recommendation covers emotional preparation, but does cover coping with pain, and use of both pharmacological and nonpharmacological methods.    Dr. Pena' The Pregnancy Book and The Birth Book--the pregnancy book goes month-by month    Womanly Art of Breastfeeding by La Leche League International   Bestfeeding by Fariba Ramos--great pictures    Mothering Your Nursing Toddler, by Karine Medrano.   Addresses dealing with so many of the challenging behaviors of a nursing toddler.  How Weaning Happens, by La Leche League.  Discusses weaning at all ages, from medically necessary weaning of  "an infant, all the way up to age 5 (or older), with why/why not, and strategies.  Very empowering book both for deciding to wean and deciding not to.    American College of Nurse-Midwives (ACNM) http://www.midwife.org/; look at the informational handouts at http://www.midwife.org/Share-With-Women     www.mymidwife.org    Mother to Baby (Medication and Herbal guidance in pregnancy): http://www.mothertobaby.org  Toll-Free Hotline: 791.402.1886  LactMed (Medication use while breastfeeding): http://toxnet.nlm.nih.gov/newtoxnet/lactmed.htm    Women's Health.gov:  http://www.womenshealth.gov/a-z-topics/index.html    American pregnancy association - http://americanpregnancy.org    Centering Pregnancy (group prenatal care option): http://centeringhealthcare.org    Information about doulas:  Childbirth collective: http://www.childbirthcollective.org/  [employer]oul[employer] [place] (SCOTT):  www.scott.org  [facility]  project: http://twincitiesdoulaproject.com/     Early Childhood and Family Education (ECFE):  ECFE offers parents hands-on learning experiences that will nourish a lifetime of teachable moments.  http://ecfe.info/ecfe-home/    March of [employer]imes www.ApprenNet.Commonplace [employer]ures     FDA - Nutrition  www.mypyramid.gov  Under \"For Consumers,\" click on \"pregnant and breastfeeding women.\"      Centers for Disease Control and Prevention (CDC) - Vaccines : http://www.cdc.gov/vaccines/       When researching information on the web, question the validity of websites.  The domains .gov, .edu and.org tend to be more reliable information.  If there are a lot of advertisements, be cautious of the information provided. Stay away from blogs and chat rooms please!     "

## 2021-06-23 NOTE — PATIENT INSTRUCTIONS - HE
[facility] Nurse Midwives - Contact information:  Appointment line and to get a hold of CNM in clinic Monday-Friday 8 am - 5 pm:  [phone].  There are some clinics with early start times (1st appointment 7:40 am) and others with evening hours (last appointment 6:20 pm).  Most are typically open from 8 am to 5 pm.    CNM on call answering service: [phone].  Specify your hospital of choice and leave a brief message for CNM;  will then page CNM who is on call at your specified hospital and you should receive a call back with 15 minutes.  Be sure that your ringer is audible and that you can accept blocked calls so that we can get back in touch with you! This number should be reserved for urgent needs if during the day, before 8 am, after 5 pm, weekends, holidays.    Contact the on-call CNM with warning signs, such as:    vaginal bleeding     Vaginal discharge and itching or pain and burning during urination    Leg/calf pain or swelling on one side    severe abdominal pain    nausea and vomiting more than 4-5 times a day, or if you are unable to keep anything down    fever more than 100.4 degrees F.         You are invited to  Meet the [facility] Nurse-Midwives  A way to tour the hospital Labor and Delivery unit and meet the midwives that attend births since you may not have the opportunity to meet them during your prenatal care.  Some sessions are informal meet and greet type social hours, others address a specific concern or topic.    Thursday, Februrary 22, 2018 7-8pm  [facility]  Social Hour    Thursday, April 19, 2018 7-8pm  [facility], [facility]  Exercise, nutrition and weight gain    Tuesday, June 28, 2018 7-8pm  [facility]  Postpartum depression/anxiety    Thursday August 23, 2018 7-8 pm  [facility], [facility]  Physiology of birth and breastfeeding, Pediatrician presentation    Thursday October 18,  2018  7-8pm,  [facility], Auditorium [facility]  Social Hour    Please call [phone] to register        Touring the Maternity Care Center  To schedule a tour of [facility], call [phone]    To schedule a tour of [facility], call [phone]    Childbirth and Parenting Education:   [facility]: http://[facility] HospitalOceanea/   [phone]  [place]: (education, yoga & wellness) www.Days of Wonder  Enlightened Mama: www.enlightenedmama.Wellpepper   Childbirth collective: (Parent topic nights)  www.childbirthcollective.org/  Hypnobabies:  www.hypnobabiestMetoooties.com/  Hypnobirthing:  Http://hypnobirthing.com/    Book Recommendations:   Latoya Mandy's Birthing From Within--first few chapters include a new-age tone, you may prefer to skip it and keep going, because there is good stuff later.  This book recommendation covers emotional preparation, but does cover coping with pain, and use of both pharmacological and nonpharmacological methods.    Dr. Pena' The Pregnancy Book and The Birth Book--the pregnancy book goes month-by month    Womanly Art of Breastfeeding by La Leche League International   Bestfeeding by Fariba Ramos--great pictures    Mothering Your Nursing Toddler, by Karine Medrano.   Addresses dealing with so many of the challenging behaviors of a nursing toddler.  How Weaning Happens, by La Leche League.  Discusses weaning at all ages, from medically necessary weaning of an infant, all the way up to age 5 (or older), with why/why not, and strategies.  Very empowering book both for deciding to wean and deciding not to.    American College of Nurse-Midwives (ACNM) http://www.midwife.org/; look at the informational handouts at http://www.midwife.org/Share-With-Women     www.mymidwife.org    Mother to Baby (Medication and Herbal guidance in pregnancy): http://www.mothertobaby.org  Toll-Free Hotline: 832.460.7920  LactMed (Medication use while breastfeeding):  "http://toxnet.nlm.nih.gov/newtoxnet/lactmed.htm    Women's Health.gov:  http://www.womenshealth.gov/a-z-topics/index.html    American pregnancy association - http://americanpregnancy.org    Centering Pregnancy (group prenatal care option): http://centeringhealthcare.org    Information about doulas:  Childbirth collective: http://www.childbirthcollective.org/  [employer]oul[employer] [place] (SCOTT):  www.scott.org  [facility]  project: http://twincitiesdoulaproject.com/     Early Childhood and Family Education (ECFE):  ECFE offers parents hands-on learning experiences that will nourish a lifetime of teachable moments.  http://ecfe.info/ecfe-home/     Dim www.Rage Frameworks     FDA - Nutrition  www.mypyramid.gov  Under \"For Consumers,\" click on \"pregnant and breastfeeding women.\"      Centers for Disease Control and Prevention (CDC) - Vaccines : http://www.cdc.gov/vaccines/       When researching information on the web, question the validity of websites.  The [employer] .gov, .edu and.org tend to be more reliable information.  If there are a lot of advertisements, be cautious of the information provided. Stay away from blogs and chat rooms please!     [place] Screening Program  Http://www.health.[facility].mn.us/newbornscreening/  [place] newborns are tested soon after birth for more than 50 hidden, rare disorders, including hearing loss and critical congenital heart disease (CCHD). This site provides resources and information for families and providers.    HEALTHY PREGNANCY CARE: 37 to 41 WEEKS PREGNANT    Talk with your midwife or physician about when to call with signs of labor    Regular uterine contractions that are getting closer together and/or stronger    If you think your water has broken or is leaking    Bleeding from the vagina like a period (bloody vaginal discharge is normal)    If you are not feeling your baby move    Make plans for transportation and  as needed for when you are going to the " hospital.    Your midwife or physician may offer to check your cervix for changes.     Ask your health care provider about vaccinations you may need following delivery. By now, you should have received a Tdap immunization to protect against pertussis or whooping cough. Fathers and family members who will be in close contact with the baby should also receive a Tdap shot at least two weeks before the expected birth of the baby if they have not had a Td (tetanus) shot for at least two years.    If you are past your due date, discuss the next steps leading to delivery with your midwife or physician. If you don't start labor on your own by 41 or 42 weeks, your midwife or physician may recommend giving you medicines to ripen your cervix and start labor.  Induction of labor: http://onlinelibrary.bergman.com/store/10.1016/j.jmwh.2008.04.018/asset/j.jmwh.2008.04.018.pdf?v=1&t=moga8khe&u=[id number]    Preparing for your baby: Tell your midwife or physician how you plan to feed your baby (breast or bottle), who you have chosen to do pediatric care for your baby, and if you have a boy, whether you have chosen to have him circumcised. You will need a car seat correctly installed in your vehicle to bring your baby home. As you start to set up the nursery at home for your baby, make sure the crib is safe. The mattress needs to fit snugly against the edges of the crib. If you can fit a soda can between the bars, they are too far apart and can allow the baby's head to caught between them.    Learn about infant care and feeding, including information about infant CPR. We recommend that you put your baby to sleep on his or her back to reduce the chance of Sudden Infant Death Syndrome (SIDS). To maintain a healthy environment in which your child can grow, it's best to keep your home smoke-free. By preparing ahead, your transition into parenthood will go smoothly for you and your baby.    Your midwife or  physician will want to see you for a checkup 2 to 6 weeks after delivery.    If you have questions about any symptoms you are experiencing or any other concerns, call your provider or their clinic staff at [facility] at Phone: [phone]. If it's after clinic hours, physician patients should call the [facility] at [phone]); midwife patients should call their answering service at [phone].    How can you care for yourself at home?   You can refer to the Starting Out Right book or find it online at http://www.[facility]althe[facility].org/images/stories/maternity/[facility]-Starting-Out-Right.pdf or http://www.healtheast.org/images/stories/flipbooks/[facility]-starting-out-right/[facility]-starting-out-right.html#p=8     You can sign up for a weekly parenting e-mail that gives support, tips and advice from health care professionals that starts with pregnancy and continues through the toddler years. To register, go to www.[facility].org/baby at any time during your pregnancy.        Making Plans for Feeding My Baby    By this point, you probably have read a lot about feeding your baby.  Breastfeed or formula? Each mother s decision is her own and [facility] respects you and your choices. We ve gathered information on both breastfeeding and formula feeding to help with your decision. Talking with your physician or nurse-midwife can also help in your decision.  However you plan to feed your baby, [facility] encourage rooming in with your baby, skin-to-skin contact and feeding your baby based on his or her cues.    Skin-to-skin contact  Being close to mom helps your baby adjust to life outside of the womb.  It helps your baby regulate their body temperature, heart rate, and breathing.  Your baby will usually be placed skin-to-skin immediately following birth or as soon as possible, if medical intervention is needed.    Rooming-In  Having your baby stay with you in your room is  called  rooming-in .  Keeping your baby in your room helps you to learn how to care for your baby by getting to know your baby s cues, body rhythms and sleep cycle.       Cue-based feeding  Cues (signals) are baby s way of telling you what he or she wants.  When you learn your infant s cues, you know how to care for and feed your baby.   Feeding cues are the licking and smacking of lips, bringing their fist to their mouth, and a reflex called  rooting - where baby turns and opens his or her mouth, searching for the breast or bottle.  Crying is a late feeding cue.  Babies can feed frequently, often at least 8 times in 24 hours.    Breastfeeding facts  Breast milk is the best source of nutrition for your baby and is available at birth. In the first couple of days, your milk volume is already starting to increase, though it may not be noticeable. Breastfeed frequently to increase your milk supply. Within three to five days, you will begin to notice larger milk volumes. An increase in breast size, heaviness and firmness are often described as the milk  coming in.  Frequent breastfeeding can help breasts from getting overly firm and painful. You will know the baby is getting enough milk if your baby is having wet and dirty diapers and gaining weight.     If your goal is to exclusively breastfeed, it is important to not use any formula or artificial nipples (including bottles and pacifiers) while your baby is learning to breastfeed.  While it may seem like an  easy  option to give your baby a bottle, formula should only be given if there is a medical reason for your baby to have it.      Positioning and attachment   Get comfortable.  Use pillows as needed to support your arms and baby.  Hold baby close at the level of your breast, facing you in a tummy to tummy position.  Skin to skin helps with this.  Position the baby with his or her nose by the nipple.  There should be a straight line from baby s ear to shoulder to  hips.  Tickle your baby s lips or wait for baby to open mouth wide, bring baby to breast by leading with the chin.  Aim the nipple at the roof of baby s mouth.  A rapid sucking pattern is followed by longer, drawing pattern with occasional swallows heard.  When baby is correctly latched, your nipple and much of the areola are pulled well into baby s mouth.      Returning to work or school  Focus on a good start to breastfeeding.  Many women continue to provide breastmilk for their baby when they return to work or school.  Making plans about where to pump and store milk can make the transition go well.  Talk with other mothers who have also returned to work or school for tips and support.  Your employer s Human Resource department may be a resource as well.     Returning to work or school: (continued)     babies can mean fewer  sick  days for you.    A quality breast pump will also save time and add comfort.  Check with your insurance prior to giving birth for breast pump coverage.  Many insurance companies include a pump within your benefits.    Wait until your baby is at least three weeks old to introduce a bottle for the first time.  Have someone besides you give the bottle.    Breastfeed when you are with your baby. Reserve your bottles of breast milk for when you are away.     Your breasts will need to be  emptied  either by your baby or a pump.  Plan to pump at least twice in an eight hour day.    If you cannot pump at work, continue breastfeeding at home. Any amount of breast milk is worth giving to your baby.    Formula feeding facts  If you are planning to use formula to feed your baby, you will want to make some preparations ahead of time. Talk to your doctor or nurse-midwife about what type of formula to use. Some are iron-fortified, meaning they have extra iron in them. You will want to purchase formula and bottles before your baby is born to be sure you are ready after you return from the  hospital. The [facility] do not provide formula samples to take home.    Be sure to follow formula mixing directions closely. Regular milk in the dairy case at the grocery store should not be given to babies under 1 year old. Baby formula is sold in several forms including:    Ready-to-use. This is the most expensive, but no mixing is necessary.    Concentrated liquid. This is less expensive than ready-to-use and you mix with water.    Powder. This is the least expensive. You mix one level scoop of powdered formula with two ounces of water and stir well.    Most babies need 2.5 ounces of formula per pound of body weight each day. This means an 8-pound baby may drink about 20 ounces of formula a day; however, this is just an estimate. The most important thing is to pay attention to your baby s cues.  If your baby is always fussy, needs more iron or has certain food allergies, your physician may suggest you change your baby s formula to a different kind.   How do I warm my baby s bottles?  You may feed your baby a bottle without warming it first. It is OK for the breast milk or formula to be cool or room temperature. If your baby seems to prefer it warmed, you can put the filled bottle in a container of warm water and let it stand for a few minutes. Check the temperature of the liquid on your skin before feeding it to your baby; to be sure it isn t too hot. Do not heat bottles in the microwave. Microwaves heat food and liquids unevenly, and this can cause hot spots that can burn your baby.    How do I clean and sterilize bottles?  Sterilize bottles and nipples before you use them for the first time. You can do this by putting them in boiling water for 5 minutes. After that first time, you can wash them in hot and soapy water. Rinse them carefully to be sure there is no soap left on them. You can also wash them in the .    [facility] [phone] ([phone])

## 2021-06-24 NOTE — PATIENT INSTRUCTIONS - HE
Patient Education   HEALTHY PREGNANCY CARE: 37 to 41 WEEKS PREGNANT    Talk with your midwife or physician about when to call with signs of labor    Regular uterine contractions that are getting closer together and/or stronger    If you think your water has broken or is leaking    Bleeding from the vagina like a period (bloody vaginal discharge is normal)    If you are not feeling your baby move    Make plans for transportation and  as needed for when you are going to the hospital.    Your midwife or physician may offer to check your cervix for changes.     Ask your health care provider about vaccinations you may need following delivery. By now, you should have received a Tdap immunization to protect against pertussis or whooping cough. Fathers and family members who will be in close contact with the baby should also receive a Tdap shot at least two weeks before the expected birth of the baby if they have not had a Td (tetanus) shot for at least two years.    If you are past your due date, discuss the next steps leading to delivery with your midwife or physician. If you don't start labor on your own by 41 or 42 weeks, your midwife or physician may recommend giving you medicines to ripen your cervix and start labor.    Preparing for your baby: Tell your midwife or physician how you plan to feed your baby (breast or bottle), who you have chosen to do pediatric care for your baby, and if you have a boy, whether you have chosen to have him circumcised. You will need a car seat correctly installed in your vehicle to bring your baby home. As you start to set up the nursery at home for your baby, make sure the crib is safe. The mattress needs to fit snugly against the edges of the crib. If you can fit a soda can between the bars, they are too far apart and can allow the baby's head to caught between them.    Learn about infant care and feeding, including information about infant CPR. We recommend that you put  your baby to sleep on his or her back to reduce the chance of Sudden Infant Death Syndrome (SIDS). To maintain a healthy environment in which your child can grow, it's best to keep your home smoke-free. By preparing ahead, your transition into parenthood will go smoothly for you and your baby.    Your midwife or physician will want to see you for a checkup 2 to 6 weeks after delivery.    If you have questions about any symptoms you are experiencing or any other concerns, call your provider or their clinic staff at [facility] at Phone: [phone]. If it's after clinic hours, physician patients should call the [facility] at [phone]); midwife patients should call their answering service at [phone].    How can you care for yourself at home?   You can refer to the Starting Out Right book or find it online at [email or web address] or [email or web address]     You can sign up for a weekly parenting e-mail that gives support, tips and advice from health care professionals that starts with pregnancy and continues through the toddler years. To register, go to [email or web address] at any time during your pregnancy.        Making Plans for Feeding My Baby    By this point, you probably have read a lot about feeding your baby.  Breastfeed or formula? Each mother s decision is her own and [facility] respects you and your choices. We ve gathered information on both breastfeeding and formula feeding to help with your decision. Talking with your physician or nurse-midwife can also help in your decision.  However you plan to feed your baby, [facility] encourage rooming in with your baby, skin-to-skin contact and feeding your baby based on his or her cues.    Skin-to-skin contact  Being close to mom helps your  baby adjust to life outside of the womb.  It helps your baby regulate their body temperature, heart rate, and breathing.  Your baby will usually be placed skin-to-skin immediately following birth or as soon as possible, if medical intervention is needed.    Rooming-In  Having your baby stay with you in your room is called  rooming-in .  Keeping your baby in your room helps you to learn how to care for your baby by getting to know your baby s cues, body rhythms and sleep cycle.       Cue-based feeding  Cues (signals) are baby s way of telling you what he or she wants.  When you learn your infant s cues, you know how to care for and feed your baby.   Feeding cues are the licking and smacking of lips, bringing their fist to their mouth, and a reflex called  rooting - where baby turns and opens his or her mouth, searching for the breast or bottle.  Crying is a late feeding cue.  Babies can feed frequently, often at least 8 times in 24 hours.  Breastfeeding facts  Breast milk is the best source of nutrition for your baby and is available at birth. In the first couple of days, your milk volume is already starting to increase, though it may not be noticeable. Breastfeed frequently to increase your milk supply. Within three to five days, you will begin to notice larger milk volumes. An increase in breast size, heaviness and firmness are often described as the milk  coming in.  Frequent breastfeeding can help breasts from getting overly firm and painful. You will know the baby is getting enough milk if your baby is having wet and dirty diapers and gaining weight.     If your goal is to exclusively breastfeed, it is important to not use any formula or artificial nipples (including bottles and pacifiers) while your baby is learning to breastfeed.  While it may seem like an  easy  option to give your baby a bottle, formula should only be given if there is a medical reason for your baby to have it.    Positioning and attachment    Get comfortable.  Use pillows as needed to support your arms and baby.  Hold baby close at the level of your breast, facing you in a tummy to tummy position.  Skin to skin helps with this.  Position the baby with his or her nose by the nipple.  There should be a straight line from baby s ear to shoulder to hips.  Tickle your baby s lips or wait for baby to open mouth wide, bring baby to breast by leading with the chin.  Aim the nipple at the roof of baby s mouth.  A rapid sucking pattern is followed by longer, drawing pattern with occasional swallows heard.  When baby is correctly latched, your nipple and much of the areola are pulled well into baby s mouth.      Returning to work or school  Focus on a good start to breastfeeding.  Many women continue to provide breastmilk for their baby when they return to work or school.  Making plans about where to pump and store milk can make the transition go well.  Talk with other mothers who have also returned to work or school for tips and support.  Your employer s Human Resource department may be a resource as well.     Returning to work or school: (continued)     babies can mean fewer  sick  days for you.    A quality breast pump will also save time and add comfort.  Check with your insurance prior to giving birth for breast pump coverage.  Many insurance companies include a pump within your benefits.    Wait until your baby is at least three weeks old to introduce a bottle for the first time.  Have someone besides you give the bottle.    Breastfeed when you are with your baby. Reserve your bottles of breast milk for when you are away.     Your breasts will need to be  emptied  either by your baby or a pump.  Plan to pump at least twice in an eight hour day.    If you cannot pump at work, continue breastfeeding at home. Any amount of breast milk is worth giving to your baby.    Formula feeding facts  If you are planning to use formula to feed your baby, you will  want to make some preparations ahead of time. Talk to your doctor or nurse-midwife about what type of formula to use. Some are iron-fortified, meaning they have extra iron in them. You will want to purchase formula and bottles before your baby is born to be sure you are ready after you return from the hospital. The [facility] do not provide formula samples to take home.    Be sure to follow formula mixing directions closely. Regular milk in the dairy case at the grocery store should not be given to babies under 1 year old. Baby formula is sold in several forms including:    Ready-to-use. This is the most expensive, but no mixing is necessary.    Concentrated liquid. This is less expensive than ready-to-use and you mix with water.    Powder. This is the least expensive. You mix one level scoop of powdered formula with two ounces of water and stir well.    Most babies need 2.5 ounces of formula per pound of body weight each day. This means an 8-pound baby may drink about 20 ounces of formula a day; however, this is just an estimate. The most important thing is to pay attention to your baby s cues.  If your baby is always fussy, needs more iron or has certain food allergies, your physician may suggest you change your baby s formula to a different kind.   How do I warm my baby s bottles?  You may feed your baby a bottle without warming it first. It is OK for the breast milk or formula to be cool or room temperature. If your baby seems to prefer it warmed, you can put the filled bottle in a container of warm water and let it stand for a few minutes. Check the temperature of the liquid on your skin before feeding it to your baby; to be sure it isn t too hot. Do not heat bottles in the microwave. Microwaves heat food and liquids unevenly, and this can cause hot spots that can burn your baby.    How do I clean and sterilize bottles?  Sterilize bottles and nipples before you use them for the first time. You can do  this by putting them in boiling water for 5 minutes. After that first time, you can wash them in hot and soapy water. Rinse them carefully to be sure there is no soap left on them. You can also wash them in the .    [facility] [phone] ([phone])

## 2021-06-24 NOTE — PROGRESS NOTES
PP Blood Pressure check    S: Pt presents to [facility] clinic for a PP BP check.  19. Gestational hypertension, preeclampsia (without severe features).  Patient presents with her mother and baby Florina.  She states that everything has been going very well.  Baby sleeps quite well for 3-4 hours stretches at night.  Baby is nursing well.  Patient has had no issues with urination, bowel movements are fine, bleeding is lessening.  Patient is quite tired but she says seems normal.  No signs or symptoms of preeclampsia including no headache, no nausea or vomiting, no epigastric pain, no visual changes, no headache, no swelling.    O: Blood pressure 120/80.    A: 9 days PP--BP check    P: #1.  Blood pressure check is normal.  Patient knows to continue to watch for signs and symptoms of preeclampsia.  Reviewed.  2.  Return to clinic at 6 weeks postpartum for normal postpartum visit.  3.  Written information given about ParaGard and Mirena IUDs.  Patient will choose between these and get more information and consent at her postpartum visit.  4.  Prescription given for breast pump.      Total time with patient 15 minutes Greater than 50% of time spent with patient was counseling, education and coordination of care.  Ning LUTZ CNM

## 2021-06-24 NOTE — PROGRESS NOTES
Cayla presents to the clinic today with her  Davon.  Discussed pregnancy weight gain at 44 pounds which is very slightly above the 28-40 pound total weight gain recommendation.  Eager for baby's arrival!  GBS NEGATIVE.  Patient requesting sweeping of membranes.  Benefits and risks reviewed, and patient tolerated well with a small amount of bloody show on examiner's glove.  Baby is active, G denies regular uterine contractions, loss of fluid or vaginal bleeding.  Term labor precautions and danger signs and symptoms reviewed.  All questions answered.  Encouraged daily fetal movement counting and to call or return to clinic with any questions, concerns, or as needed.

## 2021-07-04 NOTE — PROGRESS NOTES
Progress Notes by Africa Castanon at 6/18/2021  8:40 AM     Author: Africa Castanon Service: -- Author Type: Medical Student    Filed: 6/18/2021  9:50 AM Encounter Date: 6/18/2021 Status: Attested    : Africa Castanon (Medical Student) Cosigner: Aimee Izquierdo APRN, CNM at 6/18/2021  9:50 AM    Attestation signed by Aimee Izquierdo APRN, CNM at 6/18/2021  9:50 AM    Present for and agree with exam and assessment.  NELDA Levine CNM                  Cayla is here with spouse Davon today for her prenatal visit. She reports doing well and feeling lots of fetal movement. GCT, Hgb, RPR and Liver enzymes checked today. Discussed preregistering with the hospital, Fe supplementation, on call CNM numbers and remaining course of care. Cayla denies NORWOOD, LOF, regular or painful contractions or RUQ pain. She is accepting of Tdap today.     Patient was seen with student, NAHUN Griffin who was present for learning. I personally assessed, examined and made clinical decisions reflected in the documentation.   NELDA Christian CNM

## 2021-07-06 ENCOUNTER — DOCUMENTATION ONLY (OUTPATIENT)
Dept: ADMINISTRATIVE | Facility: OTHER | Age: 31
End: 2021-07-06

## 2021-07-06 VITALS
HEART RATE: 76 BPM | BODY MASS INDEX: 22.66 KG/M2 | SYSTOLIC BLOOD PRESSURE: 108 MMHG | WEIGHT: 141 LBS | DIASTOLIC BLOOD PRESSURE: 66 MMHG | HEIGHT: 66 IN

## 2021-07-07 NOTE — PROGRESS NOTES
This encounter was created as part of manual pregnancy episode data conversion for the single EHR project. The following information (where applicable) was manually abstracted from the [facility] Epic instance on July 6, 2021: pregnancy episode name/date, dating, episode encounter linking, pregravid weight, number of fetuses, and pregnancy overview and plan.     Jeri Bee   Clinical Informatics

## 2021-07-12 ENCOUNTER — PRENATAL OFFICE VISIT (OUTPATIENT)
Dept: MIDWIFE SERVICES | Facility: CLINIC | Age: 31
End: 2021-07-12
Payer: COMMERCIAL

## 2021-07-12 VITALS
HEIGHT: 66 IN | WEIGHT: 150 LBS | SYSTOLIC BLOOD PRESSURE: 110 MMHG | HEART RATE: 80 BPM | DIASTOLIC BLOOD PRESSURE: 62 MMHG | BODY MASS INDEX: 24.11 KG/M2

## 2021-07-12 DIAGNOSIS — O35.DXX0 ECHOGENIC FOCUS OF BOWEL OF FETUS AFFECTING ANTEPARTUM CARE OF MOTHER, SINGLE OR UNSPECIFIED FETUS: ICD-10-CM

## 2021-07-12 DIAGNOSIS — Z34.80 SUPERVISION OF OTHER NORMAL PREGNANCY, ANTEPARTUM: ICD-10-CM

## 2021-07-12 DIAGNOSIS — R89.9 ABNORMAL LABORATORY TEST RESULT: ICD-10-CM

## 2021-07-12 DIAGNOSIS — O09.299 HISTORY OF PRE-ECLAMPSIA IN PRIOR PREGNANCY, CURRENTLY PREGNANT: ICD-10-CM

## 2021-07-12 PROCEDURE — 99207 PR PRENATAL VISIT: CPT | Performed by: ADVANCED PRACTICE MIDWIFE

## 2021-07-12 RX ORDER — ASPIRIN 81 MG/1
81 TABLET, CHEWABLE ORAL
Status: ON HOLD | COMMUNITY
Start: 2021-03-01 | End: 2021-09-10

## 2021-07-12 ASSESSMENT — MIFFLIN-ST. JEOR: SCORE: 1412.15

## 2021-07-12 NOTE — PATIENT INSTRUCTIONS
"[facility] Nurse [facility]  Contact information:  Appointment line and to get a hold of CNM in clinic Monday-Friday 8 am - 5 pm:  [phone].  There are some clinics with early start times (1st appointment 7:40 am) and others with evening hours (last appointment 6:20 pm).  Most are typically open from 8 am to 5 pm.    CNM on call answering service: [phone].  Specify your hospital of choice and leave a brief message for CNM;  will then page CNM who is on call at your specified hospital and you should receive a call back with 15 minutes.  Be sure that your ringer is audible and that you can accept blocked calls so that we can get back in touch with you! This number should be reserved for urgent needs if during the day, before 8 am, after 5 pm, weekends, holidays.    Contact the on-call CNM with warning signs, such as:    vaginal bleeding     Vaginal discharge and itching or pain and burning during urination    Leg/calf pain or swelling on one side    severe abdominal pain    nausea and vomiting more than 4-5 times a day, or if you are unable to keep anything down    fever more than 100.4 degrees F.          Meet the Midwives from [facility]  You are invited to an informational meet and greet with [facility] Certified Nurse-Midwives. Our free \"Meet the Midwives\" event is a great opportunity to learn about our midwives' philosophy and experience, the hospitals where we can assist with your birth, and answer questions you may have. Partners, friends, and family are welcome to attend. Currently, this is a virtual event.  Date  First Tuesday of every month at 7 pm.    Link to next (live) meeting  [email or web address]://[email or web address].org/classes-and-events/meet-the-midwives-from-[facility]  To Join by Telephone (audio only) Call:   [phone] Phone Conference ID: [id number]          Touring the Maternity Care Center  At this time we are offering a " "virtual tour of the Maternity Care Centers at both [facility] and [facility]:   [facility]: https://www.[employer].org/Locations/[facility]/Maternity-Care-Center  [facility]:   [email or web address]://[email or web address].comorg/[facility]/the-birthplace/tours  https://www.[employer].org/Locations/[facility]/Maternity-Care-Center/#virtual_tour  When in person tours become available, registrations is required. To schedule a tour at either [facility] or [facility], please do so online using the following links:  [facility] - [email or web address]://www.[email or web address]/[email or web address]?[email or web address]&[email or web address]&[email or web address]&p=[email or web address]&[email or web address]&ps=1&group=37&it=1&quo=167  [facility] - [email or web address]://www.[employer]/registerlist.asp?s=[email or web address]&[email or web address]&vs=5&p=2&kfdcy=484&ps=1&group=38&it=1&syp=291      Pre-registration for Hospital Stay:  Sometime betweeen 30-37 weeks, it is recommended that you \"pre-register\" for your upcoming hospital stay on our website:    [email or web address]://sslforms.[employer].org/preregistration/he.asp    Breastfeeding and Birth Control  How do I decide what birth control method is best for me while I am breastfeeding?  Choosing a method of birth control is very personal. First, answer the following questions:      Do you want to have more children?    How much spacing between births do you want for your children?    Do you smoke or have you had any health problems, such as liver disease or a blood clot?  Talk about the answers to each of these questions with your health care provider to help you choose the best method for you.    Can I use breastfeeding as my birth control?  Using breastfeeding as your birth control (the lactational amenorrhea method) can be a good way to keep from getting pregnant in the first months after the baby is born. Each time your baby nurses, your body releases a hormone called prolactin, which stops your body from making the hormones that cause you to ovulate (release an egg). If you are not ovulating, you " cannot get pregnant.    The lactational amenorrhea method works only if:    you have not started your period yet.    you are breastfeeding only and not giving your baby any other food or drink.    you are breastfeeding at least every 4 hours during the day and every 6 hours at night.    your baby is less than 6 months old.  When any 1 of these 4 things is not happening, you no longer have good protection from getting pregnant, and you should use another form of birth control.    What birth control methods are safe for me to use while I breastfeed?    Methods without hormones  Methods without hormones do not affect you, your baby, or your breastfeeding.  Methods without hormones that are the most effective    The copper intrauterine contraceptive device (IUD) (ParaGard) is a small, T-shaped device that is in- serted into your uterus (womb) through the vagina and cervix. The copper IUD lasts for 10 years.    Sterilization (getting your tubes tied or your partner having a vasectomy) is very effective, but it is per- manent. You should choose sterilization only if you do not want to have more children.  A method without hormones that is effective    The lactational amenorrhea method described above is effective for the first 6 months.  Methods without hormones that are less effective    Natural family planning is monitoring your body for signs of ovulation and not having sex when you think you are ovulating. This method is reliable only if you are having regular periods every month.    Barrier methods (condoms, diaphragms, sponges, and spermicides) are used at the time you have sex. These methods are effective only if you use them correctly every time.    Methods with hormones  Birth control methods that use hormones can be used while you are breastfeeding. They may have a small effect on lowering the amount of milk you make. All hormones will get into your breast milk in very small amounts, but there is no known harm  to your baby from this small amount of hormone in breast milk.only methodsmethods use only 1 hormone, called progestin. You can start them right after your baby is born or wait 4 to 6 weeks to make sure your milk supply is good.     Progestin-only pills ( minipills ): If you like to take pills every day, you can use the minipill. In order forpill to work well, you have to take 1 at the same time each day. When you stop breastfeeding, you should start pills that have both estrogen and progestin because they are better at keeping you from get- ting pregnant.     Progestin IUD (Mirena): The progestin IUD is shaped and inserted into the uterus like the copper IUD. It works for up to 5 years. Both IUDs are usually inserted 4 to 6 weeks after the baby is born.    Progestin implant (Nexplanon): The progestin implant is a small matchstick-sized flexible dulce. It is placed into the fatty tissue in the back of your arm. It works for up to 3 years.    Progestin shot (Depo-Provera): The progestin shot is given every 3 months.    estrogen and progestin methods  These methods use 2 hormones, called estrogen and progestin.     These methods increase your risk of a blood clot, which is already higher than normal after you have a baby. You should not use them until your baby is at least 6 weeks old. The combined methods are not recommended as the first choice for women who are breastfeeding. If a combined method is the one that you feel will be best for you to prevent getting pregnant, these methods are okay to use while breastfeeding.     Combined birth control pills: You take a pill each day.    Vaginal ring (NuvaRing): The ring is worn in the vagina for 3 weeks then left out for 1 week before youin a new ring.    Patch (Ortho Evra): The patch is placed on your skin and changed every week for 3 weeks then left off forweek before putting a new patch on a different area of your skin.    Pediatric Care Providers at [facility]  Nurse Midwives [facility]:    Choosing the right provider is one of the most important decisions you ll make about your health care. We can help you find the right one. Remember, you re looking for a provider you can trust and work with to improve your health and well-being, so take time to think about what you need. Depending on how complicated your health care needs are, you may need to see more than one type of provider.    Primary Care Providers: You ll see a primary care provider first for most health issues. They ll work with you to get your recommended screenings, help you manage chronic conditions, and refer you to other types of providers if you need them. Your primary care provider may be called a family physician or doctor, internist, general practitioner, nurse practitioner, or physician s assistant. Your child or teenager s provider may be called a pediatrician.  Specialists: You ll see a specialist for certain services or to treat specific conditions. Specialists include cardiologists, oncologists, psychologists, allergists, podiatrists, and orthopedists. You may need a referral from your primary care provider before you go to a specialist in order for your health plan to pay for your visit.\pardHere are some tips for finding a provider where you live:  If you already have a provider you like and want to keep working with, call their office and ask if they accept your coverage.  Call your insurance company or state Medicaid and CHIP program. Look at their website or check your member handbook to find providers in your network who take your health coverage.  Ask your friends or family if they have providers they like and use these tools to compare health care providers in your area.    Family Medicine at  [facility] Nurse Midwives [facility]:     https://www.[email or web address].org/specialties/family-medicine    Many of our families enjoy all seeing the same doctor, who comes to know the whole family very  well. We base our practice on the knowledge of the patient in the context of family and community.  WHY CHOOSE A FAMILY MEDICINE PHYSICIAN?  Ability of the whole family to see the same doctor  Focus on the whole person, including physical and emotional health  A personal relationship with their doctor that is nurtured over time  Respect for individual and family beliefs and values  No need to change primary care providers when a certain age is reached  Coordination of care when other health care services are needed    Pediatrics at  [facility]:   https://[email or web address]/specialties/pediatric-care    Through a teaching affiliation with the [facility], [facility] staff keeps current on new developments in the field of pediatrics.     Everything we do centers around caring for children. We place special emphasis on wellness and prevention.pediatric care team includes a team of pediatricians and certified nurse practitioners who provide care to pediatric and adolescent patients ages 0 to 18, and some up to the age of 26. We offer preventive health maintenance for healthy children as well the diagnosis and treatment of common and chronic illnesses and injuries. In addition, we also offer several pediatric specialists who focus on adolescent health issues and developmental and behavioral issues.    Circumcision    Educational video:     [email or web address]    What is circumcision?  At birth, baby boys have loose skin that covers the head of the penis. This skin is called the foreskin. When all or part of the foreskin of the penis is cut off, this is called circumcision.  Why is circumcision done?  Circumcision is done for many reasons including Episcopalian, cultural, looks, and health. Some Episcopalian groups circumcise all boys as a darlin-based practice. Many people in the [place] choose to circumcise their baby  boys because they believe it is culturally normal. It is not a common practice in [place], [place], or [place].  Some parents choose circumcision so that their son will have a penis that looks like his father s if the father was also circumcised. Other people choose circumcision because they believe it is  or will protect the boy or man from infection or cancer later in life.  Does circumcision protect against infection or cancer?  Circumcision does seem to protect against some types of infection or cancer. Cancer of the penis is one type of cancer that circumcision may prevent. However, cancer of the penis is very rare. One hundred thousand circumcisions would need to be done to prevent one case of cancer of the penis. Circumcision may also decrease the chance of some sexually transmitted infections, such as HIV and human papilloma virus (HPV). See the next page for more information on the risks and benefits of circumcision.  What happens during a circumcision?  Babies born in the hospital are usually circumcised before they go home. Health care providers also perform circumcisions in their offices and clinics within a few weeks after birth.  Restoration circumcisions are most often done at home or in a Christianity.  Before the circumcision is performed, some providers give an injection (shot) of a small amount of anesthetic (numbing medicine) at the base of the penis to block the pain or put an anesthetic cream on the penis to numb the area that will be cut.  There are 2 different ways to do a circumcision. In one type, a clamp placed around the head of the penis cuts off the blood supply to the foreskin, and the foreskin above the clamp is cut off. The clamp is left on the penis until the area heals and it falls off a few days later. In another type of circumcision, the foreskin is cut off with scissors or a scalpel.  After the circumcision, petroleum jelly and sometimes gauze may be put over the area of the  penis where the skin was removed. This protects the end of the penis while it heals.  Can I keep my son s penis  if it is circumcised?  Regular washing with soap and water will keep any penis clean. Circumcision does not make the penis . Uncircumcised boys do need to be taught to clean beneath their foreskin, just like they need to be taught to wash their hands or brush their teeth.  How do I decide if I should have my son circumcised?  The American Academy of Pediatrics (AAP) says that  circumcision may have health benefits. They do not recommend circumcision for all boys as a routine procedure. The AAP recommends that you talk to your health care provider to decide if circumcision is the right choice for your family. You may also wish to discuss the question with your family or .  What are the risks and benefits of circumcision?  We do not have a lot of good scientific information about the health risks or benefits of circumcision.  Possible Risks:  Very few baby boys (less than 1 in 100) will have a problem after circumcision, such as bleeding or mild infection of the penis. These problems are usually not serious and are easy to treat.  Less common problems are:    Removal of too much or too little foreskin  Some rare problems are:    Narrowing of the opening of the penis, which can cause problems with urination    Removal of part of the penis or death of some of the other skin on the penis   Infection that spreads to other parts of the body  People used to think babies did not really feel pain. Now we know that they do. Many baby boys appear to feel a lot of pain during circumcision if anesthesia is not used.  We do not know if circumcision affects sexual function or sensation.  Possible Benefits:    Less risk for some kinds of cancers, like cancer of the penis    Fewer urinary tract (bladder or kidney) infections for babies    Less risk for some sexually transmitted  infections, such as HIV, herpes, and HPV     May protect female sexual partners from some sexually transmitted infections    For More Information  American Academy of Family Physicians: Circumcision  http://familydoctor.org/familydoctor/en/pregnancy-newborns/caring-for-newborns/infant- care/circumcision.html  MedlinePlus: Circumcision (includes a slide show on the procedure)  www.nlm.nih.gov/medlineplus/circumcision.html  American Academy of Pediatrics: Policy statement on circumcision  Http://pediatrics.aappublications.org/content/130/3/e756.abstract      Childbirth and Parenting Education:     Free Video Series from [facility]: https://www.nursing.[facility]/laborandbirthvideos  [facility]: http://ReimageGuthrie Cortland Medical CenterBarak ITC/   [phone]  [place]: (education, yoga & wellness) www.Joome  Enlightened Mama: www.Denatorenedmama.FireLayers   Childbirth collective: (Parent topic nights)  www.childbirthcollective.org/  Hypnobabies:  www.hypnobabiestwincities.FireLayers/  Hypnobirthing:  Http://hypnobirthing.com/  The Birth Hour: https://LTG Exam Prep Platform/online-childbirth-class/    APPS and Podcasts:   Ovia  Glow Nurture  The Birth Hour (for birth stories)   Birthful   Expectful   The Longest Shortest Time  PregnancyPodcast Berenice García    Book Recommendations:   Latoya Lowell's Birthing From Within--first few chapters include a new-age tone, you may prefer to skip it and keep going, because there is good stuff later.  This book recommendation covers emotional preparation, but does cover coping with pain, and use of both pharmacological and nonpharmacological methods.    Dr. Pena' The Pregnancy Book and The Birth Book--the pregnancy book goes month-by month    Womanly Art of Breastfeeding by La Leche League International   Bestfeeding by Fariba Ramos--great pictures    Mothering Your Nursing Toddler, by Karine Medrano.   Addresses dealing with so many of the challenging behaviors of a nursing  "toddler.  How Weaning Happens, by La Leche League.  Discusses weaning at all ages, from medically necessary weaning of an infant, all the way up to age 5 (or older), with why/why not, and strategies.  Very empowering book both for deciding to wean and deciding not to.    American College of Nurse-Midwives (ACNM) http://www.midwife.org/; look at the informational handouts at http://www.midwife.org/Share-With-Women     www.mymidwife.org    Mother to Baby (Medication and Herbal guidance in pregnancy): http://www.mothertobaby.org  Toll-Free Hotline: 186.997.3330  LactMed (Medication use while breastfeeding): http://toxnet.nlm.nih.gov/newtoxnet/lactmed.htm    Women's Health.gov:  http://www.womenshealth.gov/a-z-topics/index.html    American pregnancy association - http://americanpregnancy.org    Centering Pregnancy (group prenatal care option): http://centeringhealthcare.org    Information about doulas:  Childbirth collective: http://www.childbirthcollective.org/  Doulas of [place] (SCOTT):  www.scott.org  [facility]  project: http://twincitiesdoulaproject.com/     Early Childhood and Family Education (ECFE):  ECFE offers parents hands-on learning experiences that will nourish a lifetime of teachable moments.  http://ecfe.info/ecfe-home/    March of Dimes www.[employer].com     FDA - Nutrition  www.mypyramid.gov  Under \"For Consumers,\" click on \"pregnant and breastfeeding women.\"      Centers for Disease Control and Prevention (CDC) - Vaccines : http://www.cdc.gov/vaccines/       When researching information on the web, question the validity of websites.  The domains .gov, .edu and.org tend to be more reliable information.  If there are a lot of advertisements, be cautious of the information provided. Stay away from blogs and chat rooms please!             Virtual Breastfeeding Support:    During this time of isolation, breastfeeding families need even more community!  Here are some area organizations " "offering virtual support groups for breastfeeding:      [facility],  at 10:30 am   Run by JUANITA Bhardwaj of [employer]   Go to [employer] Facebook page and click on \"events\" for link   https://www.[employer].com/events/903416593598192/    [facility] Milk Hour,  at 2:30 pm    Run by JUANITA Jones   Go to [facility] FB page and send message to get link   https://www.[employer].com/[facility] Valley Surgical HospitalNXTMundations/    [facility] holding virtual meetings the first Tuesday of each month, 8-9 pm, and the   Third Saturday, 10 - 11 am.  Go to [facility] and [place] FB page; message to get link https://www.[employer].Stentys/LLLofGoldenValandrade/?hc_location=[facility]    [facility] offers a Lactation Lounge every Friday 12pm - 1pm, run by Bobbi Cazares Leonard Leader   Sign up via link at Signpost/cbe-lactation   https://www.Signpost/cbe-lactation    [facility] is offering virtual support groups every Monday, 10:30 am - 12 pm, run by nurse IBCLC   Https://www.[employer].com/events/[email or web address]/    Prenatal Breastfeeding Classes:        [employer] is offering virtual breastfeeding and  care classes:  https://www.Signpost/education-workshops    BirthEd childbirth and breastfeeding education offering virtual prenatal breastfeeding classes  https://www.birthedmn.com/workshops      "

## 2021-07-12 NOTE — PROGRESS NOTES
Here alone for a routine prenatal visit at 32w0d. Offers no questions or concerns. 28 wk labs reviewed. Plans breastfeeding. Struggled with mastitis with first baby, thinks related to longer work shifts. Plans 16 wk maternity leave and Davon plans 10 wk paternity leave. Davon's parents are local and supportive- currently providing them childcare! Plans circ- will check insurance for coverage details. Reports normal fetal movements. Denies regular painful contractions, bleeding, leaking, changes in fetal movement.     32 week pregnancy checklist started. Reviewed  labor precautions, warning signs and when/how to call the on-call CNM.

## 2021-07-14 PROBLEM — O16.3 ELEVATED BLOOD PRESSURE AFFECTING PREGNANCY IN THIRD TRIMESTER, ANTEPARTUM: Status: RESOLVED | Noted: 2017-06-24 | Resolved: 2017-07-31

## 2021-07-14 PROBLEM — Z97.5 IUD (INTRAUTERINE DEVICE) IN PLACE: Status: RESOLVED | Noted: 2019-04-03 | Resolved: 2020-09-04

## 2021-07-14 PROBLEM — Z30.8 ENCOUNTER FOR OTHER CONTRACEPTIVE MANAGEMENT: Status: RESOLVED | Noted: 2019-02-19 | Resolved: 2019-04-29

## 2021-07-14 PROBLEM — Z34.83 ENCOUNTER FOR SUPERVISION OF OTHER NORMAL PREGNANCY, THIRD TRIMESTER: Status: RESOLVED | Noted: 2018-07-30 | Resolved: 2019-02-19

## 2021-07-14 PROBLEM — Z34.90 PREGNANT: Status: RESOLVED | Noted: 2017-06-24 | Resolved: 2017-07-31

## 2021-07-14 PROBLEM — O14.90 PRE-ECLAMPSIA, ANTEPARTUM: Status: RESOLVED | Noted: 2019-02-17 | Resolved: 2019-04-29

## 2021-07-14 PROBLEM — Z21 HIV ANTIBODY POSITIVE (H): Status: RESOLVED | Noted: 2018-08-13 | Resolved: 2019-04-03

## 2021-07-14 PROBLEM — O26.843 SIGNIFICANT DISCREPANCY BETWEEN UTERINE SIZE AND CLINICAL DATES, ANTEPARTUM, THIRD TRIMESTER: Status: RESOLVED | Noted: 2017-06-23 | Resolved: 2017-07-31

## 2021-07-14 PROBLEM — Z37.9 NORMAL LABOR: Status: RESOLVED | Noted: 2017-06-24 | Resolved: 2017-07-31

## 2021-07-14 PROBLEM — O48.0 POST-TERM PREGNANCY, 40-42 WEEKS OF GESTATION: Status: RESOLVED | Noted: 2017-06-23 | Resolved: 2017-07-31

## 2021-07-14 PROBLEM — Z34.90 PREGNANT: Status: RESOLVED | Noted: 2019-02-17 | Resolved: 2019-02-19

## 2021-07-28 ENCOUNTER — PRENATAL OFFICE VISIT (OUTPATIENT)
Dept: MIDWIFE SERVICES | Facility: CLINIC | Age: 31
End: 2021-07-28
Payer: COMMERCIAL

## 2021-07-28 VITALS
BODY MASS INDEX: 24.69 KG/M2 | WEIGHT: 153 LBS | HEART RATE: 88 BPM | DIASTOLIC BLOOD PRESSURE: 64 MMHG | SYSTOLIC BLOOD PRESSURE: 108 MMHG | OXYGEN SATURATION: 100 %

## 2021-07-28 DIAGNOSIS — O09.299 HISTORY OF PRE-ECLAMPSIA IN PRIOR PREGNANCY, CURRENTLY PREGNANT: ICD-10-CM

## 2021-07-28 DIAGNOSIS — Z34.80 SUPERVISION OF OTHER NORMAL PREGNANCY, ANTEPARTUM: Primary | ICD-10-CM

## 2021-07-28 PROCEDURE — 99207 PR PRENATAL VISIT: CPT | Performed by: ADVANCED PRACTICE MIDWIFE

## 2021-07-28 NOTE — PROGRESS NOTES
Cayla presents with Davon for a routine PNV at 34w 2d.  Feeling overall well.  Concerns today:  Advice regarding limiting COVID exposure prior to and after birth.  Discussed r/b/a of quarantine around time of birth. Both Cayla and Davon are vaccinated for COVID.    This CNM advised focusing more specifically on limiting exposure for RSV after birth as there are many cases currently and has known risks for newborns.   Discussed and reviewed maternal growth chart and marked pregnancy checklist items. EPDS today = 3/30, 0 to Q#10. RTC 2 weeks, sooner as needed. Anticipatory guidance given for next visit: GBS and Hgb. Has CNM numbers and aware to call with PTL, s/s PEC, or any questions or concerns.      NELDA Ibrahim CNM, SHAUNA, CLC  7/28/2021 5:10 PM

## 2021-07-28 NOTE — PATIENT INSTRUCTIONS
"[facility] Nurse Midwives [facility]  Contact information:  Appointment line and to get a hold of CNM in clinic Monday-Friday 8 am - 5 pm:  [phone].  There are some clinics with early start times (1st appointment 7:40 am) and others with evening hours (last appointment 6:20 pm).  Most are typically open from 8 am to 5 pm.    CNM on call answering service: [phone].  Specify your hospital of choice and leave a brief message for CNM;  will then page CNM who is on call at your specified hospital and you should receive a call back with 15 minutes.  Be sure that your ringer is audible and that you can accept blocked calls so that we can get back in touch with you! This number should be reserved for urgent needs if during the day, before 8 am, after 5 pm, weekends, holidays.    Contact the on-call CNM with warning signs, such as:    vaginal bleeding     Vaginal discharge and itching or pain and burning during urination    Leg/calf pain or swelling on one side    severe abdominal pain    nausea and vomiting more than 4-5 times a day, or if you are unable to keep anything down    fever more than 100.4 degrees F.          Meet the Midwives from [facility]  You are invited to an informational meet and greet with [facility] Certified Nurse-Midwives. Our free \"Meet the Midwives\" event is a great opportunity to learn about our midwives' philosophy and experience, the hospitals where we can assist with your birth, and answer questions you may have. Partners, friends, and family are welcome to attend. Currently, this is a virtual event.  Date  First Tuesday of every month at 7 pm.    Link to next (live) meeting  [email or web address]/classes-and-[email or web address]/meet-the-midwives-from-[facility]  To Join by Telephone (audio only) Call:   [phone] Phone Conference ID: [id number]#          Touring the Maternity Care Center  At this time we are offering a " "virtual tour of the Maternity Care Centers at both [facility] and [facility]:   [facility]: https://www.CircuitHub.org/Locations/[facility]/Maternity-Care-Center  [place]:   https://Wananchi Grouporg/[facility]/the-birthplace/tours  https://www.CircuitHub.org/Locations/[facility]/Maternity-Care-Center/#virtual_tour  When in person tours become available, registrations is required. To schedule a tour at either [place] or [facility], please do so online using the following links:  [facility] - [email or web address].[email or web address]&[email or web address]&[email or web address]&[email or web address]&[email or web address]&[email or web address]&it=1&[email or web address]  [facility] - [email or web address]://www.ProfitPoint/[email or web address]?[email or web address]&[email or web address]&vs=5&p=[email or web address]&[email or web address]&[email or web address]&group=38&it=1&miu=582      Pre-registration for Hospital Stay:  Sometime betweeen 30-37 weeks, it is recommended that you \"pre-register\" for your upcoming hospital stay on our website:    [email or web address]    Breastfeeding and Birth Control  How do I decide what birth control method is best for me while I am breastfeeding?  Choosing a method of birth control is very personal. First, answer the following questions:      Do you want to have more children?    How much spacing between births do you want for your children?    Do you smoke or have you had any health problems, such as liver disease or a blood clot?  Talk about the answers to each of these questions with your health care provider to help you choose the best method for you.    Can I use breastfeeding as my birth control?  Using breastfeeding as your birth control (the lactational amenorrhea method) can be a good way to keep from getting pregnant in the first months after the baby is born. Each time your baby nurses, your body releases a hormone called prolactin, which stops your body from making the hormones that cause you to ovulate (release an egg). If you are not ovulating, you " cannot get pregnant.    The lactational amenorrhea method works only if:    you have not started your period yet.    you are breastfeeding only and not giving your baby any other food or drink.    you are breastfeeding at least every 4 hours during the day and every 6 hours at night.    your baby is less than 6 months old.  When any 1 of these 4 things is not happening, you no longer have good protection from getting pregnant, and you should use another form of birth control.    What birth control methods are safe for me to use while I breastfeed?    Methods without hormones  Methods without hormones do not affect you, your baby, or your breastfeeding.  Methods without hormones that are the most effective    The copper intrauterine contraceptive device (IUD) (ParaGard) is a small, T-shaped device that is in- serted into your uterus (womb) through the vagina and cervix. The copper IUD lasts for 10 years.    Sterilization (getting your tubes tied or your partner having a vasectomy) is very effective, but it is per- manent. You should choose sterilization only if you do not want to have more children.  A method without hormones that is effective    The lactational amenorrhea method described above is effective for the first 6 months.  Methods without hormones that are less effective    Natural family planning is monitoring your body for signs of ovulation and not having sex when you think you are ovulating. This method is reliable only if you are having regular periods every month.    Barrier methods (condoms, diaphragms, sponges, and spermicides) are used at the time you have sex. These methods are effective only if you use them correctly every time.    Methods with hormones  Birth control methods that use hormones can be used while you are breastfeeding. They may have a small effect on lowering the amount of milk you make. All hormones will get into your breast milk in very small amounts, but there is no known harm  to your baby from this small amount of hormone in breast milk.only methodsmethods use only 1 hormone, called progestin. You can start them right after your baby is born or wait 4 to 6 weeks to make sure your milk supply is good.     Progestin-only pills ( minipills ): If you like to take pills every day, you can use the minipill. In order forpill to work well, you have to take 1 at the same time each day. When you stop breastfeeding, you should start pills that have both estrogen and progestin because they are better at keeping you from get- ting pregnant.     Progestin IUD (Mirena): The progestin IUD is shaped and inserted into the uterus like the copper IUD. It works for up to 5 years. Both IUDs are usually inserted 4 to 6 weeks after the baby is born.    Progestin implant (Nexplanon): The progestin implant is a small matchstick-sized flexible dulce. It is placed into the fatty tissue in the back of your arm. It works for up to 3 years.    Progestin shot (Depo-Provera): The progestin shot is given every 3 months.    estrogen and progestin methods  These methods use 2 hormones, called estrogen and progestin.     These methods increase your risk of a blood clot, which is already higher than normal after you have a baby. You should not use them until your baby is at least 6 weeks old. The combined methods are not recommended as the first choice for women who are breastfeeding. If a combined method is the one that you feel will be best for you to prevent getting pregnant, these methods are okay to use while breastfeeding.     Combined birth control pills: You take a pill each day.    Vaginal ring (NuvaRing): The ring is worn in the vagina for 3 weeks then left out for 1 week before youin a new ring.    Patch (Ortho Evra): The patch is placed on your skin and changed every week for 3 weeks then left off forweek before putting a new patch on a different area of your skin.    Pediatric Care Providers at [facility]  Nurse Midwives [facility]:    Choosing the right provider is one of the most important decisions you ll make about your health care. We can help you find the right one. Remember, you re looking for a provider you can trust and work with to improve your health and well-being, so take time to think about what you need. Depending on how complicated your health care needs are, you may need to see more than one type of provider.    Primary Care Providers: You ll see a primary care provider first for most health issues. They ll work with you to get your recommended screenings, help you manage chronic conditions, and refer you to other types of providers if you need them. Your primary care provider may be called a family physician or doctor, internist, general practitioner, nurse practitioner, or physician s assistant. Your child or teenager s provider may be called a pediatrician.  Specialists: You ll see a specialist for certain services or to treat specific conditions. Specialists include cardiologists, oncologists, psychologists, allergists, podiatrists, and orthopedists. You may need a referral from your primary care provider before you go to a specialist in order for your health plan to pay for your visit.\pardHere are some tips for finding a provider where you live:  If you already have a provider you like and want to keep working with, call their office and ask if they accept your coverage.  Call your insurance company or state Medicaid and CHIP program. Look at their website or check your member handbook to find providers in your network who take your health coverage.  Ask your friends or family if they have providers they like and use these tools to compare health care providers in your area.    Family Medicine at  [facility] Nurse Midwives [facility]:     [email or web address]/[email or web address]/family-medicine    Many of our families enjoy all seeing the same doctor, who comes to know the whole family very  well. We base our practice on the knowledge of the patient in the context of family and community.  WHY CHOOSE A FAMILY MEDICINE PHYSICIAN?  Ability of the whole family to see the same doctor  Focus on the whole person, including physical and emotional health  A personal relationship with their doctor that is nurtured over time  Respect for individual and family beliefs and values  No need to change primary care providers when a certain age is reached  Coordination of care when other health care services are needed    Pediatrics at  [facility]:   [email or web address]://[email or web address]/[email or web address]/[email or web address]-care    Through a teaching affiliation with the [facility], [facility] staff keeps current on new developments in the field of pediatrics.     Everything we do centers around caring for children. We place special emphasis on wellness and prevention.pediatric care team includes a team of pediatricians and certified nurse practitioners who provide care to pediatric and adolescent patients ages 0 to 18, and some up to the age of 26. We offer preventive health maintenance for healthy children as well the diagnosis and treatment of common and chronic illnesses and injuries. In addition, we also offer several pediatric specialists who focus on adolescent health issues and developmental and behavioral issues.    Circumcision    Educational video:     [email or web address] [email or web address]    What is circumcision?  At birth, baby boys have loose skin that covers the head of the penis. This skin is called the foreskin. When all or part of the foreskin of the penis is cut off, this is called circumcision.  Why is circumcision done?  Circumcision is done for many reasons including Taoist, cultural, looks, and health. Some Taoist groups circumcise all boys as a darlin-based practice. Many people in the [place] choose to circumcise their baby  boys because they believe it is culturally normal. It is not a common practice in [place], [place], or [place].  Some parents choose circumcision so that their son will have a penis that looks like his father s if the father was also circumcised. Other people choose circumcision because they believe it is  or will protect the boy or man from infection or cancer later in life.  Does circumcision protect against infection or cancer?  Circumcision does seem to protect against some types of infection or cancer. Cancer of the penis is one type of cancer that circumcision may prevent. However, cancer of the penis is very rare. One hundred thousand circumcisions would need to be done to prevent one case of cancer of the penis. Circumcision may also decrease the chance of some sexually transmitted infections, such as HIV and human papilloma virus (HPV). See the next page for more information on the risks and benefits of circumcision.  What happens during a circumcision?  Babies born in the hospital are usually circumcised before they go home. Health care providers also perform circumcisions in their offices and clinics within a few weeks after birth.  Anglican circumcisions are most often done at home or in a Orthodoxy.  Before the circumcision is performed, some providers give an injection (shot) of a small amount of anesthetic (numbing medicine) at the base of the penis to block the pain or put an anesthetic cream on the penis to numb the area that will be cut.  There are 2 different ways to do a circumcision. In one type, a clamp placed around the head of the penis cuts off the blood supply to the foreskin, and the foreskin above the clamp is cut off. The clamp is left on the penis until the area heals and it falls off a few days later. In another type of circumcision, the foreskin is cut off with scissors or a scalpel.  After the circumcision, petroleum jelly and sometimes gauze may be put over the area of the  penis where the skin was removed. This protects the end of the penis while it heals.  Can I keep my son s penis  if it is circumcised?  Regular washing with soap and water will keep any penis clean. Circumcision does not make the penis . Uncircumcised boys do need to be taught to clean beneath their foreskin, just like they need to be taught to wash their hands or brush their teeth.  How do I decide if I should have my son circumcised?  The American Academy of Pediatrics (AAP) says that  circumcision may have health benefits. They do not recommend circumcision for all boys as a routine procedure. The AAP recommends that you talk to your health care provider to decide if circumcision is the right choice for your family. You may also wish to discuss the question with your family or .  What are the risks and benefits of circumcision?  We do not have a lot of good scientific information about the health risks or benefits of circumcision.  Possible Risks:  Very few baby boys (less than 1 in 100) will have a problem after circumcision, such as bleeding or mild infection of the penis. These problems are usually not serious and are easy to treat.  Less common problems are:    Removal of too much or too little foreskin  Some rare problems are:    Narrowing of the opening of the penis, which can cause problems with urination    Removal of part of the penis or death of some of the other skin on the penis   Infection that spreads to other parts of the body  People used to think babies did not really feel pain. Now we know that they do. Many baby boys appear to feel a lot of pain during circumcision if anesthesia is not used.  We do not know if circumcision affects sexual function or sensation.  Possible Benefits:    Less risk for some kinds of cancers, like cancer of the penis    Fewer urinary tract (bladder or kidney) infections for babies    Less risk for some sexually transmitted  infections, such as HIV, herpes, and HPV     May protect female sexual partners from some sexually transmitted infections    For More Information  American Academy of Family Physicians: Circumcision  http://familydoctor.org/familydoctor/en/pregnancy-newborns/caring-for-newborns/infant- care/circumcision.html  MedlinePlus: Circumcision (includes a slide show on the procedure)  www.nlm.nih.gov/medlineplus/circumcision.html  American Academy of Pediatrics: Policy statement on circumcision  Http://pediatrics.aappublications.org/content/130/3/e756.abstract      Childbirth and Parenting Education:     Free Video Series from [facility]: https://www.nursing.[facility]/laborandbirthvideos  [facility]: http://WeatherBugE[facility], [facility] DivisionSmacktive.com/   [phone]  [place]: (education, yoga & wellness) www.[employer]  Enlightened Mama: www.KidNimbleenedmama.Hydra Dx   Childbirth collective: (Parent topic nights)  www.childbirthcollective.org/  Hypnobabies:  www.hypnobabiestwincities.Hydra Dx/  Hypnobirthing:  Http://hypnobirthing.com/  The Birth Hour: https://Patron Technology/online-childbirth-class/    APPS and Podcasts:   Ovia  Glow Nurture  The Birth Hour (for birth stories)   Birthful   Expectful   The Longest Shortest Time  PregnancyPodcast Berenice García    Book Recommendations:   Latoya Smithdale's Birthing From Within--first few chapters include a new-age tone, you may prefer to skip it and keep going, because there is good stuff later.  This book recommendation covers emotional preparation, but does cover coping with pain, and use of both pharmacological and nonpharmacological methods.    Dr. Pena' The Pregnancy Book and The Birth Book--the pregnancy book goes month-by month    Womanly Art of Breastfeeding by La Leche League International   Bestfeeding by Fariba Ramos--great pictures    Mothering Your Nursing Toddler, by Karine Medrano.   Addresses dealing with so many of the challenging behaviors of a nursing  "toddler.  How Weaning Happens, by La Leche League.  Discusses weaning at all ages, from medically necessary weaning of an infant, all the way up to age 5 (or older), with why/why not, and strategies.  Very empowering book both for deciding to wean and deciding not to.    American College of Nurse-Midwives (ACNM) http://www.midwife.org/; look at the informational handouts at http://www.midwife.org/Share-With-Women     www.mymidwife.org    Mother to Baby (Medication and Herbal guidance in pregnancy): http://www.mothertobaby.org  Toll-Free Hotline: 891.263.8134  LactMed (Medication use while breastfeeding): http://toxnet.nlm.nih.gov/newtoxnet/lactmed.htm    Women's Health.gov:  http://www.womenshealth.gov/a-z-topics/index.html    American pregnancy association - http://americanpregnancy.org    Centering Pregnancy (group prenatal care option): http://centeringhealthcare.org    Information about doulas:  Childbirth collective: http://www.childbirthcollective.org/  Doulas of [place] (SCOTT):  www.scott.org  [facility]  project: http://twincitiesdoulaproject.com/     Early Childhood and Family Education (ECFE):  ECFE offers parents hands-on learning experiences that will nourish a lifetime of teachable moments.  http://ecfe.info/ecfe-home/    March of Dimes www.Valence Technology.com     FDA - Nutrition  www.mypyramid.gov  Under \"For Consumers,\" click on \"pregnant and breastfeeding women.\"      Centers for Disease Control and Prevention (CDC) - Vaccines : http://www.cdc.gov/vaccines/       When researching information on the web, question the validity of websites.  The domains .gov, .edu and.org tend to be more reliable information.  If there are a lot of advertisements, be cautious of the information provided. Stay away from blogs and chat rooms please!             Virtual Breastfeeding Support:    During this time of isolation, breastfeeding families need even more community!  Here are some area organizations " "offering virtual support groups for breastfeeding:      [facility] Support [facility],  at 10:30 am   Run by JUANITA Bhardwaj of [employer]   Go to [employer] Facebook page and click on \"events\" for link   https://www.The Noun Project.com/events/251017530887380/    [facility] Milk Hour,  at 2:30 pm    Run by JUANITA Jones   Go to [facility] FB page and send message to get link   https://www.The Noun Project.com/[facility] CompanyCausesundations/    [facility] holding virtual meetings the first Tuesday of each month, 8-9 pm, and the   Third Saturday, 10 - 11 am.  Go to [facility] and [facility] FB page; message to get link https://www.The Noun Project.SPIRIT Navigation/LLLofGoldenValandrade/?hc_location=[facility]    [place] offers a Lactation Lounge every Friday 12pm - 1pm, run by Bobbi Cazares Leonard Leader   Sign up via link at Livestation/cbe-lactation   https://www.Livestation/cbe-lactation    [facility] is offering virtual support groups every Monday, 10:30 am - 12 pm, run by nurse IBCLC   Https://www.The Noun Project.com/events/240933971126161/    Prenatal Breastfeeding Classes:        [employer] is offering virtual breastfeeding and  care classes:  https://www.Livestation/education-workshops    BirthEd childbirth and breastfeeding education offering virtual prenatal breastfeeding classes  https://www.birthedmn.com/workshops    "

## 2021-08-13 ENCOUNTER — PRENATAL OFFICE VISIT (OUTPATIENT)
Dept: MIDWIFE SERVICES | Facility: CLINIC | Age: 31
End: 2021-08-13
Payer: COMMERCIAL

## 2021-08-13 VITALS
HEART RATE: 80 BPM | DIASTOLIC BLOOD PRESSURE: 64 MMHG | SYSTOLIC BLOOD PRESSURE: 108 MMHG | BODY MASS INDEX: 25.02 KG/M2 | WEIGHT: 155 LBS

## 2021-08-13 DIAGNOSIS — Z34.80 SUPERVISION OF OTHER NORMAL PREGNANCY, ANTEPARTUM: Primary | ICD-10-CM

## 2021-08-13 DIAGNOSIS — Z34.80 SUPERVISION OF OTHER NORMAL PREGNANCY, ANTEPARTUM: ICD-10-CM

## 2021-08-13 DIAGNOSIS — O09.299 HISTORY OF PRE-ECLAMPSIA IN PRIOR PREGNANCY, CURRENTLY PREGNANT: ICD-10-CM

## 2021-08-13 PROBLEM — O21.9 NAUSEA AND VOMITING OF PREGNANCY, ANTEPARTUM: Status: ACTIVE | Noted: 2021-02-01

## 2021-08-13 LAB — HGB BLD-MCNC: 11.5 G/DL (ref 11.7–15.7)

## 2021-08-13 PROCEDURE — 87653 STREP B DNA AMP PROBE: CPT | Performed by: ADVANCED PRACTICE MIDWIFE

## 2021-08-13 PROCEDURE — 99207 PR PRENATAL VISIT: CPT | Performed by: ADVANCED PRACTICE MIDWIFE

## 2021-08-13 PROCEDURE — 36415 COLL VENOUS BLD VENIPUNCTURE: CPT | Performed by: ADVANCED PRACTICE MIDWIFE

## 2021-08-13 PROCEDURE — 85018 HEMOGLOBIN: CPT | Performed by: ADVANCED PRACTICE MIDWIFE

## 2021-08-13 NOTE — PROGRESS NOTES
Cayla presents to the clinic with Davon.  No concerns.  Denies loss of fluid, regular uterine contractions or vaginal bleeding.  Endorses good fetal movement.  GBS RV culture self obtained.  Hemoglobin ordered.  SVE reveals cephalic presentation/sutures palpated.   labor precautions and danger signs and symptoms reviewed.  Early versus active labor written information given.  All questions answered.  Encouraged daily fetal movement counting and to call or return to clinic with any questions, concerns, or as needed.

## 2021-08-14 LAB
GP B STREP DNA SPEC QL NAA+PROBE: NEGATIVE
PATIENT PENICILLIN, AMOXICILLIN, CEPHALOSPORINS ALLERGY: NO

## 2021-08-16 NOTE — PROGRESS NOTES
Cayla presents to [facility] clinic with Velma for her routine 37w2d PNV. Feeling well. GBS neg and hgb 11.5 reviewed from last week.  Baby active. Reviewed labor instructions and [facility]. A little worried about hx of fast labor and when to call/come in.    Pregnancy checklist 38-41 weeks:  Postpartum Immunizations   Post-Term Management

## 2021-08-18 ENCOUNTER — PRENATAL OFFICE VISIT (OUTPATIENT)
Dept: MIDWIFE SERVICES | Facility: CLINIC | Age: 31
End: 2021-08-18
Payer: COMMERCIAL

## 2021-08-18 VITALS
WEIGHT: 155 LBS | BODY MASS INDEX: 25.02 KG/M2 | HEART RATE: 82 BPM | SYSTOLIC BLOOD PRESSURE: 112 MMHG | DIASTOLIC BLOOD PRESSURE: 69 MMHG

## 2021-08-18 DIAGNOSIS — Z34.80 SUPERVISION OF OTHER NORMAL PREGNANCY, ANTEPARTUM: ICD-10-CM

## 2021-08-18 PROBLEM — O09.219 HX OF PRECIPITOUS LABOR AND DELIVERIES, ANTEPARTUM: Status: ACTIVE | Noted: 2021-08-18

## 2021-08-18 PROCEDURE — 99207 PR PRENATAL VISIT: CPT | Performed by: MIDWIFE

## 2021-08-18 NOTE — PATIENT INSTRUCTIONS
"[facility] Nurse Midwives - Contact information:  Appointment line and to get a hold of CNM in clinic Monday-Friday 8 am - 5 pm:  [phone].  There are some clinics with early start times (1st appointment 7:40 am) and others with evening hours (last appointment 6:20 pm).  Most are typically open from 8 am to 5 pm.    CNM on call answering service: [phone].  Specify your hospital of choice and leave a brief message for CNM;  will then page CNM who is on call at your specified hospital and you should receive a call back with 15 minutes.  Be sure that your ringer is audible and that you can accept blocked calls so that we can get back in touch with you! This number should be reserved for urgent needs if during the day, before 8 am, after 5 pm, weekends, holidays.    Contact the on-call CNM with warning signs, such as:    vaginal bleeding     Vaginal discharge and itching or pain and burning during urination    Leg/calf pain or swelling on one side    severe abdominal pain    nausea and vomiting more than 4-5 times a day, or if you are unable to keep anything down    fever more than 100.4 degrees F.       Meet the Midwives from [facility]  You are invited to an informational meet and greet with [facility] Certified Nurse-Midwives. Our free \"Meet the Midwives\" event is a great opportunity to learn about our midwives' philosophy and experience, the hospitals where we can assist with your birth, and answer questions you may have. Partners, friends, and family are welcome to attend. Currently, this is a virtual event.  Date  First Tuesday of every month at 7 pm.    Link to next (live) meeting  https://www.[facility].org/classes-and-events/meet-the-midwives-from-[facility]-clinics  To Join by Telephone (audio only) Call:   [phone] Phone Conference ID: [id number]#          Touring the Maternity Care Center  At this time we are offering a virtual tour of the " Maternity Care Centers at both [facility] and [facility]:   [facility]: https://www.[place].org/Locations/[facility]/Maternity-Care-Center  [facility]:   https://[facility].org/overarchCardinal [facility]-care/the-birthplace/tours  https://www.[place].org/Locations/[facility]/Maternity-Care-Center/#virtual_tour  When in person tours become available, registrations is required. To schedule a tour at either [facility] or [facility], please do so online using the following links:  [facility] - [email or web address]://www.Groupspeak/registerlist.asp?s=[email or web address]&m=303&vs=5&p=2&sbeyv=281&ps=1&group=37&it=1&twp=748  [facility] - https://www.Groupspeak/registerlist.asp?s=6&m=303&vs=5&p=2&czvhs=859&ps=1&group=38&it=1&dhr=106    Childbirth and Parenting Education:     Free Video Series from [facility]: https://www.nursing.[facility]/laborandbirthvideos  [facility]: http://[facility] CenterGlaxstar.Moodlerooms/   [phone]  [place]: (education, yoga & wellness) www.[facility]  Enlightened Mama: www.Hymiteenedmama.Moodlerooms   Childbirth collective: (Parent topic nights)  www.childbirthcollective.org/  Hypnobabies:  www.hypnobabiestwincities.com/  Hypnobirthing:  Http://hypnobirthing.com/  The Birth Hour: https://theGruppo ArgentahouAdrenaline Mobility/online-childbirth-class/    APPS and Podcasts:   Mary Villagomez  The Birth Hour (for birth stories)   Birthful   Expectful   The Longest Shortest Time  PregnancyPodcast Berenice García    Breastfeeding Information:  An excellent 15-minute video on preparing for a good breastfeeding experience, including how to ensure you have a good milk supply and a comfortable latch, can be found here:  https://firstdroplets.com/      Book Recommendations:   Latoya Old Station's Birthing From Within--first few chapters include a new-age tone, you may prefer to skip it and keep going, because there is good stuff later.  This book recommendation covers emotional preparation, but does cover  "coping with pain, and use of both pharmacological and nonpharmacological methods.    Dr. Pena' The Pregnancy Book and The Birth Book--the pregnancy book goes month-by month    Womanly Art of Breastfeeding by La Leche League International   Breastfeeding by Fariba Ramos--great pictures    Mothering Your Nursing Toddler, by Karine Medrano.   Addresses dealing with so many of the challenging behaviors of a nursing toddler.  How Weaning Happens, by La Leche League.  Discusses weaning at all ages, from medically necessary weaning of an infant, all the way up to age 5 (or older), with why/why not, and strategies.  Very empowering book both for deciding to wean and deciding not to.    American College of Nurse-Midwives (ACNM) http://www.midwife.org/; look at the informational handouts at http://www.midwife.org/Share-With-Women     www.mymidwife.org    Mother to Baby (Medication and Herbal guidance in pregnancy): http://www.mothertobaby.org  Toll-Free Hotline: 327.724.8721  LactMed (Medication use while breastfeeding): http://toxnet.nlm.nih.gov/newtoxnet/lactmed.htm    Women's Health.gov:  http://www.womenshealth.gov/a-z-topics/index.html    American pregnancy association - http://americanpregnancy.org    Centering Pregnancy (group prenatal care option): http://centeringhealthcare.org    Information about doulas:  Childbirth collective: http://www.childbirthcollective.org/  Doulas of [place] (SCOTT):  www.scott.org  [facility]  project: http://twincitiesdoulaproject.com/     Early Childhood and Family Education (ECFE):  ECFE offers parents hands-on learning experiences that will nourish a lifetime of teachable moments.  http://ecfe.info/ecfe-home/    March of Dimes www.Airstone - Nutrition  www.mypyramid.gov  Under \"For Consumers,\" click on \"pregnant and breastfeeding women.\"      Centers for Disease Control and Prevention (CDC) - Vaccines : http://www.cdc.gov/vaccines/       When " researching information on the web, question the validity of websites.  The Startupxplore .gov, .edu and.org tend to be more reliable information.  If there are a lot of advertisements, be cautious of the information provided. Stay away from blogs and chat rooms please!     [place] Screening Program  Http://www.health.[facility].[place].us/newbornscreening/  [place] newborns are tested soon after birth for more than 50 hidden, rare disorders, including hearing loss and critical congenital heart disease (CCHD). This site provides resources and information for families and providers.    HEALTHY PREGNANCY CARE: 37 to 41 WEEKS PREGNANT    Talk with your midwife or physician about when to call with signs of labor    Regular uterine contractions that are getting closer together and/or stronger    If you think your water has broken or is leaking    Bleeding from the vagina like a period (bloody vaginal discharge is normal)    If you are not feeling your baby move    Make plans for transportation and  as needed for when you are going to the hospital.    Your midwife or physician may offer to check your cervix for changes.     Ask your health care provider about vaccinations you may need following delivery. By now, you should have received a Tdap immunization to protect against pertussis or whooping cough. Fathers and family members who will be in close contact with the baby should also receive a Tdap shot at least two weeks before the expected birth of the baby if they have not had a Td (tetanus) shot for at least two years.    If you are past your due date, discuss the next steps leading to delivery with your midwife or physician. If you don't start labor on your own by 41 or 42 weeks, your midwife or physician may recommend giving you medicines to ripen your cervix and start labor.  Induction of labor:  http://onlinelibrary.bergman.com/store/10.1016/j.jmwh.2008.04.018/asset/j.jmwh.2008.04.018.pdf?v=1&t=etwo6chv&q=27qj698e3ot02u40m4g3ya6d117691e2vr4sa150    Preparing for your baby: Tell your midwife or physician how you plan to feed your baby (breast or bottle), who you have chosen to do pediatric care for your baby, and if you have a boy, whether you have chosen to have him circumcised. You will need a car seat correctly installed in your vehicle to bring your baby home. As you start to set up the nursery at home for your baby, make sure the crib is safe. The mattress needs to fit snugly against the edges of the crib. If you can fit a soda can between the bars, they are too far apart and can allow the baby's head to caught between them.    Learn about infant care and feeding, including information about infant CPR. We recommend that you put your baby to sleep on his or her back to reduce the chance of Sudden Infant Death Syndrome (SIDS). To maintain a healthy environment in which your child can grow, it's best to keep your home smoke-free. By preparing ahead, your transition into parenthood will go smoothly for you and your baby.    Your midwife or physician will want to see you for a checkup 2 to 6 weeks after delivery.    If you have questions about any symptoms you are experiencing or any other concerns, call your provider or their clinic staff at [facility] [place] at Dept: [phone]. If it's after clinic hours, physician patients should call the Care Connection at [phone] ([phone]); midwife patients should call their answering service at [phone].    How can you care for yourself at home?   You can refer to the Starting Out Right book or find it online at http://www.healtheast.org/images/stories/maternity/[facility]-Starting-Out-Right.pdf or http://www.healtheast.org/images/stories/flipbooks/[facility]-starting-out-right/[facility]-starting-out-right.html#p=8     You can sign up for a  weekly parenting e-mail that gives support, tips and advice from health care professionals that starts with pregnancy and continues through the toddler years. To register, go to www.[facility].org/baby at any time during your pregnancy.        Making Plans for Feeding My Baby    By this point, you probably have read a lot about feeding your baby.  Breastfeed or formula? Each mother s decision is her own and [facility] respects you and your choices. We ve gathered information on both breastfeeding and formula feeding to help with your decision. Talking with your physician or nurse-midwife can also help in your decision.  However you plan to feed your baby, [facility] Maternity Care Centers encourage rooming in with your baby, skin-to-skin contact and feeding your baby based on his or her cues.    Skin-to-skin contact  Being close to mom helps your baby adjust to life outside of the womb.  It helps your baby regulate their body temperature, heart rate, and breathing.  Your baby will usually be placed skin-to-skin immediately following birth or as soon as possible, if medical intervention is needed.    Rooming-In  Having your baby stay with you in your room is called  rooming-in .  Keeping your baby in your room helps you to learn how to care for your baby by getting to know your baby s cues, body rhythms and sleep cycle.       Cue-based feeding  Cues (signals) are baby s way of telling you what he or she wants.  When you learn your infant s cues, you know how to care for and feed your baby.   Feeding cues are the licking and smacking of lips, bringing their fist to their mouth, and a reflex called  rooting - where baby turns and opens his or her mouth, searching for the breast or bottle.  Crying is a late feeding cue.  Babies can feed frequently, often at least 8 times in 24 hours.    Breastfeeding facts  Breast milk is the best source of nutrition for your baby and is available at birth. In the first couple of days,  your milk volume is already starting to increase, though it may not be noticeable. Breastfeed frequently to increase your milk supply. Within three to five days, you will begin to notice larger milk volumes. An increase in breast size, heaviness and firmness are often described as the milk  coming in.  Frequent breastfeeding can help breasts from getting overly firm and painful. You will know the baby is getting enough milk if your baby is having wet and dirty diapers and gaining weight.     If your goal is to exclusively breastfeed, it is important to not use any formula or artificial nipples (including bottles and pacifiers) while your baby is learning to breastfeed.  While it may seem like an  easy  option to give your baby a bottle, formula should only be given if there is a medical reason for your baby to have it.      Positioning and attachment   Get comfortable.  Use pillows as needed to support your arms and baby.  Hold baby close at the level of your breast, facing you in a tummy to tummy position.  Skin to skin helps with this.  Position the baby with his or her nose by the nipple.  There should be a straight line from baby s ear to shoulder to hips.  Tickle your baby s lips or wait for baby to open mouth wide, bring baby to breast by leading with the chin.  Aim the nipple at the roof of baby s mouth.  A rapid sucking pattern is followed by longer, drawing pattern with occasional swallows heard.  When baby is correctly latched, your nipple and much of the areola are pulled well into baby s mouth.      Returning to work or school  Focus on a good start to breastfeeding.  Many women continue to provide breastmilk for their baby when they return to work or school.  Making plans about where to pump and store milk can make the transition go well.  Talk with other mothers who have also returned to work or school for tips and support.  Your employer s Human Resource department may be a resource as well.      Returning to work or school: (continued)     babies can mean fewer  sick  days for you.    A quality breast pump will also save time and add comfort.  Check with your insurance prior to giving birth for breast pump coverage.  Many insurance companies include a pump within your benefits.    Wait until your baby is at least three weeks old to introduce a bottle for the first time.  Have someone besides you give the bottle.    Breastfeed when you are with your baby. Reserve your bottles of breast milk for when you are away.     Your breasts will need to be  emptied  either by your baby or a pump.  Plan to pump at least twice in an eight hour day.    If you cannot pump at work, continue breastfeeding at home. Any amount of breast milk is worth giving to your baby.    Formula feeding facts  If you are planning to use formula to feed your baby, you will want to make some preparations ahead of time. Talk to your doctor or nurse-midwife about what type of formula to use. Some are iron-fortified, meaning they have extra iron in them. You will want to purchase formula and bottles before your baby is born to be sure you are ready after you return from the hospital. The [facility] do not provide formula samples to take home.    Be sure to follow formula mixing directions closely. Regular milk in the dairy case at the grocery store should not be given to babies under 1 year old. Baby formula is sold in several forms including:    Ready-to-use. This is the most expensive, but no mixing is necessary.    Concentrated liquid. This is less expensive than ready-to-use and you mix with water.    Powder. This is the least expensive. You mix one level scoop of powdered formula with two ounces of water and stir well.    Most babies need 2.5 ounces of formula per pound of body weight each day. This means an 8-pound baby may drink about 20 ounces of formula a day; however, this is just an estimate. The most important  thing is to pay attention to your baby s cues.  If your baby is always fussy, needs more iron or has certain food allergies, your physician may suggest you change your baby s formula to a different kind.     How do I warm my baby s bottles?  You may feed your baby a bottle without warming it first. It is OK for the breast milk or formula to be cool or room temperature. If your baby seems to prefer it warmed, you can put the filled bottle in a container of warm water and let it stand for a few minutes. Check the temperature of the liquid on your skin before feeding it to your baby; to be sure it isn t too hot. Do not heat bottles in the microwave. Microwaves heat food and liquids unevenly, and this can cause hot spots that can burn your baby.    How do I clean and sterilize bottles?  Sterilize bottles and nipples before you use them for the first time. You can do this by putting them in boiling water for 5 minutes. After that first time, you can wash them in hot and soapy water. Rinse them carefully to be sure there is no soap left on them. You can also wash them in the .    [facility] Connection [phone] ([phone])    Baby Café    Pregnant and interested in breastfeeding?  Need answers to breastfeeding questions?  Want to help breastfeeding moms?  Already breastfeeding and want to meet other moms?    Join us at the Baby Café!    Baby Cafe is a free, drop-in service offering breast-feeding support for pregnant women, breast-feeding mothers and their families.  Come share tips and socialize with other mothers.  Babies and siblings are welcome (no childcare available).    Starting April 2018, Baby Café will be at 4 locations.  Please see below for the Baby Café closest to you!      [facility]  [address] [place] [address]  1st Wednesday: 10am-12pm    [facility]  [address] [place] [address]  3rd Wednesday 4-6pm    [facility]  [address] [place]  "[id number]   10am-12:30pm    [facility]  [address] [place] [address]  : 4-6pm      Hmong, Setswana, and Andorran which is may be available at some sites.    For more information, please contact: Cecily Acosta@[facility]. or [phone]      Virtual Breastfeeding Support:    During this time of isolation, breastfeeding families need even more community!  Here are some area organizations offering virtual support groups for breastfeeding:      [facility] Support Group,  at 10:30 am   Run by JUANITA Bhardwaj of [employer]   Go to [employer] Facebook page and click on \"events\" for link   https://www.SodaStream.com/events/[email or web address]    [facility] Milk Hour,  at 2:30 pm    Run by JUANITA Jones   Go to [facility] FB page and send message to get link   https://www.SodaStream.appEatIT/healthfoundations/    [facility]/[place] holding virtual meetings the first Tuesday of each month, 8-9 pm, and the   Third Saturday, 10 - 11 am.  Go to [facility] [place] FB page; message to get link https://www.SodaStream.appEatIT/LLLofGoldenValley/?hc_location=[facility]    [facility] offers a Lactation Lounge every Friday 12pm - 1pm, run by Negin [facility] Leader   Sign up via link at [employer]/cbe-lactation   https://www.[employer]/cbe-lactation    [facility] is offering virtual support groups every Monday, 10:30 am - 12 pm, run by nurse IBCLC   Https://www.facebook.com/events/[email or web address]/    Prenatal Breastfeeding Classes:        [facility] is offering virtual breastfeeding and  care classes:  https://www.[employer]/education-workshops    BirthEd childbirth and breastfeeding education offering virtual prenatal breastfeeding " classes  https://www.[employer].com/workshops

## 2021-08-24 NOTE — PROGRESS NOTES
Cayla presents to [facility] clinic for her routine PNV 39w2d. Feeling well. Sleeping OK--can nap sometimes.  Anxious for baby to come. Baby active   Reviewed labor instructions and [facility]  Reviewed hx of fast labor.  Asking for sweeping membranes-done  Cx: 1.5/50%/soft/vtx -2 very post  NV 1 wk

## 2021-08-25 ENCOUNTER — PRENATAL OFFICE VISIT (OUTPATIENT)
Dept: MIDWIFE SERVICES | Facility: CLINIC | Age: 31
End: 2021-08-25
Payer: COMMERCIAL

## 2021-08-25 VITALS
DIASTOLIC BLOOD PRESSURE: 66 MMHG | SYSTOLIC BLOOD PRESSURE: 108 MMHG | WEIGHT: 156 LBS | BODY MASS INDEX: 25.07 KG/M2 | HEART RATE: 76 BPM | HEIGHT: 66 IN

## 2021-08-25 DIAGNOSIS — O09.219 HX OF PRECIPITOUS LABOR AND DELIVERIES, ANTEPARTUM: ICD-10-CM

## 2021-08-25 DIAGNOSIS — Z34.80 SUPERVISION OF OTHER NORMAL PREGNANCY, ANTEPARTUM: Primary | ICD-10-CM

## 2021-08-25 DIAGNOSIS — O09.299 HISTORY OF PRE-ECLAMPSIA IN PRIOR PREGNANCY, CURRENTLY PREGNANT: ICD-10-CM

## 2021-08-25 PROCEDURE — 99207 PR PRENATAL VISIT: CPT | Performed by: ADVANCED PRACTICE MIDWIFE

## 2021-08-25 ASSESSMENT — MIFFLIN-ST. JEOR: SCORE: 1439.36

## 2021-08-25 NOTE — PATIENT INSTRUCTIONS
"[facility] Nurse Midwives - Contact information:  Appointment line and to get a hold of CNM in clinic Monday-Friday 8 am - 5 pm:  [phone].  There are some clinics with early start times (1st appointment 7:40 am) and others with evening hours (last appointment 6:20 pm).  Most are typically open from 8 am to 5 pm.    CNM on call answering service: [phone].  Specify your hospital of choice and leave a brief message for CNM;  will then page CNM who is on call at your specified hospital and you should receive a call back with 15 minutes.  Be sure that your ringer is audible and that you can accept blocked calls so that we can get back in touch with you! This number should be reserved for urgent needs if during the day, before 8 am, after 5 pm, weekends, holidays.    Contact the on-call CNM with warning signs, such as:    vaginal bleeding     Vaginal discharge and itching or pain and burning during urination    Leg/calf pain or swelling on one side    severe abdominal pain    nausea and vomiting more than 4-5 times a day, or if you are unable to keep anything down    fever more than 100.4 degrees F.       Meet the Midwives from [facility]  You are invited to an informational meet and greet with [facility] Certified Nurse-Midwives. Our free \"Meet the Midwives\" event is a great opportunity to learn about our midwives' philosophy and experience, the hospitals where we can assist with your birth, and answer questions you may have. Partners, friends, and family are welcome to attend. Currently, this is a virtual event.  Date  First Tuesday of every month at 7 pm.    Link to next (live) meeting  https://www.[place].org/classes-and-events/meet-the-midwives-from-[facility]-clinics  To Join by Telephone (audio only) Call:   [phone] Phone Conference ID: [phone]#          Touring the Maternity Care Center  At this time we are offering a virtual tour of the " Maternity Care Centers at both [facility] and [facility]:   [facility]: https://www.[place].org/Locations/[facility]/Maternity-Care-Center  [employer]:   https://[facility].org/overarchPeter [facility]-care/the-birthplace/tours  https://www.[place].org/Locations/[facility]/Maternity-Care-Center/#virtual_tour  When in person tours become available, registrations is required. To schedule a tour at either [employer] or [facility], please do so online using the following links:  [facility] - https://www.[employer]/registerlist.asp?s=[email or web address]&m=303&vs=5&p=2&dopcu=076&ps=1&group=37&it=1&uwa=941  [facility] - https://www.[employer]/registerlist.asp?s=6&m=303&vs=5&p=2&dposw=823&ps=1&group=38&it=1&ubr=656    Childbirth and Parenting Education:     Free Video Series from [facility]: https://www.nursing.[facility]/laborandbirthvideos  [facility]: http://[facility] HospitalFoodBox.[employer]/   [phone]  [place]: (education, yoga & wellness) www.[employer]  Enlightened Mama: www.NanoVibronixenedmama.[employer]   Childbirth collective: (Parent topic nights)  www.childbirthcollective.org/  Hypnobabies:  www.hypnobabiestwincities.com/  Hypnobirthing:  Http://hypnobirthing.com/  The Birth Hour: https://theWidemilehouTre[employer]/online-childbirth-class/    APPS and Podcasts:   Mary Villagomez  The Birth Hour (for birth stories)   Birthful   Expectful   The Longest Shortest Time  PregnancyPodcast Berenice García    Breastfeeding Information:  An excellent 15-minute video on preparing for a good breastfeeding experience, including how to ensure you have a good milk supply and a comfortable latch, can be found here:  https://firstdroplets.com/      Book Recommendations:   Latoya Mentone's Birthing From Within--first few chapters include a new-age tone, you may prefer to skip it and keep going, because there is good stuff later.  This book recommendation covers emotional preparation, but does cover  "coping with pain, and use of both pharmacological and nonpharmacological methods.    Dr. Pena' The Pregnancy Book and The Birth Book--the pregnancy book goes month-by month    Womanly Art of Breastfeeding by La Leche League International   Breastfeeding by Fariba Ramos--great pictures    Mothering Your Nursing Toddler, by Karine Medrano.   Addresses dealing with so many of the challenging behaviors of a nursing toddler.  How Weaning Happens, by La Leche League.  Discusses weaning at all ages, from medically necessary weaning of an infant, all the way up to age 5 (or older), with why/why not, and strategies.  Very empowering book both for deciding to wean and deciding not to.    American College of Nurse-Midwives (ACNM) http://www.midwife.org/; look at the informational handouts at http://www.midwife.org/Share-With-Women     www.mymidwife.org    Mother to Baby (Medication and Herbal guidance in pregnancy): http://www.mothertobaby.org  Toll-Free Hotline: 992.757.6392  LactMed (Medication use while breastfeeding): http://toxnet.nlm.nih.gov/newtoxnet/lactmed.htm    Women's Health.gov:  http://www.womenshealth.gov/a-z-topics/index.html    American pregnancy association - http://americanpregnancy.org    Centering Pregnancy (group prenatal care option): http://centeringhealthcare.org    Information about doulas:  Childbirth collective: http://www.childbirthcollective.org/  Doulas of [place] (SCOTT):  www.scott.org  [facility]  project: http://twincitiesdoulaproject.com/     Early Childhood and Family Education (ECFE):  ECFE offers parents hands-on learning experiences that will nourish a lifetime of teachable moments.  http://ecfe.info/ecfe-home/    March of Dimes www.WorkWith.me - Nutrition  www.mypyramid.gov  Under \"For Consumers,\" click on \"pregnant and breastfeeding women.\"      Centers for Disease Control and Prevention (CDC) - Vaccines : http://www.cdc.gov/vaccines/       When " researching information on the web, question the validity of websites.  The [place] [place] .gov, .edu and.org tend to be more reliable information.  If there are a lot of advertisements, be cautious of the information provided. Stay away from blogs and chat rooms please!     [place] Screening Program  Http://www.health.[facility].[place].us/newbornscreening/  [place] newborns are tested soon after birth for more than 50 hidden, rare disorders, including hearing loss and critical congenital heart disease (CCHD). This site provides resources and information for families and providers.    HEALTHY PREGNANCY CARE: 37 to 41 WEEKS PREGNANT    Talk with your midwife or physician about when to call with signs of labor    Regular uterine contractions that are getting closer together and/or stronger    If you think your water has broken or is leaking    Bleeding from the vagina like a period (bloody vaginal discharge is normal)    If you are not feeling your baby move    Make plans for transportation and  as needed for when you are going to the hospital.    Your midwife or physician may offer to check your cervix for changes.     Ask your health care provider about vaccinations you may need following delivery. By now, you should have received a Tdap immunization to protect against pertussis or whooping cough. Fathers and family members who will be in close contact with the baby should also receive a Tdap shot at least two weeks before the expected birth of the baby if they have not had a Td (tetanus) shot for at least two years.    If you are past your due date, discuss the next steps leading to delivery with your midwife or physician. If you don't start labor on your own by 41 or 42 weeks, your midwife or physician may recommend giving you medicines to ripen your cervix and start labor.  Induction of labor:  http://onlinelibrary.bergman.com/store/10.1016/j.jmwh.2008.04.018/asset/j.jmwh.2008.04.018.pdf?v=1&t=ejxv9jyz&d=[email or web address]    Preparing for your baby: Tell your midwife or physician how you plan to feed your baby (breast or bottle), who you have chosen to do pediatric care for your baby, and if you have a boy, whether you have chosen to have him circumcised. You will need a car seat correctly installed in your vehicle to bring your baby home. As you start to set up the nursery at home for your baby, make sure the crib is safe. The mattress needs to fit snugly against the edges of the crib. If you can fit a soda can between the bars, they are too far apart and can allow the baby's head to caught between them.    Learn about infant care and feeding, including information about infant CPR. We recommend that you put your baby to sleep on his or her back to reduce the chance of Sudden Infant Death Syndrome (SIDS). To maintain a healthy environment in which your child can grow, it's best to keep your home smoke-free. By preparing ahead, your transition into parenthood will go smoothly for you and your baby.    Your midwife or physician will want to see you for a checkup 2 to 6 weeks after delivery.    If you have questions about any symptoms you are experiencing or any other concerns, call your provider or their clinic staff at [facility] [place] at Dept: [phone]. If it's after clinic hours, physician patients should call the Care Connection at [phone] ([phone]); midwife patients should call their answering service at [phone].    How can you care for yourself at home?   You can refer to the Starting Out Right book or find it online at http://www.[facility]altheast.org/images/stories/maternity/[facility]-Starting-Out-Right.pdf or http://www.healtheast.org/images/stories/flipbooks/[facility]-starting-out-right/[facility]-starting-out-right.html#p=8     You can sign up for a  weekly parenting e-mail that gives support, tips and advice from health care professionals that starts with pregnancy and continues through the toddler years. To register, go to www.[facility].org/baby at any time during your pregnancy.        Making Plans for Feeding My Baby    By this point, you probably have read a lot about feeding your baby.  Breastfeed or formula? Each mother s decision is her own and [facility] respects you and your choices. We ve gathered information on both breastfeeding and formula feeding to help with your decision. Talking with your physician or nurse-midwife can also help in your decision.  However you plan to feed your baby, [facility] Maternity Care Centers encourage rooming in with your baby, skin-to-skin contact and feeding your baby based on his or her cues.    Skin-to-skin contact  Being close to mom helps your baby adjust to life outside of the womb.  It helps your baby regulate their body temperature, heart rate, and breathing.  Your baby will usually be placed skin-to-skin immediately following birth or as soon as possible, if medical intervention is needed.    Rooming-In  Having your baby stay with you in your room is called  rooming-in .  Keeping your baby in your room helps you to learn how to care for your baby by getting to know your baby s cues, body rhythms and sleep cycle.       Cue-based feeding  Cues (signals) are baby s way of telling you what he or she wants.  When you learn your infant s cues, you know how to care for and feed your baby.   Feeding cues are the licking and smacking of lips, bringing their fist to their mouth, and a reflex called  rooting - where baby turns and opens his or her mouth, searching for the breast or bottle.  Crying is a late feeding cue.  Babies can feed frequently, often at least 8 times in 24 hours.    Breastfeeding facts  Breast milk is the best source of nutrition for your baby and is available at birth. In the first couple of days,  your milk volume is already starting to increase, though it may not be noticeable. Breastfeed frequently to increase your milk supply. Within three to five days, you will begin to notice larger milk volumes. An increase in breast size, heaviness and firmness are often described as the milk  coming in.  Frequent breastfeeding can help breasts from getting overly firm and painful. You will know the baby is getting enough milk if your baby is having wet and dirty diapers and gaining weight.     If your goal is to exclusively breastfeed, it is important to not use any formula or artificial nipples (including bottles and pacifiers) while your baby is learning to breastfeed.  While it may seem like an  easy  option to give your baby a bottle, formula should only be given if there is a medical reason for your baby to have it.      Positioning and attachment   Get comfortable.  Use pillows as needed to support your arms and baby.  Hold baby close at the level of your breast, facing you in a tummy to tummy position.  Skin to skin helps with this.  Position the baby with his or her nose by the nipple.  There should be a straight line from baby s ear to shoulder to hips.  Tickle your baby s lips or wait for baby to open mouth wide, bring baby to breast by leading with the chin.  Aim the nipple at the roof of baby s mouth.  A rapid sucking pattern is followed by longer, drawing pattern with occasional swallows heard.  When baby is correctly latched, your nipple and much of the areola are pulled well into baby s mouth.      Returning to work or school  Focus on a good start to breastfeeding.  Many women continue to provide breastmilk for their baby when they return to work or school.  Making plans about where to pump and store milk can make the transition go well.  Talk with other mothers who have also returned to work or school for tips and support.  Your employer s Human Resource department may be a resource as well.      Returning to work or school: (continued)     babies can mean fewer  sick  days for you.    A quality breast pump will also save time and add comfort.  Check with your insurance prior to giving birth for breast pump coverage.  Many insurance companies include a pump within your benefits.    Wait until your baby is at least three weeks old to introduce a bottle for the first time.  Have someone besides you give the bottle.    Breastfeed when you are with your baby. Reserve your bottles of breast milk for when you are away.     Your breasts will need to be  emptied  either by your baby or a pump.  Plan to pump at least twice in an eight hour day.    If you cannot pump at work, continue breastfeeding at home. Any amount of breast milk is worth giving to your baby.    Formula feeding facts  If you are planning to use formula to feed your baby, you will want to make some preparations ahead of time. Talk to your doctor or nurse-midwife about what type of formula to use. Some are iron-fortified, meaning they have extra iron in them. You will want to purchase formula and bottles before your baby is born to be sure you are ready after you return from the hospital. The [facility] do not provide formula samples to take home.    Be sure to follow formula mixing directions closely. Regular milk in the dairy case at the grocery store should not be given to babies under 1 year old. Baby formula is sold in several forms including:    Ready-to-use. This is the most expensive, but no mixing is necessary.    Concentrated liquid. This is less expensive than ready-to-use and you mix with water.    Powder. This is the least expensive. You mix one level scoop of powdered formula with two ounces of water and stir well.    Most babies need 2.5 ounces of formula per pound of body weight each day. This means an 8-pound baby may drink about 20 ounces of formula a day; however, this is just an estimate. The most important  thing is to pay attention to your baby s cues.  If your baby is always fussy, needs more iron or has certain food allergies, your physician may suggest you change your baby s formula to a different kind.     How do I warm my baby s bottles?  You may feed your baby a bottle without warming it first. It is OK for the breast milk or formula to be cool or room temperature. If your baby seems to prefer it warmed, you can put the filled bottle in a container of warm water and let it stand for a few minutes. Check the temperature of the liquid on your skin before feeding it to your baby; to be sure it isn t too hot. Do not heat bottles in the microwave. Microwaves heat food and liquids unevenly, and this can cause hot spots that can burn your baby.    How do I clean and sterilize bottles?  Sterilize bottles and nipples before you use them for the first time. You can do this by putting them in boiling water for 5 minutes. After that first time, you can wash them in hot and soapy water. Rinse them carefully to be sure there is no soap left on them. You can also wash them in the .    [facility] [place] Connection [phone])    Baby Café    Pregnant and interested in breastfeeding?  Need answers to breastfeeding questions?  Want to help breastfeeding moms?  Already breastfeeding and want to meet other moms?    Join us at the Baby Café!    Baby Cafe is a free, drop-in service offering breast-feeding support for pregnant women, breast-feeding mothers and their families.  Come share tips and socialize with other mothers.  Babies and siblings are welcome (no childcare available).    Starting April 2018, Baby Café will be at 4 locations.  Please see below for the Baby Café closest to you!      [facility]  [address] [place] [address]  1st Wednesday: 10am-12pm    [facility]  [address] [place] [address]  3rd Wednesday 4-6pm    [facility]  [address] [place], [place]  "[id number]   10am-12:30pm    [facility]  [address] [place] [address]  : 4-6pm      Hmong, Greek, and Guatemalan which is may be available at some sites.    For more information, please contact: Cecily Acosta@[email or web address]. or [phone]      Virtual Breastfeeding Support:    During this time of isolation, breastfeeding families need even more community!  Here are some area organizations offering virtual support groups for breastfeeding:      [facility] Support Group,  at 10:30 am   Run by JUANITA Bhardwaj of [employer]   Go to [employer] Facebook page and click on \"events\" for link   https://www.[employer].com/events/762397999935567/    [facility] Milk Hour,  at 2:30 pm    Run by JUANITA Jones   Go to [facility] FB page and send message to get link   https://www.[employer]/healthfoundations/    [facility] holding virtual meetings the first Tuesday of each month, 8-9 pm, and the   Third Saturday, 10 - 11 am.  Go to [facility] [place] FB page; message to get link https://www.[employer]/LLLof[place]/?hc_location=[facility]    [facility] offers a Lactation Lounge every Friday 12pm - 1pm, run by [facility] Leader   Sign up via link at [employer]/cbe-lactation   https://www.[employer]/cbe-lactation    [facility] is offering virtual support groups every Monday, 10:30 am - 12 pm, run by nurse IBCLC   Https://www.facebook.com/events/[email or web address]/    Prenatal Breastfeeding Classes:        [facility] is offering virtual breastfeeding and  care classes:  https://www.[employer]/education-workshops    BirthEd childbirth and breastfeeding education offering virtual prenatal breastfeeding " classes  https://www.[employer].com/workshops

## 2021-08-25 NOTE — PROGRESS NOTES
Cayla Lerner is here for HAILEY at 38w2d. Here with partner Davon. Complaint of daily heartburn despite eating smaller frequent meals. Has not tried TUMs yet. Advised on taking, timing, and diet modifications/avoidance of triggering foods. Discussed option to try another medication (sucralfate or prilosec) if TUMS does not help. Thinks she would pass on this however and just wait it out. Desired cervix check today - no appreciable change from previous exam 2 weeks ago. Patient may be interested in membrane sweep at next visit.    Fetal movement is active.  Advised on iron recommendations but declines to supplement.  TW.3 kg (36 lb)    Other two babies came close to 40 weeks GA.  Reviewed third trimester warning signs and labor precautions in consideration of hx of fast labors and when to call. Feels comfortable with her plan for childcare and when to call with labor signs.  RTC 1 week.

## 2021-09-01 ENCOUNTER — PRENATAL OFFICE VISIT (OUTPATIENT)
Dept: MIDWIFE SERVICES | Facility: CLINIC | Age: 31
End: 2021-09-01
Payer: COMMERCIAL

## 2021-09-01 VITALS
WEIGHT: 156 LBS | DIASTOLIC BLOOD PRESSURE: 75 MMHG | BODY MASS INDEX: 25.07 KG/M2 | SYSTOLIC BLOOD PRESSURE: 116 MMHG | HEART RATE: 89 BPM | HEIGHT: 66 IN

## 2021-09-01 DIAGNOSIS — Z34.80 SUPERVISION OF OTHER NORMAL PREGNANCY, ANTEPARTUM: ICD-10-CM

## 2021-09-01 PROCEDURE — 99207 PR PRENATAL VISIT: CPT | Performed by: MIDWIFE

## 2021-09-01 ASSESSMENT — MIFFLIN-ST. JEOR: SCORE: 1439.36

## 2021-09-08 ENCOUNTER — PRENATAL OFFICE VISIT (OUTPATIENT)
Dept: MIDWIFE SERVICES | Facility: CLINIC | Age: 31
End: 2021-09-08
Payer: COMMERCIAL

## 2021-09-08 VITALS
HEART RATE: 80 BPM | SYSTOLIC BLOOD PRESSURE: 110 MMHG | HEIGHT: 66 IN | DIASTOLIC BLOOD PRESSURE: 70 MMHG | BODY MASS INDEX: 25.55 KG/M2 | WEIGHT: 159 LBS

## 2021-09-08 DIAGNOSIS — O48.0 POST-TERM PREGNANCY, 40-42 WEEKS OF GESTATION: ICD-10-CM

## 2021-09-08 DIAGNOSIS — Z34.80 SUPERVISION OF OTHER NORMAL PREGNANCY, ANTEPARTUM: Primary | ICD-10-CM

## 2021-09-08 PROCEDURE — 99207 PR PRENATAL VISIT: CPT | Performed by: MIDWIFE

## 2021-09-08 ASSESSMENT — MIFFLIN-ST. JEOR: SCORE: 1452.97

## 2021-09-08 NOTE — PROGRESS NOTES
Cayla is here with Davon for her routine prenatal appt at 40w2d gestation. She feels ready for baby to come soon, requests membrane stripping and hopes to avoid IOL Discussed post dates testing and pr will self schedule a BPP and NST with appt next week if needed. Membranes swept with exam today after discussed risks and benefits of procedure. Enc to rest and stay hydrated, call prn SROM or active labor. Reviewed how to reach CNM with non-urgent and urgent concerns between visits.

## 2021-09-09 ENCOUNTER — HOSPITAL ENCOUNTER (INPATIENT)
Facility: CLINIC | Age: 31
LOS: 1 days | Discharge: HOME | End: 2021-09-10
Attending: MIDWIFE | Admitting: MIDWIFE
Payer: COMMERCIAL

## 2021-09-09 PROBLEM — Z37.9 NORMAL LABOR: Status: RESOLVED | Noted: 2021-09-09 | Resolved: 2021-09-09

## 2021-09-09 PROBLEM — Z37.9 NORMAL LABOR: Status: ACTIVE | Noted: 2021-09-09

## 2021-09-09 PROBLEM — O09.219 HX OF PRECIPITOUS LABOR AND DELIVERIES, ANTEPARTUM: Status: RESOLVED | Noted: 2021-08-18 | Resolved: 2021-09-09

## 2021-09-09 PROBLEM — O09.299 HISTORY OF PRE-ECLAMPSIA IN PRIOR PREGNANCY, CURRENTLY PREGNANT: Status: RESOLVED | Noted: 2021-04-06 | Resolved: 2021-09-09

## 2021-09-09 PROBLEM — O21.9 NAUSEA AND VOMITING OF PREGNANCY, ANTEPARTUM: Status: RESOLVED | Noted: 2021-02-01 | Resolved: 2021-09-09

## 2021-09-09 PROBLEM — Z34.80 SUPERVISION OF OTHER NORMAL PREGNANCY, ANTEPARTUM: Status: RESOLVED | Noted: 2021-02-13 | Resolved: 2021-09-09

## 2021-09-09 LAB
ABO/RH(D): NORMAL
ANTIBODY SCREEN: NEGATIVE
BASOPHILS # BLD AUTO: 0 10E3/UL (ref 0–0.2)
BASOPHILS NFR BLD AUTO: 0 %
EOSINOPHIL # BLD AUTO: 0 10E3/UL (ref 0–0.7)
EOSINOPHIL NFR BLD AUTO: 0 %
ERYTHROCYTE [DISTWIDTH] IN BLOOD BY AUTOMATED COUNT: 12.9 % (ref 10–15)
HCT VFR BLD AUTO: 36.3 % (ref 35–47)
HGB BLD-MCNC: 11.9 G/DL (ref 11.7–15.7)
IMM GRANULOCYTES # BLD: 0.1 10E3/UL
IMM GRANULOCYTES NFR BLD: 1 %
LYMPHOCYTES # BLD AUTO: 1.4 10E3/UL (ref 0.8–5.3)
LYMPHOCYTES NFR BLD AUTO: 18 %
MCH RBC QN AUTO: 28.5 PG (ref 26.5–33)
MCHC RBC AUTO-ENTMCNC: 32.8 G/DL (ref 31.5–36.5)
MCV RBC AUTO: 87 FL (ref 78–100)
MONOCYTES # BLD AUTO: 0.6 10E3/UL (ref 0–1.3)
MONOCYTES NFR BLD AUTO: 8 %
NEUTROPHILS # BLD AUTO: 5.7 10E3/UL (ref 1.6–8.3)
NEUTROPHILS NFR BLD AUTO: 73 %
NRBC # BLD AUTO: 0 10E3/UL
NRBC BLD AUTO-RTO: 0 /100
PLATELET # BLD AUTO: 154 10E3/UL (ref 150–450)
RBC # BLD AUTO: 4.18 10E6/UL (ref 3.8–5.2)
SARS-COV-2 RNA RESP QL NAA+PROBE: NEGATIVE
SPECIMEN EXPIRATION DATE: NORMAL
WBC # BLD AUTO: 7.8 10E3/UL (ref 4–11)

## 2021-09-09 PROCEDURE — 36415 COLL VENOUS BLD VENIPUNCTURE: CPT | Performed by: MIDWIFE

## 2021-09-09 PROCEDURE — 86900 BLOOD TYPING SEROLOGIC ABO: CPT | Performed by: MIDWIFE

## 2021-09-09 PROCEDURE — 59400 OBSTETRICAL CARE: CPT | Performed by: MIDWIFE

## 2021-09-09 PROCEDURE — 250N000013 HC RX MED GY IP 250 OP 250 PS 637: Performed by: MIDWIFE

## 2021-09-09 PROCEDURE — C9803 HOPD COVID-19 SPEC COLLECT: HCPCS

## 2021-09-09 PROCEDURE — 10907ZC DRAINAGE OF AMNIOTIC FLUID, THERAPEUTIC FROM PRODUCTS OF CONCEPTION, VIA NATURAL OR ARTIFICIAL OPENING: ICD-10-PCS | Performed by: MIDWIFE

## 2021-09-09 PROCEDURE — 87635 SARS-COV-2 COVID-19 AMP PRB: CPT | Performed by: MIDWIFE

## 2021-09-09 PROCEDURE — 120N000001 HC R&B MED SURG/OB

## 2021-09-09 PROCEDURE — 722N000001 HC LABOR CARE VAGINAL DELIVERY SINGLE

## 2021-09-09 PROCEDURE — 85025 COMPLETE CBC W/AUTO DIFF WBC: CPT | Performed by: MIDWIFE

## 2021-09-09 RX ORDER — METOCLOPRAMIDE HYDROCHLORIDE 5 MG/ML
10 INJECTION INTRAMUSCULAR; INTRAVENOUS EVERY 6 HOURS PRN
Status: DISCONTINUED | OUTPATIENT
Start: 2021-09-09 | End: 2021-09-09 | Stop reason: HOSPADM

## 2021-09-09 RX ORDER — PROCHLORPERAZINE 25 MG
25 SUPPOSITORY, RECTAL RECTAL EVERY 12 HOURS PRN
Status: DISCONTINUED | OUTPATIENT
Start: 2021-09-09 | End: 2021-09-09 | Stop reason: HOSPADM

## 2021-09-09 RX ORDER — IBUPROFEN 800 MG/1
800 TABLET, FILM COATED ORAL EVERY 6 HOURS PRN
Status: DISCONTINUED | OUTPATIENT
Start: 2021-09-09 | End: 2021-09-10 | Stop reason: HOSPADM

## 2021-09-09 RX ORDER — OXYTOCIN 10 [USP'U]/ML
10 INJECTION, SOLUTION INTRAMUSCULAR; INTRAVENOUS
Status: DISCONTINUED | OUTPATIENT
Start: 2021-09-09 | End: 2021-09-10 | Stop reason: HOSPADM

## 2021-09-09 RX ORDER — NALOXONE HYDROCHLORIDE 0.4 MG/ML
0.4 INJECTION, SOLUTION INTRAMUSCULAR; INTRAVENOUS; SUBCUTANEOUS
Status: DISCONTINUED | OUTPATIENT
Start: 2021-09-09 | End: 2021-09-09 | Stop reason: HOSPADM

## 2021-09-09 RX ORDER — NALOXONE HYDROCHLORIDE 0.4 MG/ML
0.2 INJECTION, SOLUTION INTRAMUSCULAR; INTRAVENOUS; SUBCUTANEOUS
Status: DISCONTINUED | OUTPATIENT
Start: 2021-09-09 | End: 2021-09-09 | Stop reason: HOSPADM

## 2021-09-09 RX ORDER — KETOROLAC TROMETHAMINE 30 MG/ML
30 INJECTION, SOLUTION INTRAMUSCULAR; INTRAVENOUS
Status: DISCONTINUED | OUTPATIENT
Start: 2021-09-09 | End: 2021-09-10 | Stop reason: HOSPADM

## 2021-09-09 RX ORDER — OXYTOCIN/0.9 % SODIUM CHLORIDE 30/500 ML
340 PLASTIC BAG, INJECTION (ML) INTRAVENOUS CONTINUOUS PRN
Status: DISCONTINUED | OUTPATIENT
Start: 2021-09-09 | End: 2021-09-09 | Stop reason: HOSPADM

## 2021-09-09 RX ORDER — METOCLOPRAMIDE 10 MG/1
10 TABLET ORAL EVERY 6 HOURS PRN
Status: DISCONTINUED | OUTPATIENT
Start: 2021-09-09 | End: 2021-09-09 | Stop reason: HOSPADM

## 2021-09-09 RX ORDER — OXYTOCIN 10 [USP'U]/ML
10 INJECTION, SOLUTION INTRAMUSCULAR; INTRAVENOUS
Status: DISCONTINUED | OUTPATIENT
Start: 2021-09-09 | End: 2021-09-09 | Stop reason: HOSPADM

## 2021-09-09 RX ORDER — CARBOPROST TROMETHAMINE 250 UG/ML
250 INJECTION, SOLUTION INTRAMUSCULAR
Status: DISCONTINUED | OUTPATIENT
Start: 2021-09-09 | End: 2021-09-09 | Stop reason: HOSPADM

## 2021-09-09 RX ORDER — METHYLERGONOVINE MALEATE 0.2 MG/ML
200 INJECTION INTRAVENOUS
Status: DISCONTINUED | OUTPATIENT
Start: 2021-09-09 | End: 2021-09-09 | Stop reason: HOSPADM

## 2021-09-09 RX ORDER — ONDANSETRON 2 MG/ML
4 INJECTION INTRAMUSCULAR; INTRAVENOUS EVERY 6 HOURS PRN
Status: DISCONTINUED | OUTPATIENT
Start: 2021-09-09 | End: 2021-09-09 | Stop reason: HOSPADM

## 2021-09-09 RX ORDER — ONDANSETRON 4 MG/1
4 TABLET, ORALLY DISINTEGRATING ORAL EVERY 6 HOURS PRN
Status: DISCONTINUED | OUTPATIENT
Start: 2021-09-09 | End: 2021-09-09 | Stop reason: HOSPADM

## 2021-09-09 RX ORDER — MODIFIED LANOLIN
OINTMENT (GRAM) TOPICAL
Status: DISCONTINUED | OUTPATIENT
Start: 2021-09-09 | End: 2021-09-10 | Stop reason: HOSPADM

## 2021-09-09 RX ORDER — ACETAMINOPHEN 325 MG/1
650 TABLET ORAL EVERY 4 HOURS PRN
Status: DISCONTINUED | OUTPATIENT
Start: 2021-09-09 | End: 2021-09-10 | Stop reason: HOSPADM

## 2021-09-09 RX ORDER — HYDROCORTISONE 2.5 %
CREAM (GRAM) TOPICAL 3 TIMES DAILY PRN
Status: DISCONTINUED | OUTPATIENT
Start: 2021-09-09 | End: 2021-09-10 | Stop reason: HOSPADM

## 2021-09-09 RX ORDER — DOCUSATE SODIUM 100 MG/1
100 CAPSULE, LIQUID FILLED ORAL DAILY
Status: DISCONTINUED | OUTPATIENT
Start: 2021-09-09 | End: 2021-09-10 | Stop reason: HOSPADM

## 2021-09-09 RX ORDER — OXYTOCIN/0.9 % SODIUM CHLORIDE 30/500 ML
100-340 PLASTIC BAG, INJECTION (ML) INTRAVENOUS CONTINUOUS PRN
Status: DISCONTINUED | OUTPATIENT
Start: 2021-09-09 | End: 2021-09-10 | Stop reason: HOSPADM

## 2021-09-09 RX ORDER — PROCHLORPERAZINE MALEATE 10 MG
10 TABLET ORAL EVERY 6 HOURS PRN
Status: DISCONTINUED | OUTPATIENT
Start: 2021-09-09 | End: 2021-09-09 | Stop reason: HOSPADM

## 2021-09-09 RX ORDER — MISOPROSTOL 200 UG/1
400 TABLET ORAL
Status: DISCONTINUED | OUTPATIENT
Start: 2021-09-09 | End: 2021-09-09 | Stop reason: HOSPADM

## 2021-09-09 RX ORDER — BISACODYL 10 MG
10 SUPPOSITORY, RECTAL RECTAL DAILY PRN
Status: DISCONTINUED | OUTPATIENT
Start: 2021-09-09 | End: 2021-09-10 | Stop reason: HOSPADM

## 2021-09-09 RX ORDER — IBUPROFEN 600 MG/1
600 TABLET, FILM COATED ORAL
Status: DISCONTINUED | OUTPATIENT
Start: 2021-09-09 | End: 2021-09-10 | Stop reason: HOSPADM

## 2021-09-09 RX ORDER — MISOPROSTOL 200 UG/1
800 TABLET ORAL
Status: DISCONTINUED | OUTPATIENT
Start: 2021-09-09 | End: 2021-09-09 | Stop reason: HOSPADM

## 2021-09-09 RX ADMIN — DOCUSATE SODIUM 100 MG: 100 CAPSULE, LIQUID FILLED ORAL at 22:09

## 2021-09-09 RX ADMIN — IBUPROFEN 800 MG: 800 TABLET, FILM COATED ORAL at 22:09

## 2021-09-09 RX ADMIN — IBUPROFEN 800 MG: 800 TABLET, FILM COATED ORAL at 15:27

## 2021-09-09 RX ADMIN — IBUPROFEN 600 MG: 600 TABLET, FILM COATED ORAL at 09:04

## 2021-09-09 ASSESSMENT — ACTIVITIES OF DAILY LIVING (ADL)
HEARING_DIFFICULTY_OR_DEAF: NO
WALKING_OR_CLIMBING_STAIRS_DIFFICULTY: NO
FALL_HISTORY_WITHIN_LAST_SIX_MONTHS: NO
DOING_ERRANDS_INDEPENDENTLY_DIFFICULTY: NO
DIFFICULTY_EATING/SWALLOWING: NO
PATIENT_/_FAMILY_COMMUNICATION_STYLE: SPOKEN LANGUAGE (ENGLISH OR BILINGUAL)
DRESSING/BATHING_DIFFICULTY: NO
DIFFICULTY_COMMUNICATING: NO
TOILETING_ISSUES: NO
WEAR_GLASSES_OR_BLIND: NO
CONCENTRATING,_REMEMBERING_OR_MAKING_DECISIONS_DIFFICULTY: NO

## 2021-09-09 NOTE — L&D DELIVERY NOTE
OB Vaginal Delivery Note    Cayla Lerner MRN# [id number]   Age: 31 year old YOB: 1990       GA: 40w3d  GP:   Labor Complications: Dysfunctional Labor   EBL:   mL  Delivery QBL: 297 mL  Delivery Type: Vaginal, Spontaneous   ROM to Delivery Time: (Delivered) Hours: 1 Minutes: 29   Weight:     1 Minute 5 Minute 10 Minute   Apgar Totals: 9   9        RONNELL PEARSON;NAIMA RANKIN     Delivery Details:  Cayla Lerner, a 31 year old  female delivered a viable infant with apgars of 9  and 9 . Patient was fully dilated and pushing after 4   hours 13   minutes in active labor. Delivery was via vaginal, spontaneous  to a sterile field under none  anesthesia. Infant delivered in  BEATRICE position and immediately restituted to OP       position. Anterior and posterior shoulders delivered without difficulty. The cord was clamped, cut twice and 3 vessels  were noted. Cord blood was obtained in routine fashion with the following disposition: discard .      Cord complications: none   Placenta delivered at 2021  8:48 AM . Placental disposition was Hospital disposal . Fundal massage performed and fundus found to be firm.     Episiotomy: none    Perineum, vagina, cervix were inspected, and the following lacerations were noted:   Perineal lacerations: none                Excellent hemostasis was noted. Needle count correct. Infant and patient in delivery room in good and stable condition.        Omar Lerner-Cayla [[id number]]    Labor Event Times    Labor onset date: 21 Onset time:  4:22 AM   Dilation complete date: 21 Complete time:  8:35 AM   Start pushing date/time: 2021 0838      Labor Events     labor?: No   steroids: None  Labor Type: Spontaneous  Predominate monitoring during 1st stage: intermittent auscultation     Antibiotics received during labor?: No     Rupture date/time: 21 0713   Rupture type: Artificial Rupture of Membranes  Fluid color:  Clear  Fluid odor: Normal     Augmentation: AROM  Indications for augmentation: Ineffective Contraction Pattern  1:1 continuous labor support provided by?: RN       Delivery/Placenta Date and Time    Delivery Date: 21 Delivery Time:  8:42 AM   Placenta Date/Time: 2021  8:48 AM  Delivering clinician: Larissa Guerrero CNM   Other personnel present at delivery:  Provider Role   Medina Cazares RN Merilatt, Dawn L, RN          Vaginal Counts          Needles Suture Needles Sponges (RETIRED) Instruments   Initial counts       Added to count       Relief counts       Final counts             Placed during labor Accounted for at the end of labor   FSE NA    IUPC NA    Cervadil NA                      Apgars    Living status: Living   1 Minute 5 Minute 10 Minute 15 Minute 20 Minute   Skin color: 1  1       Heart rate: 2  2       Reflex irritability: 2  2       Muscle tone: 2  2       Respiratory effort: 2  2       Total: 9  9       Apgars assigned by: LUIS FELIPE CAZARES     Cord    Vessels: 3 Vessels    Cord Complications: None               Cord Blood Disposition: Discard    Gases Sent?: No    Delayed cord clamping?: Yes    Cord Clamping Delay (seconds):  seconds    Stem cell collection?: No       Cecil Resuscitation    Methods: Suctioning  Cecil Care at Delivery: Bulb Suction of nasal passageways and mouth  Output in Delivery Room: Voided, Stool     Cecil Measurements    Output in delivery room: Voided, Stool     Skin to Skin and Feeding Plan    Skin to skin initiation date/time: 1841    Skin to skin with: Mother  Skin to skin end date/time:        Labor Events and Shoulder Dystocia    Fetal Tracing Prior to Delivery: Category 1  Shoulder dystocia present?: Neg     Delivery (Maternal) (Provider to Complete) ([id number])    Episiotomy: None  Perineal lacerations: None    Repair suture: None  Genital tract inspection done: Pos     Blood Loss  Mother: ZAHRA Lerner #[id number]   Start of Mother's Information     Delivery Blood Loss  09/09/21 0422 - 09/09/21 0934    Delivery QBL (mL) Hospital Encounter 297 mL    Total  297 mL         End of Mother's Information  Mother: ZAHRA Lerner #[id number]          Delivery - Provider to Complete ([id number])    Delivering clinician: Larissa Guerrero CNM CNM Care: Exclusive CNM care in labor  Delivery Type (Choose the 1 that will go to the Birth History): Vaginal, Spontaneous                   Other personnel:  Provider Role   Medina Cazares RN Merilatt, Dawn L, RN                 Placenta    Date/Time: 9/9/2021  8:48 AM  Removal: Spontaneous  Disposition: Hospital disposal           Anesthesia    Method: None                       Larissa Guerrero DNP,APRN,CNM

## 2021-09-09 NOTE — H&P
Date: 2021  Time: 2:09 AM    Admission H&P  Cayla Lerner,  1990, MRN [id number]    Facility: [facility]  Normal labor [O80, Z37.9]    PCP: No Ref-Primary, Physician, None          Extended Emergency Contact Information  Primary Emergency Contact: Davon Lerner  Address: [address] [place] [address] [place]  Home Phone: [phone]  Relation: Spouse       Chief Complaint: Normal labor        HPI:      Cayla Lerner, is a 31 year old,  at 40w3d, LMP 10/30/20, Estimated Date of Delivery: Sep 6, 2021, confirmed by ultrasound at 19 weeks, admitted to [facility] on 2021 at 2am secondary to: onset of contractions @ midnight and hx of rapid labors. Patient does not have a birth plan, but plans for another natural delivery and will let us know if that changes. Well supported by her partner Daovn.     Prenatal History:  Cayla Lerner began care with [facility]wives at the [facility] on 21 at 10 3/7 weeks gestation with regular care thereafter for a total of 12 visits. Her care was complicated by hx of gestational hypertension and preeclapsia, echogenic focus in fetal heart and bowel, NIPT normal, declined MFM referral, hx of precipitous deliveries.     Pre-pregnant weight:  120 lbs   Pre-pregnant BMI: 19.38     Total weight gain:  39 lbs    Labs:  Orders Only on 2021   Component Date Value Ref Range Status     Hemoglobin 2021 11.5* 11.7 - 15.7 g/dL Final   Prenatal Office Visit on 2021   Component Date Value Ref Range Status     Group B Strep PCR 2021 Negative  Negative Final    Presumed negative for Streptococcus agalactiae (Group B Streptococcus) or the number of organisms may be below the limit of detection of the assay.     Penicillin, amoxicillin, or cephal* 2021 No   Final   [facility] 2021   Component Date Value Ref Range Status     Treponema Antibody Total  06/18/2021 Negative  Negative Final     Hemoglobin 06/18/2021 13.2  12.0 - 16.0 g/dL Final     Glu Gest Screen 1hr 50g 06/18/2021 127  70 - 139 mg/dL Final     Patient Fasting > 8hrs? 06/18/2021 No   Final     Sodium 06/18/2021 133* 136 - 145 mmol/L Final     Potassium 06/18/2021 3.7  3.5 - 5.0 mmol/L Final     Chloride 06/18/2021 102  98 - 107 mmol/L Final     Carbon Dioxide (CO2) 06/18/2021 24  22 - 31 mmol/L Final     Anion Gap 06/18/2021 7  5 - 18 mmol/L Final     Glucose 06/18/2021 140* 70 - 125 mg/dL Final     Urea Nitrogen 06/18/2021 8  8 - 22 mg/dL Final     Creatinine 06/18/2021 0.60  0.60 - 1.10 mg/dL Final     GFR Estimate If Black 06/18/2021 >60  >60 mL/min/1.73m2 Final     GFR Estimate 06/18/2021 >60  >60 mL/min/1.73m2 Final     Bilirubin Total 06/18/2021 0.3  0.0 - 1.0 mg/dL Final     Calcium 06/18/2021 7.8* 8.5 - 10.5 mg/dL Final     Protein Total 06/18/2021 5.4* 6.0 - 8.0 g/dL Final     Albumin 06/18/2021 2.8* 3.5 - 5.0 g/dL Final     Alkaline Phosphatase 06/18/2021 50  45 - 120 U/L Final     AST 06/18/2021 14  0 - 40 U/L Final     ALT 06/18/2021 18  0 - 45 U/L Final   Prenatal [facility] Visit - [facility] on 02/11/2021   Component Date Value Ref Range Status     ABO/RH(D) 02/11/2021 A POS   Final     ABORH REPEAT 02/11/2021 A POS   Final     Antibody Screen 02/11/2021 Negative   Final     Rubella Antibody IgG 02/11/2021 Positive   Final     WBC 02/11/2021 6.6  4.0 - 11.0 thou/uL Final     RBC Count 02/11/2021 4.15  3.80 - 5.40 mill/uL Final     Hemoglobin 02/11/2021 12.6  12.0 - 16.0 g/dL Final     Hematocrit 02/11/2021 35.7  35.0 - 47.0 % Final     MCV 02/11/2021 86  80 - 100 fL Final     MCH 02/11/2021 30.4  27.0 - 34.0 pg Final     MCHC 02/11/2021 35.3  32.0 - 36.0 g/dL Final     RDW 02/11/2021 12.4  11.0 - 14.5 % Final     Platelet Count 02/11/2021 231  140 - 440 thou/uL Final     Mean Platelet Volume 02/11/2021 9.9  8.5 - 12.5 fL Final     % Neutrophils 02/11/2021 70  50 - 70 % Final     %  Lymphocytes 02/11/2021 21  20 - 40 % Final     % Monocytes 02/11/2021 8  2 - 10 % Final     % Eosinophils 02/11/2021 1  0 - 6 % Final     % Basophils 02/11/2021 0  0 - 2 % Final     % Immature Granulocytes 02/11/2021 1* <=0 % Final     Absolute Neutrophils 02/11/2021 4.7  2.0 - 7.7 thou/uL Final     Absolute Lymphocytes 02/11/2021 1.4  0.8 - 4.4 thou/uL Final     Absolute Monocytes 02/11/2021 0.5  0.0 - 0.9 thou/uL Final     Eosinophils Absolute 02/11/2021 0.0  0.0 - 0.4 thou/uL Final     Absolute Basophils 02/11/2021 0.0  0.0 - 0.2 thou/uL Final     Absolute Immature Granulocytes 02/11/2021 0.0  <=0.0 thou/uL Final     Sodium 02/11/2021 136  136 - 145 mmol/L Final     Potassium 02/11/2021 3.8  3.5 - 5.0 mmol/L Final     Chloride 02/11/2021 104  98 - 107 mmol/L Final     Carbon Dioxide (CO2) 02/11/2021 25  22 - 31 mmol/L Final     Anion Gap 02/11/2021 7  5 - 18 mmol/L Final     Glucose 02/11/2021 89  70 - 125 mg/dL Final     Urea Nitrogen 02/11/2021 10  8 - 22 mg/dL Final     Creatinine 02/11/2021 0.58* 0.60 - 1.10 mg/dL Final     GFR Estimate If Black 02/11/2021 >60  >60 mL/min/1.73m2 Final     GFR Estimate 02/11/2021 >60  >60 mL/min/1.73m2 Final     Bilirubin Total 02/11/2021 0.3  0.0 - 1.0 mg/dL Final     Calcium 02/11/2021 8.7  8.5 - 10.5 mg/dL Final     Protein Total 02/11/2021 5.8* 6.0 - 8.0 g/dL Final     Albumin 02/11/2021 3.7  3.5 - 5.0 g/dL Final     Alkaline Phosphatase 02/11/2021 22* 45 - 120 U/L Final     AST 02/11/2021 11  0 - 40 U/L Final     ALT 02/11/2021 15  0 - 45 U/L Final     Culture 02/11/2021 No Growth   Final     HIV Antigen Antibody Combo 02/11/2021 Negative  Negative Final     HIV-1 RNA copies/mL 02/11/2021 HIV-1 RNA Not Detected  HIVND [Copies]/mL Final    Comment: The LUIS AmpliPrep/LUIS TaqMan HIV-1 test is an FDA-approved in vitro nucleic  acid amplification test for the quantitation of HIV-1 RNA in human plasma (EDTA  plasma) using the LUIS AmpliPrep instrument for automated viral  nucleic acid  extraction and the LUIS TaqMan Analyzer or LUIS TaqMan for automated Real  Time PCR amplification and detection of the viral nucleic acid target.  Titer results are reported in copies/ml. This assay is intended for use in  conjunction with clinical presentation and other laboratory markers of disease  prognosis and for use as an aid in assessing viral response to antiretroviral  treatment as measured by changes in plasma HIV-1 RNA levels. This test should  not be used as a donor screening test to confirm the presence of HIV-1  infection.       HIV-1 log 02/11/2021 Not Calculated  <1.3 [Log_copies]/mL Final    Comment: Performed and/or entered by:  [facility]  [address] [place] [id number]       Hepatitis B Surface Antigen 02/11/2021 Negative  Negative Final     Treponema Antibody Total 02/11/2021 Negative  Negative Final   Office Visit on 02/01/2021   Component Date Value Ref Range Status     Color Urine 02/01/2021 Yellow   Final     Appearance Urine 02/01/2021 Slightly Cloudy   Final     Glucose Urine 02/01/2021 Negative  NEG^Negative mg/dL Final     Bilirubin Urine 02/01/2021 Negative  NEG^Negative Final     Ketones Urine 02/01/2021 15* NEG^Negative mg/dL Final     Specific Gravity Urine 02/01/2021 1.020  1.003 - 1.035 Final     pH Urine 02/01/2021 6.5  5.0 - 7.0 pH Final     Protein Albumin Urine 02/01/2021 Negative  NEG^Negative mg/dL Final     Urobilinogen Urine 02/01/2021 2.0* 0.2 - 1.0 EU/dL Final     Nitrite Urine 02/01/2021 Negative  NEG^Negative Final     Blood Urine 02/01/2021 Negative  NEG^Negative Final     Leukocyte Esterase Urine 02/01/2021 Small* NEG^Negative Final     Source 02/01/2021 Midstream Urine   Final     WBC Urine 02/01/2021 5-10* OTO5^0 - 5 /HPF Final     RBC Urine 02/01/2021 O - 2  OTO2^O - 2 /HPF Final     Squamous Epithelial /LPF Urine 02/01/2021 Few  FEW^Few /LPF Final     Bacteria Urine 02/01/2021 Many* NEG^Negative /HPF Final      Sodium 02/01/2021 135  133 - 144 mmol/L Final    Testing performed on Sandhya at [facility] lab.       Potassium 02/01/2021 4.0  3.4 - 5.3 mmol/L Final     Chloride 02/01/2021 102  94 - 109 mmol/L Final     Carbon Dioxide 02/01/2021 28  20 - 32 mmol/L Final     Anion Gap 02/01/2021 5* 6 - 17 mmol/L Final     Glucose 02/01/2021 91  70 - 99 mg/dL Final     Urea Nitrogen 02/01/2021 9  7 - 30 mg/dL Final     Creatinine 02/01/2021 0.60  0.52 - 1.04 mg/dL Final     GFR Estimate 02/01/2021 >90  >60 mL/min/[1.73_m2] Final    Comment: Starting 12/18/2018, serum creatinine based estimated GFR (eGFR) will be   calculated using the Chronic Kidney Disease Epidemiology Collaboration   (CKD-EPI) equation.       GFR Estimate If Black 02/01/2021 >90  >60 mL/min/[1.73_m2] Final    Comment: Starting 12/18/2018, serum creatinine based estimated GFR (eGFR) will be   calculated using the Chronic Kidney Disease Epidemiology Collaboration   (CKD-EPI) equation.       Calcium 02/01/2021 9.4  8.5 - 10.1 mg/dL Final     WBC 02/01/2021 7.4  4.0 - 11.0 10e9/L Final     RBC Count 02/01/2021 4.85  3.8 - 5.2 10e12/L Final     Hemoglobin 02/01/2021 14.5  11.7 - 15.7 g/dL Final     Hematocrit 02/01/2021 41.0  35.0 - 47.0 % Final     MCV 02/01/2021 85  78 - 100 fl Final     MCH 02/01/2021 29.9  26.5 - 33.0 pg Final     MCHC 02/01/2021 35.4  31.5 - 36.5 g/dL Final     RDW 02/01/2021 11.7  10.0 - 15.0 % Final     Platelet Count 02/01/2021 267  150 - 450 10e9/L Final     % Neutrophils 02/01/2021 72.9  % Final     % Lymphocytes 02/01/2021 20.4  % Final     % Monocytes 02/01/2021 6.1  % Final     % Eosinophils 02/01/2021 0.3  % Final     % Basophils 02/01/2021 0.3  % Final     Absolute Neutrophil 02/01/2021 5.4  1.6 - 8.3 10e9/L Final     Absolute Lymphocytes 02/01/2021 1.5  0.8 - 5.3 10e9/L Final     Absolute Monocytes 02/01/2021 0.5  0.0 - 1.3 10e9/L Final     Absolute Eosinophils 02/01/2021 0.0  0.0 - 0.7 10e9/L Final     Absolute  Basophils 2021 0.0  0.0 - 0.2 10e9/L Final     Diff Method 2021 Automated Method   Final     Specimen Description 2021 Midstream Urine   Final     Special Requests 2021 Specimen received in preservative   Final     Culture Micro 2021    Final                    Value:<10,000 colonies/mL  mixed urogenital mariah         Pertinent Radiology:  Please see imagining tab for details. Reviewed upon admission. Placenta posterior without previa    OB HISTORY  OB History    Para Term  AB Living   3 2 2 0 0 2   SAB TAB Ectopic Multiple Live Births   0 0 0 1 2      # Outcome Date GA Lbr Sixto/2nd Weight Sex Delivery Anes PTL Lv   3 Current            2 Term 19 39w6d 01:24  00:07 3 kg (6 lb 9.8 oz) F Vag-Spont None N TERRY      Name: Florina      Apgar1: 9  Apgar5: 9   1A Term 17 40w1d  3.345 kg (7 lb 6 oz) F Vag-Spont Nitrous  TERRY      Birth Comments: 20 min 2nd stage      Name: Sonya      Apgar1: 9  Apgar5: 9   1B                   Medical History  Past Medical History:   Diagnosis Date     Acute superficial venous thrombosis of lower extremity 2017    with varicose vein in pregnancy     Headache      Hx of varicella     had chicken pox as a child             Surgical History  She  has a past surgical history that includes [place] Tooth Extraction.       Social History  Reviewed, and she  reports that she has never smoked. She has never used smokeless tobacco. She reports that she does not drink alcohol and does not use drugs.  Partner: Davon  Education level: college graduate  Occupation: RN/Admin      Family History  Reviewed, and family history includes Alcoholism in her father, maternal grandfather, maternal uncle, paternal aunt, paternal grandfather, and paternal uncle; Arthritis in her maternal grandfather; Breast Cancer (age of onset: 60.00) in her maternal grandmother; Depression in her brother, maternal grandmother, maternal uncle, and mother; Diabetes Type 1  in her paternal uncle; Hearing Loss in her maternal grandfather; Hypertension in her paternal grandfather; Vision Loss in her paternal grandmother.          No Known Allergies   Medications Prior to Admission   Medication Sig Dispense Refill Last Dose     aspirin (ASA) 81 MG chewable tablet Take 81 mg by mouth           Review of Systems:  Pertinent items are noted in HPI.   Physical Exam:  Temp:  [97.9  F (36.6  C)] 97.9  F (36.6  C)  Pulse:  [80] 80  Resp:  [16] 16  BP: (110-121)/(70-78) 121/78    /78   Temp 97.9  F (36.6  C) (Oral)   Resp 16   LMP 11/30/2020     General appearance:  laboring  Psych: AAO x3  Skin: Pink, warm & dry  HEENT: unremarkable  Cardiovascular:  RRR, S1, S2, no extra sounds or murmurs  Respiratory:  breath sounds CTA bilaterally, anteriorly and posteriorly  Abdomen: soft, NT, gravid  Leopolds: ROT         EFW:<4500 grams (AGA)     FHR:Baseline: 130 bpm, Variability: Moderate (6 - 25 bpm), Accelerations: present and Decelerations: Absent    Uterine contractions: Frequency: Every 3-5 minutes, Duration: 60-80 seconds and Intensity: moderate    SVE: 4 cm/  80 % effaced/ -1 station/ mid position/ medium consistency.  BOW intact.                Notable AP/IP factors to date:  1.) GBS negative  2.) Blood type A positive  3.) Hx of preeclampsia and gestational hypertension - normal BP upon admission  4.) Last hgb 11.5  5.) Echogenic focus in fetal heart and bowel, NIPT normal, declined MFM referral  6.) Hx of precipitous deliveries    Impression:  Cayla Lerner is a 31 year old year old at 40w3d weeks  Category 1 FHTs  Early Active labor  Hx precipitous deliveries     Plan:  - [facility]  - Admission labs obtained  - Encourage position changes and rest as desired  - Labor support PRN, patient to call out if she feels things are progressing or she needs support.   - Intermittent fetal monitoring  - Anticipate Spontaneous vaginal birth    Total time spent on the date  of this encounter doing: chart review, review of test results, patient visit, performing the physical exam, education, counseling, developing this plan of care, and documenting = 30 minutes.    NELDA Vega, SHAUNA   9/9/2021 2:09 AM

## 2021-09-09 NOTE — PROGRESS NOTES
Uma update on patient feeling pressure. Uma did a SVE 6/80/0. I asked patient if she would like me to be in the room with her, she said she'd prefer to just have her  in the room with her. Will Doptone every 30 minutes.

## 2021-09-09 NOTE — PROGRESS NOTES
Labor Progress Note:    Patient Name:  Cayla Lerner  :      1990  MRN:      [id number]    Assessment:    31 year old  at 40w3d  Active labor  GBS negative  Hx of precip deliveries  No cervical change noted for 4 hous  Plan:   -AROM at 0713, moderate amt of clear fluid  -Routine CNM support & management. Encourage position changes, ambulation, rest as desired.  -Anticipate progress and NSVB.   -Reevaluate progress in 2-3 hours or sooner with a change in status.    Subjective:  Cayla Lerner is coping well with contractions. Good PO fluid intake.   Contraction strength increased after AROM pt C/O feeling pressure with contractions.  Voiding without issue.     Objective:  /68   Temp 98  F (36.7  C) (Oral)   Resp 16   LMP 2020   FHR: category I tracing  Uterine contractions: Q2-3 min  SVE: 8cm at 0750      TT with patient 30 mn >50% time spent in counseling    Provider:  Larissa Guerrero DNP,APRN,CNM    Date:  2021  Time:  7:51 AM

## 2021-09-09 NOTE — PROGRESS NOTES
Patient requested SVE. SVE of 6/80/0 @ 7958. Suggested patient walks around room, and utilize birthing ball. Patient has been up and moving around room. Will continue to monitor.

## 2021-09-09 NOTE — PROGRESS NOTES
CNM Labor Progress Note    LATE NOTE:    Patient Name:  Cayla Lerner  :      1990  MRN:      [id number]    Assessment:    31 year old year-old,  at 40w3d.  FHT's cat 1  Early/active labor  GBS negative   Hx of preeclampsia and gestational HTN  Echogenic focus in fetal heart and bowel, NIPT normal, declined MFM referral  Hx of precipitous deliveries    Plan:     - Encouraged position changes, ambulation, use of birth ball, hydrotherapy and rest as desired.   - Continue routine CNM management and support PRN.  - Reevaluate in 2-3 hours or PRN.  - Intermittent fetal monitoring per protocol.   - Anticipate labor progress and NSVB.  - Report to NELDA Nuno, SHAUNA assuming care at 06:00    Subjective:  Cayla coping well with regular contractions, now breathing through them, reports that they are getting stronger and she feels the urge to push at the peak of the contractions, desired cervical exam. Davon at bedside and supportive. No questions at this time, would like to continue laboring on her own for now and will call out with any changes.     Patient called out again at 5:45 and reports stronger urge to push now, wanting to start pushing with next contraction, this writer in another room, RN proceeded with exam and encouraged position changes and being more upright to encourage labor progress.       Objective:    /68   Temp 98  F (36.7  C) (Oral)   Resp 16   LMP 2020     FHR: Baseline: 135 via intermittent auscultation    Uterine contractions: Q2-4, Moderate to palpation    SVE: 6/80/-1/posterior/medium at 4:20am  Repeat Exam by RN at 5:55, unchanged    TT with patient 10 minutes, >50% CCC    Provider: NELDA Mauricio, SHAUNA  [facility]

## 2021-09-10 VITALS
HEART RATE: 70 BPM | SYSTOLIC BLOOD PRESSURE: 103 MMHG | OXYGEN SATURATION: 97 % | DIASTOLIC BLOOD PRESSURE: 53 MMHG | RESPIRATION RATE: 12 BRPM | TEMPERATURE: 98 F

## 2021-09-10 PROCEDURE — 99207 PR SUBSEQUENT HOSPITAL CARE FOR MOTHER, 15 MINUTES: CPT | Performed by: ADVANCED PRACTICE MIDWIFE

## 2021-09-10 PROCEDURE — 250N000013 HC RX MED GY IP 250 OP 250 PS 637: Performed by: MIDWIFE

## 2021-09-10 RX ORDER — MODIFIED LANOLIN
OINTMENT (GRAM) TOPICAL
COMMUNITY
Start: 2021-09-10 | End: 2021-10-25

## 2021-09-10 RX ORDER — IBUPROFEN 800 MG/1
800 TABLET, FILM COATED ORAL EVERY 8 HOURS PRN
COMMUNITY
Start: 2021-09-10 | End: 2021-09-26

## 2021-09-10 RX ORDER — DOCUSATE SODIUM 100 MG/1
100 CAPSULE, LIQUID FILLED ORAL 2 TIMES DAILY PRN
COMMUNITY
Start: 2021-09-10 | End: 2021-09-26

## 2021-09-10 RX ORDER — ACETAMINOPHEN 325 MG/1
325-650 TABLET ORAL EVERY 4 HOURS PRN
COMMUNITY
Start: 2021-09-10 | End: 2021-09-26

## 2021-09-10 RX ADMIN — DOCUSATE SODIUM 100 MG: 100 CAPSULE, LIQUID FILLED ORAL at 08:46

## 2021-09-10 RX ADMIN — ACETAMINOPHEN 650 MG: 325 TABLET ORAL at 06:20

## 2021-09-10 RX ADMIN — IBUPROFEN 800 MG: 800 TABLET, FILM COATED ORAL at 08:46

## 2021-09-10 NOTE — PLAN OF CARE
Problem: Adjustment to Role Transition (Postpartum Vaginal Delivery)  Goal: Successful Maternal Role Transition  Outcome: Improving   Bonding well with infant.   very supportive.

## 2021-09-10 NOTE — DISCHARGE SUMMARY
OB Discharge Summary      Date:  9/10/2021    Name:  Cayla Lerner  :  1990  MRN:  [id number]      Admission Date:  2021  Delivery Date:  2021  Gestational Age at Delivery:  40w3d  Discharge Date:  9/10/2021    Principal Diagnosis:    Problem List Items Addressed This Visit        Other    * (Principal)  (normal spontaneous vaginal delivery) - Primary    Relevant Medications    acetaminophen (TYLENOL) 325 MG tablet    docusate sodium (COLACE) 100 MG capsule    ibuprofen (ADVIL/MOTRIN) 800 MG tablet    lanolin ointment    benzocaine-menthol (DERMOPLAST) 20-0.5 % AERO    Other Relevant Orders    Lactation Referral    Home Care Nursing Referral      Other Visit Diagnoses     Postpartum care and examination of lactating mother        Relevant Medications    acetaminophen (TYLENOL) 325 MG tablet    docusate sodium (COLACE) 100 MG capsule    ibuprofen (ADVIL/MOTRIN) 800 MG tablet    lanolin ointment    benzocaine-menthol (DERMOPLAST) 20-0.5 % AERO    Other Relevant Orders    Breast pump - Manual/Electric    Lactation Referral    Home Care Nursing Referral        Other Diagnosis:      Conditions complicating Pregnancy: none    Procedure(s) Performed:     Indication for Induction:  n/a     Condition at Discharge:  stable    Discharge Medications:   Current Facility-Administered Medications   Medication     acetaminophen (TYLENOL) tablet 650 mg     benzocaine-menthol (DERMOPLAST) topical spray     bisacodyl (DULCOLAX) Suppository 10 mg     docusate sodium (COLACE) capsule 100 mg     ibuprofen (ADVIL/MOTRIN) tablet 800 mg     Subjective:  Cayla Lerner feels ready for discharge. The patient is voiding and ambulating without difficulty. Tolerating normal diet.  Bleeding is light without clots. No BM yet. Passing flatus. Pain is well controlled with current medications.  Attentive to .  Baby is breastfeeding on cue. Breastfeeding with the assistance of the nursing staff. Postpartum  contraception plans include: condoms until decision on family planning made; couple unsure if they want one more child. May also consider Mirena IUD which she has used in the past with success. Support at home identified spouse with 10 weed off and family nearby. Patient will have 16 weeks off from work. Patient denies hx of mood disorder.    Objective:  BP 99/62 (BP Location: Right arm)   Pulse 65   Temp 98.4  F (36.9  C) (Oral)   Resp 16   LMP 2020   Breastfeeding Unknown   Patient Vitals for the past 24 hrs:   BP Temp Temp src Pulse Resp   09/10/21 0621 99/62 -- -- 65 16   09/10/21 0305 105/72 98.4  F (36.9  C) Oral 69 16   21 2210 117/58 98.7  F (37.1  C) Oral 75 16   21 1845 109/66 98.4  F (36.9  C) Oral 76 16   21 1445 108/58 -- -- 90 18   21 1040 109/64 -- -- -- --   21 1030 104/63 -- -- -- --   21 1010 114/69 -- -- -- --   21 1000 108/60 -- -- -- --   21 0940 118/69 -- -- -- --   21 0930 122/72 -- -- -- --   21 0910 120/69 -- -- -- --   21 0900 128/70 -- -- -- --       General Appearance:   Alert, NAD   Breast Exam:  Breasts symmetric, nipples red, intact. No cracks or bleeding.   Abdomen:  Soft, non-tender, fundus firm @ U/-1, midline.   Perineum:  Tissues intact, no edema. Lochia scant, rubra,          non-malodorous, without clots.    Legs:  no edema, no calf tenderness, no varicosities.     Recent labs:  Hemoglobin 11.9 on 21.    Immunization History   Administered Date(s) Administered     COVID-19,WALTER,Emily (18+ YRS) 03/10/2021     Influenza Vaccine IM > 6 months Valent IIV4 (Alfuria,Fluzone) 10/30/2017, 2018, 10/31/2019     Tdap (Adacel,Boostrix) 2017, 2018, 2021       Assessment:   Day 1 postpartum  S/p   Breastfeeding established  Stable postpartum course      Plan:    -Reviewed postpartum teaching. Discussed khris care, breast care, pain management, bowel changes, return of fertility,  postpartum mood changes and adjustments, postpartum blues vs. postpartum depression, sleep changes, required support.   -Discharge instructions reviewed. Encouraged to call on-call CNM with any warning signs: pelvic pain, excessive perineal pain, heavy bleeding, large clots, fever, chills, abdominal tenderness, breast pain or redness, or signs/symptoms of postpartum depression.   -Return of fertility reviewed.    -Pt to obtain Medela breast pump prior to discharge.  -Discharge to home today. Follow-up at 2 and 6 weeks postpartum in clinic or sooner as needed. Patient instructed to call the CN scheduling number for appointment at [phone].    Total time spent with patient: 20 minutes, >50% time spent counseling and coordination of care.    Provider:  Patsy Avendano CNM    Date:  9/10/2021  Time:  8:25 AM

## 2021-09-21 ENCOUNTER — IMMUNIZATION (OUTPATIENT)
Dept: PEDIATRICS | Facility: CLINIC | Age: 31
End: 2021-09-21
Payer: COMMERCIAL

## 2021-09-21 PROCEDURE — 90686 IIV4 VACC NO PRSV 0.5 ML IM: CPT

## 2021-09-21 PROCEDURE — 90471 IMMUNIZATION ADMIN: CPT

## 2021-09-23 ENCOUNTER — OFFICE VISIT (OUTPATIENT)
Dept: MIDWIFE SERVICES | Facility: CLINIC | Age: 31
End: 2021-09-23
Payer: COMMERCIAL

## 2021-09-23 VITALS
SYSTOLIC BLOOD PRESSURE: 100 MMHG | HEART RATE: 68 BPM | WEIGHT: 140 LBS | DIASTOLIC BLOOD PRESSURE: 56 MMHG | HEIGHT: 66 IN | BODY MASS INDEX: 22.5 KG/M2

## 2021-09-23 DIAGNOSIS — Z30.8 ENCOUNTER FOR OTHER CONTRACEPTIVE MANAGEMENT: ICD-10-CM

## 2021-09-23 PROBLEM — O35.DXX0 ECHOGENIC FOCUS OF BOWEL, FETAL, AFFECTING CARE OF MOTHER, ANTEPARTUM: Status: RESOLVED | Noted: 2021-04-15 | Resolved: 2021-09-23

## 2021-09-23 PROBLEM — Z30.9 CONTRACEPTIVE MANAGEMENT: Status: ACTIVE | Noted: 2021-09-23

## 2021-09-23 PROCEDURE — 99024 POSTOP FOLLOW-UP VISIT: CPT | Performed by: ADVANCED PRACTICE MIDWIFE

## 2021-09-23 ASSESSMENT — MIFFLIN-ST. JEOR: SCORE: 1366.79

## 2021-09-23 NOTE — PROGRESS NOTES
"Assessment:   1.  Postpartum 2 weeks, status post normal spontaneous vaginal birth  2.  Intact perineum  3.  Lactating mother  4.  Contraceptive management    Plan:     -Routine postpartum care  -Outpatient lactation resources reviewed including [facility], [employer], community groups. Lactation referral provided as needed.  -Reviewed PP depression, anxiety, and psychosis s/sx, self care measures to reduce risk, safety plan and crisis numbers, and when to call for further resources. Written information provided for community resources and instructed to call on-call CNM with concerns or schedule clinic visit as needed.  -Return to clinic in 4 weeks for a routine exam following birth      Subjective:      Cayla Lerner is a 31 year old female who presents for a postpartum follow up visit. She is 2 weeks postpartum following a  with perineum intact The delivery was at 40+3 gestational weeks. I have fully reviewed the prenatal and intrapartum course. The amount and color of the lochia is appropriate for the duration of recovery. Cayla feels well and postpartum course has been stable. Baby Ahmet's  course has been stable. The baby, Ahmet, is being fed by breast. She denies breast discomfort, pain with feedings, concerns about baby's weight gain and concerns about milk supply. Voiding without difficulty, lochia normal, tolerating normal diet, and passing flatus and normal bowel movements.  Pain is well controlled with current medications. Cayla  offers no emotional concerns.  Postpartum depression screening score: 30, negative #10.  WESLEY-7: 0 \"not difficult at all.\"  Plans FUNEZ for birth control.   The following portions of the patient's history were reviewed and updated as appropriate: allergies, current medications, problem list, past family history, past medical history, past social history, past surgical history and problem list.    Plans return to work 2021.    Review of " Systems  General:  Denies problem  Eyes: Denies problem  Ears/Nose/Throat: Denies problem  Cardiovascular: Denies problem  Respiratory:  Denies problem  Gastrointestinal:  Denies problem, Genitourinary: Denies problem  Musculoskeletal:  Denies problem  Skin: Denies problem  Neurologic: Denies problem  Psychiatric: Denies problem  Endocrine: Denies problem  Heme/Lymphatic: Denies problem   Allergic/Immunologic: Denies problem       Objective:        Physical Exam:  General Appearance: Alert, cooperative, no distress, appears stated age  Skin: Skin color, texture, turgor normal, no rashes or lesions.  MAEVE: grossly normal; otoscopic and opthalmic exam not performed.   Neck: Symmetrical, trachea midline  Respiratory: Normal respiratory effort  Cardiovascular: No peripheral edema.  Musculoskeletal: No digital cyanosis. Normal gait and station. Moves all limbs freely.  Extremities: Extremities normal, atraumatic, no cyanosis or edema  Neuro: Cranial nerves II-XII grossly intact.  Psych: Intact judgment and insight. OX3 and conversational.        Total time with patient 40 minutes with >50% on education, counseling and coordination of care.

## 2021-09-30 ENCOUNTER — MYC MEDICAL ADVICE (OUTPATIENT)
Dept: MIDWIFE SERVICES | Facility: CLINIC | Age: 31
End: 2021-09-30

## 2021-09-30 ASSESSMENT — ANXIETY QUESTIONNAIRES
5. BEING SO RESTLESS THAT IT IS HARD TO SIT STILL: NOT AT ALL
IF YOU CHECKED OFF ANY PROBLEMS ON THIS QUESTIONNAIRE, HOW DIFFICULT HAVE THESE PROBLEMS MADE IT FOR YOU TO DO YOUR WORK, TAKE CARE OF THINGS AT HOME, OR GET ALONG WITH OTHER PEOPLE: NOT DIFFICULT AT ALL
2. NOT BEING ABLE TO STOP OR CONTROL WORRYING: NOT AT ALL
1. FEELING NERVOUS, ANXIOUS, OR ON EDGE: NOT AT ALL
6. BECOMING EASILY ANNOYED OR IRRITABLE: NOT AT ALL
GAD7 TOTAL SCORE: 0
3. WORRYING TOO MUCH ABOUT DIFFERENT THINGS: NOT AT ALL
7. FEELING AFRAID AS IF SOMETHING AWFUL MIGHT HAPPEN: NOT AT ALL

## 2021-09-30 ASSESSMENT — PATIENT HEALTH QUESTIONNAIRE - PHQ9: 5. POOR APPETITE OR OVEREATING: NOT AT ALL

## 2021-10-01 ASSESSMENT — ANXIETY QUESTIONNAIRES: GAD7 TOTAL SCORE: 0

## 2021-10-25 ENCOUNTER — OFFICE VISIT (OUTPATIENT)
Dept: FAMILY MEDICINE | Facility: CLINIC | Age: 31
End: 2021-10-25
Payer: COMMERCIAL

## 2021-10-25 VITALS
BODY MASS INDEX: 22.66 KG/M2 | DIASTOLIC BLOOD PRESSURE: 64 MMHG | TEMPERATURE: 98 F | OXYGEN SATURATION: 97 % | SYSTOLIC BLOOD PRESSURE: 110 MMHG | HEIGHT: 66 IN | WEIGHT: 141 LBS | RESPIRATION RATE: 16 BRPM | HEART RATE: 73 BPM

## 2021-10-25 DIAGNOSIS — Z76.89 ENCOUNTER TO ESTABLISH CARE: Primary | ICD-10-CM

## 2021-10-25 PROCEDURE — 99212 OFFICE O/P EST SF 10 MIN: CPT | Performed by: NURSE PRACTITIONER

## 2021-10-25 ASSESSMENT — MIFFLIN-ST. JEOR: SCORE: 1371.32

## 2021-10-25 ASSESSMENT — PAIN SCALES - GENERAL: PAINLEVEL: NO PAIN (0)

## 2021-10-25 NOTE — PROGRESS NOTES
"  Assessment & Plan     Encounter to establish care  No concerns today                 Return in about 1 year (around 10/25/2022) for Physical Exam.    Shayy Noriega NP  [facility]    Osmani SWEENEY is a 31 year old who presents for the following health issues     HPI     Establish care with new provider.     3 children- 4 year old, 2 year old, and 6 week old  She is currently on maternity leave for 12 weeks.  Her  also just started returning to leave today.  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  She is in school at [employer] for masters in nursing transcultural.    She has no current concerns.  She is on no prescription medications.  She is breast-feeding.    Review of Systems   Constitutional, HEENT, cardiovascular, pulmonary, gi and gu systems are negative, except as otherwise noted.      Objective    /64 (BP Location: Right arm)   Pulse 73   Temp 98  F (36.7  C) (Oral)   Resp 16   Ht 1.676 m (5' 6\")   Wt 64 kg (141 lb)   LMP 11/30/2020   SpO2 97%   BMI 22.76 kg/m    Body mass index is 22.76 kg/m .  Physical Exam   GENERAL: healthy, alert and no distress  RESP: lungs clear to auscultation - no rales, rhonchi or wheezes  CV: regular rates and rhythm, normal S1 S2, no S3 or S4 and no murmur, click or rub  PSYCH: mentation appears normal, affect normal/bright              "

## 2021-10-28 ENCOUNTER — PRENATAL OFFICE VISIT (OUTPATIENT)
Dept: MIDWIFE SERVICES | Facility: CLINIC | Age: 31
End: 2021-10-28
Payer: COMMERCIAL

## 2021-10-28 VITALS
BODY MASS INDEX: 22.76 KG/M2 | SYSTOLIC BLOOD PRESSURE: 98 MMHG | HEART RATE: 70 BPM | OXYGEN SATURATION: 99 % | WEIGHT: 141 LBS | DIASTOLIC BLOOD PRESSURE: 70 MMHG

## 2021-10-28 DIAGNOSIS — Z30.8 ENCOUNTER FOR OTHER CONTRACEPTIVE MANAGEMENT: ICD-10-CM

## 2021-10-28 PROCEDURE — 99207 PR POST PARTUM EXAM: CPT | Performed by: ADVANCED PRACTICE MIDWIFE

## 2021-10-29 NOTE — PROGRESS NOTES
6-week Postpartum Visit:     Assessment:   1.  Postpartum 6 weeks, status post normal spontaneous vaginal birth  2.  Intact perineum  3.  Breast-feeding  4.  Contraceptive management    Plan:   - Reviewed warning signs of postpartum depression. [place]  Mental Health Resource List given for future reference prn.   -PP exercises reviewed and encouraged modified abdominal crunches and Kegels daily. Encouraged integrating formal exercise, such as walking 20-30mns daily.   -Warning signs of breast infections of mastitis and thrush reviewed. Breastfeeding support resource list and contact info given, including [facility] and [facility].   -Encouraged to continue with PNV, Vitamin D and DHA supplements.   - Return of fertility discussed. Plans nothing for contraception.   -Reviewed warning signs of pelvic pain, excessive bleeding or abdominal pain, fever/chills, or signs of breast infection.   -Encouraged a prenatal vitamin while breast-feeding.  -Labs today: None.   -RTC for routine health maintenance or sooner as needed.     TT with patient 40 mns, >50% time spent in counseling or coordination of care.     Subjective:   Cayla is a 32yo, here for her 6 week postpartum exam. She is integrating birth experience/care and transition well. Bleeding and uterine cramping have ceased, although lasted 5 weeks, longer than previous to postpartum periods. Denies pain. Reports normal bowel and bladder functioning. EPDS score 0/30, 0 to #10. Reports good family/friend support.  Breastfeeding well-established. Feeding q 2-3 hours during the day, baby awakens once in the night.   Denies pain or concerns. Plans to use nothing for contraception.   States that she experienced a subjective fever 4 weeks postpartum but there was a viral upper respiratory infection circulating in the household.  She denied experiencing breast erythema/increased warmth/pain, dysuria/foul-smelling urine, flank  pain, foul-smelling vaginal discharge/irritation/pruritus or overall body aches/malaise.  She believes she had a viral illness which has RESOLVED.    Review of Systems  Pertinent items are noted in HPI.      Objective:     Physical Exam:  General: Pleasant, articulate, well-groomed, well-nourished female.  Not in any apparent distress.  Neck: Supple.  Thyroid: Small, symmetrical, no nodules noted.  Lymphadenopathy: Negative.  Lungs: Clear to auscultation bilaterally, and a nonlabored breathing pattern.  Cardio: Regular rate and rhythm, negative for murmur.   Pelvic exam: Patient declines

## 2021-12-01 ENCOUNTER — MEDICAL CORRESPONDENCE (OUTPATIENT)
Dept: HEALTH INFORMATION MANAGEMENT | Facility: CLINIC | Age: 31
End: 2021-12-01
Payer: COMMERCIAL

## 2022-01-28 ENCOUNTER — MEDICAL CORRESPONDENCE (OUTPATIENT)
Dept: HEALTH INFORMATION MANAGEMENT | Facility: CLINIC | Age: 32
End: 2022-01-28
Payer: COMMERCIAL

## 2022-03-25 ENCOUNTER — MEDICAL CORRESPONDENCE (OUTPATIENT)
Dept: HEALTH INFORMATION MANAGEMENT | Facility: CLINIC | Age: 32
End: 2022-03-25
Payer: COMMERCIAL

## 2022-04-07 ENCOUNTER — OFFICE VISIT (OUTPATIENT)
Dept: MIDWIFE SERVICES | Facility: CLINIC | Age: 32
End: 2022-04-07
Payer: COMMERCIAL

## 2022-04-07 VITALS
BODY MASS INDEX: 20.73 KG/M2 | HEART RATE: 72 BPM | SYSTOLIC BLOOD PRESSURE: 98 MMHG | HEIGHT: 66 IN | WEIGHT: 129 LBS | DIASTOLIC BLOOD PRESSURE: 56 MMHG

## 2022-04-07 DIAGNOSIS — Z00.00 ANNUAL PHYSICAL EXAM: Primary | ICD-10-CM

## 2022-04-07 DIAGNOSIS — N61.0 BREAST INFECTION: ICD-10-CM

## 2022-04-07 DIAGNOSIS — Z12.4 SCREENING FOR MALIGNANT NEOPLASM OF CERVIX: ICD-10-CM

## 2022-04-07 PROCEDURE — 87624 HPV HI-RISK TYP POOLED RSLT: CPT | Performed by: ADVANCED PRACTICE MIDWIFE

## 2022-04-07 PROCEDURE — 99213 OFFICE O/P EST LOW 20 MIN: CPT | Mod: 25 | Performed by: ADVANCED PRACTICE MIDWIFE

## 2022-04-07 PROCEDURE — G0123 SCREEN CERV/VAG THIN LAYER: HCPCS | Performed by: ADVANCED PRACTICE MIDWIFE

## 2022-04-07 PROCEDURE — 99395 PREV VISIT EST AGE 18-39: CPT | Performed by: ADVANCED PRACTICE MIDWIFE

## 2022-04-07 RX ORDER — DICLOXACILLIN SODIUM 500 MG
500 CAPSULE ORAL 4 TIMES DAILY
Qty: 40 CAPSULE | Refills: 0 | Status: SHIPPED | OUTPATIENT
Start: 2022-04-07 | End: 2022-04-17

## 2022-04-07 NOTE — PROGRESS NOTES
"SUBJECTIVE:  Cayla Lerner is a 31 year old female who presents for annual exam. Well known to this CNM group. She is felling well overall. Continues to breast feed her 7 month old baby boy. She has noticed a sore on her right breast that has been present for about 1 week. Thinks it is a pimple. Has not done anything in particular for pain as it isn't to painful (only if she applies pressure) and she can still breast feed. Has had mastitis in the past and this feels different. No fever or flu-like symptoms.    Chief Complaint   Patient presents with     Physical     Wears seat belt: always  Exercise: 1-2 times per week, walking, yoga  Dental exams: regularly  Smoking status: non-smoker  Feels safe in relationships.   Currently sexually active with 1 male partner.  Contraception: none. Patient is satisfied with this method as she is open to another pregnancy at this time. Reports not getting a period for about 10 months after her first baby was born.  Regular menses? No, breast feeding    Active diagnoses this visit:  Screening for malignant neoplasm of cervix  Breast infection  Annual physical exam    Review Of Systems: negative except that which is obtained in the HPI    Medical hx, surgical hx, social hx, family hx, medications, and allergies reviewed and updated.    OBJECTIVE:    BP 98/56 (BP Location: Right arm, Patient Position: Sitting, Cuff Size: Adult Regular)   Pulse 72   Ht 1.676 m (5' 6\")   Wt 58.5 kg (129 lb)   Breastfeeding Yes   BMI 20.82 kg/m      Exam:  Constitutional: healthy, alert and no distress  Head: Normocephalic. No masses, lesions, tenderness or abnormalities  Cardiovascular: PMI normal. No lifts, heaves, or thrills. RRR. No murmurs, clicks gallops or rub  Respiratory: Good diaphragmatic excursion, CTAB, breathing unlabored.  Breasts: Symmetrical, a 1.5 x 1.5 cm red lump observed on the inner upper aspect of right breast, just above areola. palpable mass: smooth, round, tender and " "well delineated from surrounding tissue. No \"head\" on lump like you would observe with a pimple, no nipple discharge, no axillary adenopathy.  One mole present above left breast, pt considering removal.     Gastrointestinal: Abdomen soft, non-tender. BS normal. No masses, organomegaly  : Normal external genitalia without lesions  Pelvic exam: normal vagina and vulva, normal parous and closed cervix without lesions or tenderness, uterus normal size, adenxa normal in size without tenderness, pap smear done. Negative CMT.  Musculoskeletal: extremities normal- no gross deformities noted, gait normal and normal muscle tone  Neurologic: Gait normal. Reflexes normal and symmetric. Sensation grossly WNL.  Psychiatric: mentation appears normal and affect normal/bright    ASSESSMENT / PLAN:  (Z00.00) Annual physical exam  (primary encounter diagnosis)  Comment: Encouraged dermatology visit for routine skin check and to observe moles.  Plan: RTC 1 year for annual exam or PRN.    (Z12.4) Screening for malignant neoplasm of cervix  Plan: PAP imaged thin layer screen - co test discussed and ordered.    (N61.0) Breast infection  Comment: discussed infection of skin on breast and common culprit (staph aureus) and option to wait and observe over 12-24 hours for improvement vs beginning oral ABX now.   Plan: Will send RX to patient's pharmacy on file. Discussed how to take. If infection does not improve within 24-48 hours of antibiotic therapy, patient will alert us and plan to refer to breast center.  Dicloxacillin (DYNAPEN) 500 MG capsule, take four times daily by mouth for 10 days. Reviewed this is compatible with breast feeding.      47 minutes on the date of the encounter doing chart review, patient visit and documentation    NELDA Bonilla, CNCAROL                  "

## 2022-04-13 LAB
HUMAN PAPILLOMA VIRUS 16 DNA: NEGATIVE
HUMAN PAPILLOMA VIRUS 18 DNA: NEGATIVE
HUMAN PAPILLOMA VIRUS FINAL DIAGNOSIS: NORMAL
HUMAN PAPILLOMA VIRUS OTHER HR: NEGATIVE

## 2022-04-15 LAB
BKR LAB AP GYN ADEQUACY: NORMAL
BKR LAB AP GYN INTERPRETATION: NORMAL
BKR LAB AP HPV REFLEX: NORMAL
BKR LAB AP LMP: NORMAL
BKR LAB AP PREVIOUS ABNORMAL: NORMAL
PATH REPORT.COMMENTS IMP SPEC: NORMAL
PATH REPORT.COMMENTS IMP SPEC: NORMAL
PATH REPORT.RELEVANT HX SPEC: NORMAL

## 2022-05-02 ENCOUNTER — LAB REQUISITION (OUTPATIENT)
Dept: LAB | Facility: HOSPITAL | Age: 32
End: 2022-05-02

## 2022-05-02 LAB — SARS-COV-2 RNA RESP QL NAA+PROBE: NEGATIVE

## 2022-05-02 PROCEDURE — U0003 INFECTIOUS AGENT DETECTION BY NUCLEIC ACID (DNA OR RNA); SEVERE ACUTE RESPIRATORY SYNDROME CORONAVIRUS 2 (SARS-COV-2) (CORONAVIRUS DISEASE [COVID-19]), AMPLIFIED PROBE TECHNIQUE, MAKING USE OF HIGH THROUGHPUT TECHNOLOGIES AS DESCRIBED BY CMS-2020-01-R: HCPCS | Performed by: INTERNAL MEDICINE

## 2022-08-15 ENCOUNTER — LAB REQUISITION (OUTPATIENT)
Dept: LAB | Facility: HOSPITAL | Age: 32
End: 2022-08-15

## 2022-08-15 LAB — SARS-COV-2 RNA RESP QL NAA+PROBE: NEGATIVE

## 2022-08-15 PROCEDURE — U0005 INFEC AGEN DETEC AMPLI PROBE: HCPCS | Performed by: INTERNAL MEDICINE

## 2022-09-17 ENCOUNTER — HEALTH MAINTENANCE LETTER (OUTPATIENT)
Age: 32
End: 2022-09-17

## 2022-10-04 PROBLEM — O35.8XX0 MATERNAL CARE FOR OTHER (SUSPECTED) FETAL ABNORMALITY AND DAMAGE, NOT APPLICABLE OR UNSPECIFIED: Status: RESOLVED | Noted: 2021-04-15 | Resolved: 2021-09-23

## 2023-01-01 ENCOUNTER — MYC MEDICAL ADVICE (OUTPATIENT)
Dept: MIDWIFE SERVICES | Facility: CLINIC | Age: 33
End: 2023-01-01

## 2023-01-02 DIAGNOSIS — O21.9 NAUSEA AND VOMITING DURING PREGNANCY: Primary | ICD-10-CM

## 2023-01-02 RX ORDER — ONDANSETRON 8 MG/1
8 TABLET, ORALLY DISINTEGRATING ORAL EVERY 8 HOURS PRN
Qty: 30 TABLET | Refills: 1 | Status: SHIPPED | OUTPATIENT
Start: 2023-01-02 | End: 2023-05-08

## 2023-01-02 NOTE — TELEPHONE ENCOUNTER
"PHONE NOTE: Called Cayla to discuss her nausea and vomiting in early pregnancy.  Patient states that she thinks she is about 6 weeks pregnant.  She has used Zofran in the past and is aware of the risks of taking in early pregnancy including heart defects and other issues.  Patient states that she is \"miserable\" and has continuous nausea and vomiting whenever she eats or drinks.  She cannot keep anything down.  Patient does not desire IV hydration at this time but knows the options.  Reviewed other options for medications.  Patient not interested at this time in Operax or Smart GPS Backpack as she has tried before in previous pregnancies and they did not work very well for her.  She may consider these in the future.  Reviewed other non medication options including classic Coke with caffeine, smoothie with peach/lime/fresh carlos, bland foods such as dry cereal or saltine crackers, also recommended always having something in her stomach and never an empty stomach.  Also recommended Unisom with vitamin B6 in combination.  Patient states that she has tried everything except for classic Coke.  She will try this but would also like a prescription for Zofran, knowing the risks.  Patient sounds tired and is yawning and groggy on the phone.  She states she has 3 young children at home and is very tired.  Rx for Zofran 8 mg, 30 tabs with 1 refill called into her pharmacy.  Per patient request (she said that in the past she had issues with her insurance so she would like the 8 mg tablet that she can cut in half); patient knows to call if she has continued issues with her insurance and we could give her an alternative prescription for fewer tablets or call her insurance company for her.  Patient has a supportive  and family living with her.  Patient will call and make an appointment for her IOB for about 10 to 12 weeks gestation and has phone numbers.  Patient knows to call back and if she is unable to keep anything down for 24 " hours or is becoming dehydrated, or losing weight.  Patient has phone numbers and has followed with the midwives for a long time and is familiar with our practice.  Reviewed phone numbers

## 2023-01-23 ENCOUNTER — HEALTH MAINTENANCE LETTER (OUTPATIENT)
Age: 33
End: 2023-01-23

## 2023-02-09 ENCOUNTER — PRENATAL OFFICE VISIT (OUTPATIENT)
Dept: MIDWIFE SERVICES | Facility: CLINIC | Age: 33
End: 2023-02-09
Payer: COMMERCIAL

## 2023-02-09 VITALS
SYSTOLIC BLOOD PRESSURE: 112 MMHG | DIASTOLIC BLOOD PRESSURE: 58 MMHG | WEIGHT: 122 LBS | BODY MASS INDEX: 19.69 KG/M2 | HEART RATE: 68 BPM

## 2023-02-09 DIAGNOSIS — Z34.80 SUPERVISION OF OTHER NORMAL PREGNANCY, ANTEPARTUM: ICD-10-CM

## 2023-02-09 DIAGNOSIS — Z87.59 HISTORY OF PRE-ECLAMPSIA: ICD-10-CM

## 2023-02-09 DIAGNOSIS — Z91.89 AT RISK FOR COMPLICATION OF PREGNANCY: ICD-10-CM

## 2023-02-09 DIAGNOSIS — Z13.79 GENETIC SCREENING: Primary | ICD-10-CM

## 2023-02-09 PROBLEM — Z30.9 CONTRACEPTIVE MANAGEMENT: Status: RESOLVED | Noted: 2021-09-23 | Resolved: 2023-02-09

## 2023-02-09 LAB
ABO/RH(D): NORMAL
ALBUMIN SERPL BCG-MCNC: 4 G/DL (ref 3.5–5.2)
ALP SERPL-CCNC: 28 U/L (ref 35–104)
ALT SERPL W P-5'-P-CCNC: 13 U/L (ref 10–35)
ANION GAP SERPL CALCULATED.3IONS-SCNC: 11 MMOL/L (ref 7–15)
ANTIBODY SCREEN: NEGATIVE
AST SERPL W P-5'-P-CCNC: 18 U/L (ref 10–35)
BILIRUB SERPL-MCNC: 0.2 MG/DL
BUN SERPL-MCNC: 9.8 MG/DL (ref 6–20)
CALCIUM SERPL-MCNC: 9.2 MG/DL (ref 8.6–10)
CHLORIDE SERPL-SCNC: 102 MMOL/L (ref 98–107)
CREAT SERPL-MCNC: 0.54 MG/DL (ref 0.51–0.95)
DEPRECATED HCO3 PLAS-SCNC: 23 MMOL/L (ref 22–29)
ERYTHROCYTE [DISTWIDTH] IN BLOOD BY AUTOMATED COUNT: 13.3 % (ref 10–15)
GFR SERPL CREATININE-BSD FRML MDRD: >90 ML/MIN/1.73M2
GLUCOSE SERPL-MCNC: 82 MG/DL (ref 70–99)
HCT VFR BLD AUTO: 34.9 % (ref 35–47)
HGB BLD-MCNC: 12.5 G/DL (ref 11.7–15.7)
MCH RBC QN AUTO: 30.6 PG (ref 26.5–33)
MCHC RBC AUTO-ENTMCNC: 35.8 G/DL (ref 31.5–36.5)
MCV RBC AUTO: 86 FL (ref 78–100)
PLATELET # BLD AUTO: 234 10E3/UL (ref 150–450)
POTASSIUM SERPL-SCNC: 3.8 MMOL/L (ref 3.4–5.3)
PROT SERPL-MCNC: 6.3 G/DL (ref 6.4–8.3)
RBC # BLD AUTO: 4.08 10E6/UL (ref 3.8–5.2)
SODIUM SERPL-SCNC: 136 MMOL/L (ref 136–145)
SPECIMEN EXPIRATION DATE: NORMAL
SPECIMEN EXPIRATION DATE: NORMAL
WBC # BLD AUTO: 5.9 10E3/UL (ref 4–11)

## 2023-02-09 PROCEDURE — 87086 URINE CULTURE/COLONY COUNT: CPT | Performed by: ADVANCED PRACTICE MIDWIFE

## 2023-02-09 PROCEDURE — 87389 HIV-1 AG W/HIV-1&-2 AB AG IA: CPT | Performed by: ADVANCED PRACTICE MIDWIFE

## 2023-02-09 PROCEDURE — 87340 HEPATITIS B SURFACE AG IA: CPT | Performed by: ADVANCED PRACTICE MIDWIFE

## 2023-02-09 PROCEDURE — 36415 COLL VENOUS BLD VENIPUNCTURE: CPT | Performed by: ADVANCED PRACTICE MIDWIFE

## 2023-02-09 PROCEDURE — 99207 PR FIRST OB VISIT: CPT | Performed by: ADVANCED PRACTICE MIDWIFE

## 2023-02-09 PROCEDURE — 86762 RUBELLA ANTIBODY: CPT | Performed by: ADVANCED PRACTICE MIDWIFE

## 2023-02-09 PROCEDURE — 86803 HEPATITIS C AB TEST: CPT | Performed by: ADVANCED PRACTICE MIDWIFE

## 2023-02-09 PROCEDURE — 86901 BLOOD TYPING SEROLOGIC RH(D): CPT | Performed by: ADVANCED PRACTICE MIDWIFE

## 2023-02-09 PROCEDURE — 80053 COMPREHEN METABOLIC PANEL: CPT | Performed by: ADVANCED PRACTICE MIDWIFE

## 2023-02-09 PROCEDURE — 86850 RBC ANTIBODY SCREEN: CPT | Performed by: ADVANCED PRACTICE MIDWIFE

## 2023-02-09 PROCEDURE — 86780 TREPONEMA PALLIDUM: CPT | Performed by: ADVANCED PRACTICE MIDWIFE

## 2023-02-09 PROCEDURE — 86900 BLOOD TYPING SEROLOGIC ABO: CPT | Performed by: ADVANCED PRACTICE MIDWIFE

## 2023-02-09 PROCEDURE — 85027 COMPLETE CBC AUTOMATED: CPT | Performed by: ADVANCED PRACTICE MIDWIFE

## 2023-02-09 ASSESSMENT — EDINBURGH POSTNATAL DEPRESSION SCALE (EPDS)
I HAVE BEEN ABLE TO LAUGH AND SEE THE FUNNY SIDE OF THINGS: AS MUCH AS I ALWAYS COULD
THE THOUGHT OF HARMING MYSELF HAS OCCURRED TO ME: NEVER
I HAVE BEEN SO UNHAPPY THAT I HAVE HAD DIFFICULTY SLEEPING: NOT AT ALL
I HAVE BLAMED MYSELF UNNECESSARILY WHEN THINGS WENT WRONG: NO, NEVER
TOTAL SCORE: 0
I HAVE BEEN ANXIOUS OR WORRIED FOR NO GOOD REASON: NO, NOT AT ALL
I HAVE FELT SAD OR MISERABLE: NO, NOT AT ALL
I HAVE BEEN SO UNHAPPY THAT I HAVE BEEN CRYING: NO, NEVER
I HAVE LOOKED FORWARD WITH ENJOYMENT TO THINGS: AS MUCH AS I EVER DID
THINGS HAVE BEEN GETTING ON TOP OF ME: NO, I HAVE BEEN COPING AS WELL AS EVER
I HAVE FELT SCARED OR PANICKY FOR NO GOOD REASON: NO, NOT AT ALL

## 2023-02-09 NOTE — PROGRESS NOTES
PRENATAL VISIT   FIRST OBSTETRICAL EXAM - OB     Assessment / Impression   1.  32-year-old  4 para 3-0-0-3 with IUP at 12 weeks 0 days  2.  Genetic screening  3.  Pregravid BMI 19  4.  History of preeclampsia during second labor and birth  5.  At risk for pregnancy complication    Plan:   -Early ultrasound not indicated due to certain LMP and FHTs heard today.  -IOB labs drawn including CMP.   -Pt is not a candidate for drawing lead level per [facility] screening tool.   -Reviewed prenatal care schedule.   -Optimal nutrition and weight gain discussed. Pregnancy weight gain of 25-35 lbs (BMI 18.5-24.9) encouraged.   -Anticipatory guidance for common pregnancy questions and concerns reviewed.   -Danger s/sx for this trimester reviewed with patient.   -Reviewed genetic screening options with patient, patient Noninvasive prenatal testing. The patient does not elect for quad screening.   -Reviewed carrier testing options with patient, patient does not elect for testing or referral to genetic counseling.  -IOB packet given and reviewed with patient.   -CNM services and hospital options reviewed; emergency and scheduling phone numbers given to patient.   -Because the patient does have 1 high risk factor, low-dose aspirin will be initiated at 12 weeks.   -Antepartum VTE risk factors absent.  -Pt is not at increased risk for diabetes so no further testing is indicated.  -Pt is not a candidate for an antepartum OB consult.    -Return to clinic in 4 weeks or sooner as needed.    Total time: 40 minutes spent on the date of the encounter doing chart review, review of test results, patient visit and documentation.     Subjective:   Cayla Lerner is a 32 year old G 4 P 3003 here today for her first obstetrical exam at 12 w 0 d. Here with Davon. This pregnancy is planned Attempting pregnancy for 2 months. The patient reports nausea, vomiting (2 times per day taking Zofran), fatigue and some mild breast tenderness.  Patient's last  menstrual period was 2022 (exact date)., predicting an expected date of delivery of Estimated Date of Delivery: Aug 24, 2023. Last period was normal. Her previous three cycles were normal. Her pregnancy is dated by certain LMP.     The patient states that she is in a monogamous relationship and states that she is safe. Offered GC/CT screening today and patient declines. Current symptoms also include: breast tenderness, morning sickness and nausea.     Risk factors: None. Pregnancy Risk Factors:None    The patient has the following high risk factors for preeclampsia:history of preeclampsia. The patient has the following moderate risk factors for preeclampsia:none.     The patient has the following antepartum risk factors for VTE (two or more risk factors, or 1 * risk factor, places patient at higher risk): none.   Clinical history/risk factors requiring antepartum OB consult: none.     The patient has the following risk factors for diabetes: none.    Social History:   Education level: College graduate  Occupation: Registered nurse  Partners name: Davon  ?   OB History    Para Term  AB Living   4 3 3 0 0 3   SAB IAB Ectopic Multiple Live Births   0 0 0 1 3      # Outcome Date GA Lbr Sixto/2nd Weight Sex Delivery Anes PTL Lv   4 Current            3 Term 21 40w3d 04:13 / 00:07 3.941 kg (8 lb 11 oz) M Vag-Spont None N TERRY      Complications: Dysfunctional Labor      Name: Ahmet      Apgar1: 9  Apgar5: 9   2 Term 19 39w6d 01:24 / 00:07 3 kg (6 lb 9.8 oz) F Vag-Spont None N TERRY      Name: Florina      Apgar1: 9  Apgar5: 9   1A Term 17 40w1d  3.345 kg (7 lb 6 oz) F Vag-Spont Nitrous  TERRY      Birth Comments: 20 min 2nd stage      Name: Sonya      Apgar1: 9  Apgar5: 9   1B                  History:   Past Medical History:   Diagnosis Date     Acute superficial venous thrombosis of lower extremity 2017    with varicose vein in pregnancy     Headache     tension type, comes and  "goes, infrequent     Hx of varicella     had chicken pox as a child     Preeclampsia     During second labor and birth      Past Surgical History:   Procedure Laterality Date     WISDOM TOOTH EXTRACTION        Family History   Problem Relation Age of Onset     Depression Mother         on meds     Alcoholism Father      Mental Illness Father      No Known Problems Sister      No Known Problems Sister      Depression Brother         sometimes on meds     Bipolar Disorder Brother      No Known Problems Brother      Breast Cancer Maternal Grandmother 60        mets to bone     Depression Maternal Grandmother      Arthritis Maternal Grandfather      Hearing Loss Maternal Grandfather      Alcoholism Maternal Grandfather      Vision Loss Paternal Grandmother      Alcoholism Paternal Grandfather      Hypertension Paternal Grandfather      Alcoholism Maternal Uncle      Depression Maternal Uncle      Alcoholism Paternal Aunt      Alcoholism Paternal Uncle      Diabetes Type 1 Paternal Uncle      Depression Maternal Aunt       Social History     Tobacco Use     Smoking status: Never     Smokeless tobacco: Never   Substance Use Topics     Alcohol use: Yes     Alcohol/week: 1.0 standard drink     Types: 1 Standard drinks or equivalent per week     Comment: Alcoholic Drinks/day: none while pregnant     Drug use: No      Current Outpatient Medications   Medication Sig Dispense Refill     ondansetron (ZOFRAN ODT) 8 MG ODT tab Take 1 tablet (8 mg) by mouth every 8 hours as needed for nausea 30 tablet 1      No Known Allergies     The patient's medical, surgical and family histories were reviewed and were not pertinent to this visit.     Pap smear: Last Pap: April 2022, Result: NILM/negative HPV, Next Due: April 2027.     EPDS score today: 0 .\"  0\" to #10   History of anxiety or depression: no    Review of Systems   General: Fatigue but otherwise denies problem   Eyes: Denies problem   Ears/Nose/Throat: Denies problem "   Cardiovascular: Denies problem   Respiratory: Denies problem   Gastrointestinal: Nausea without vomiting, otherwise negative   Genitourinary: Denies any discharge, vaginal bleeding or itchiness or any other problem   Musculoskeletal: Breast tenderness otherwise denies problem   Skin: Denies problem   Neurologic: Denies problem   Psychiatric: Denies problem   Endocrine: Denies problem   Heme/Lymphatic: Denies problem   Allergic/Immunologic: Denies problem         Objective:   Objective    Vitals:    02/09/23 1447   BP: 112/58   Pulse: 68   Weight: 55.3 kg (122 lb)        Physical Exam:   General Appearance: Alert, cooperative, no distress, appears stated age   MAEVE: Normocephalic, without obvious abnormality, atraumatic. Conjunctiva/corneas clear   Neck: Supple, symmetrical, trachea midline, no adenopathy.   Thyroid: not enlarged, symmetric, no tenderness/mass/nodules   Back: Symmetric, ROM normal  Lungs: Clear to auscultation bilaterally, respirations unlabored   Heart: Regular rate and rhythm, S1 and S2 normal, no murmur. No edema to lower extremities.   Breasts: Deferred  Abdomen: Gravid, soft, non-tender   FHT: 164 bpm  Pelvic exam: Deferred  Musculoskeletal: Extremities normal, atraumatic, no cyanosis   Skin: Skin color, texture, turgor normal, no rashes or lesions   Neurologic: Alert and oriented x 3. Normal speech

## 2023-02-10 PROBLEM — Z13.79 GENETIC SCREENING: Status: ACTIVE | Noted: 2023-02-10

## 2023-02-10 PROBLEM — Z34.80 SUPERVISION OF OTHER NORMAL PREGNANCY, ANTEPARTUM: Status: ACTIVE | Noted: 2023-02-10

## 2023-02-10 PROBLEM — Z87.59 HISTORY OF PRE-ECLAMPSIA: Status: ACTIVE | Noted: 2023-02-10

## 2023-02-10 PROBLEM — Z91.89 AT RISK FOR COMPLICATION OF PREGNANCY: Status: ACTIVE | Noted: 2023-02-10

## 2023-02-10 LAB
HBV SURFACE AG SERPL QL IA: NONREACTIVE
HCV AB SERPL QL IA: NONREACTIVE
HIV 1+2 AB+HIV1 P24 AG SERPL QL IA: NONREACTIVE
RUBV IGG SERPL QL IA: 1.13 INDEX
RUBV IGG SERPL QL IA: POSITIVE
T PALLIDUM AB SER QL: NONREACTIVE

## 2023-02-11 LAB — BACTERIA UR CULT: NO GROWTH

## 2023-02-16 LAB — SCANNED LAB RESULT: NORMAL

## 2023-03-09 ENCOUNTER — PRENATAL OFFICE VISIT (OUTPATIENT)
Dept: MIDWIFE SERVICES | Facility: CLINIC | Age: 33
End: 2023-03-09
Payer: COMMERCIAL

## 2023-03-09 VITALS — BODY MASS INDEX: 20.66 KG/M2 | DIASTOLIC BLOOD PRESSURE: 60 MMHG | WEIGHT: 128 LBS | SYSTOLIC BLOOD PRESSURE: 100 MMHG

## 2023-03-09 DIAGNOSIS — Z91.89 AT RISK FOR COMPLICATION OF PREGNANCY: ICD-10-CM

## 2023-03-09 DIAGNOSIS — Z34.80 SUPERVISION OF OTHER NORMAL PREGNANCY, ANTEPARTUM: Primary | ICD-10-CM

## 2023-03-09 DIAGNOSIS — Z87.59 HISTORY OF PRE-ECLAMPSIA: ICD-10-CM

## 2023-03-09 PROCEDURE — 99207 PR PRENATAL VISIT: CPT | Performed by: ADVANCED PRACTICE MIDWIFE

## 2023-03-09 RX ORDER — ASPIRIN 81 MG/1
81 TABLET ORAL DAILY
Status: ON HOLD | COMMUNITY
End: 2023-08-29

## 2023-03-09 NOTE — PROGRESS NOTES
Cayla presents with her  Davon and youngest child Ahmet.  Overall, feeling much better!  Nausea is much improved, and gaining weight most likely due to frequent nourishment which mitigates nausea of pregnancy.  No need for Zofran anymore.  She denies abdominal pain, loss of fluid or vaginal bleeding.  DECLINES a single marker AFP today.  20-week fetal anatomy ultrasound ordered to be performed prior to next CNM visit in 4 weeks.  Discussed ASA 81 mg due to history of preeclampsia, patient is taking.  Initial OB lab results reviewed and WNL.  Danger signs and symptoms reviewed.  All questions answered.  Encouraged to call or return to clinic with any questions, concerns, or as needed.

## 2023-04-06 ENCOUNTER — PRENATAL OFFICE VISIT (OUTPATIENT)
Dept: MIDWIFE SERVICES | Facility: CLINIC | Age: 33
End: 2023-04-06
Payer: COMMERCIAL

## 2023-04-06 ENCOUNTER — HOSPITAL ENCOUNTER (OUTPATIENT)
Dept: ULTRASOUND IMAGING | Facility: HOSPITAL | Age: 33
Discharge: HOME | End: 2023-04-06
Attending: ADVANCED PRACTICE MIDWIFE | Admitting: ADVANCED PRACTICE MIDWIFE
Payer: COMMERCIAL

## 2023-04-06 VITALS — DIASTOLIC BLOOD PRESSURE: 72 MMHG | WEIGHT: 137 LBS | BODY MASS INDEX: 22.11 KG/M2 | SYSTOLIC BLOOD PRESSURE: 106 MMHG

## 2023-04-06 DIAGNOSIS — Z91.89 AT RISK FOR COMPLICATION OF PREGNANCY: ICD-10-CM

## 2023-04-06 DIAGNOSIS — Z87.59 HISTORY OF PRE-ECLAMPSIA: ICD-10-CM

## 2023-04-06 DIAGNOSIS — O26.02 EXCESSIVE WEIGHT GAIN DURING PREGNANCY IN SECOND TRIMESTER: ICD-10-CM

## 2023-04-06 DIAGNOSIS — Z34.80 SUPERVISION OF OTHER NORMAL PREGNANCY, ANTEPARTUM: Primary | ICD-10-CM

## 2023-04-06 DIAGNOSIS — Z34.80 SUPERVISION OF OTHER NORMAL PREGNANCY, ANTEPARTUM: ICD-10-CM

## 2023-04-06 PROCEDURE — 99207 PR PRENATAL VISIT: CPT | Performed by: ADVANCED PRACTICE MIDWIFE

## 2023-04-06 PROCEDURE — 76805 OB US >/= 14 WKS SNGL FETUS: CPT

## 2023-04-06 RX ORDER — PRENATAL VIT/IRON FUM/FOLIC AC 27MG-0.8MG
TABLET ORAL
COMMUNITY
Start: 2023-03-13 | End: 2023-10-09

## 2023-04-06 NOTE — PROGRESS NOTES
"Cayla presents alone.  She just underwent her 20-week fetal anatomy ultrasound, results pending.  She denies loss of fluid, lower abdominal pain or vaginal bleeding.  Discussed single marker AFP, patient DECLINES.  When RTC, 1 hour GCT, hemoglobin and RPR, ABO/Rh a positive.  Total weight gain thus far: 17 pounds with pregravid BMI 19, more than expected.  Upon further questioning, \"I am not eating much more than usual.\"  Additionally, patient endorses gaining similarly with each of her 3 previous pregnancies.  Second trimester teaching completed.  Danger signs and symptoms reviewed.  All questions answered.  Encouraged to call or RTC with any questions, concerns, or as needed.  "

## 2023-04-07 ENCOUNTER — DOCUMENTATION ONLY (OUTPATIENT)
Dept: MIDWIFE SERVICES | Facility: CLINIC | Age: 33
End: 2023-04-07
Payer: COMMERCIAL

## 2023-04-07 DIAGNOSIS — Z34.80 SUPERVISION OF OTHER NORMAL PREGNANCY, ANTEPARTUM: Primary | ICD-10-CM

## 2023-05-06 NOTE — PROGRESS NOTES
Here with Davon for a routine prenatal visit at 24w2d. Reports normal fetal movements. Denies PTL including, regular painful contractions, bleeding, leaking or changes in fetal movement. Anatomy ultrasound reviewed.  Offered follow-up to reevaluate fetal profile not well seen, patient will consider and send a [facility] message if follow-up ultrasound is desired. Offers no questions or concerns. She has not been supplementing with oral iron but is aware that if her hemoglobin is low, oral iron will be recommended. Completing GCT, hgb, RPR today. Reviewed  labor precautions, warning signs and when/how to call the on-call CNM. Reviewed recommendation for Tdap for fetal protection of pertussis at next visit (given after 27w0d).

## 2023-05-08 ENCOUNTER — PRENATAL OFFICE VISIT (OUTPATIENT)
Dept: MIDWIFE SERVICES | Facility: CLINIC | Age: 33
End: 2023-05-08
Payer: COMMERCIAL

## 2023-05-08 VITALS
HEIGHT: 66 IN | DIASTOLIC BLOOD PRESSURE: 64 MMHG | SYSTOLIC BLOOD PRESSURE: 118 MMHG | BODY MASS INDEX: 22.34 KG/M2 | OXYGEN SATURATION: 98 % | WEIGHT: 139 LBS | HEART RATE: 80 BPM

## 2023-05-08 DIAGNOSIS — Z34.80 SUPERVISION OF OTHER NORMAL PREGNANCY, ANTEPARTUM: Primary | ICD-10-CM

## 2023-05-08 DIAGNOSIS — O99.810 ABNORMAL GLUCOSE AFFECTING PREGNANCY: Primary | ICD-10-CM

## 2023-05-08 LAB
GLUCOSE 1H P 50 G GLC PO SERPL-MCNC: 186 MG/DL (ref 70–129)
HGB BLD-MCNC: 12.2 G/DL (ref 11.7–15.7)

## 2023-05-08 PROCEDURE — 85018 HEMOGLOBIN: CPT | Performed by: ADVANCED PRACTICE MIDWIFE

## 2023-05-08 PROCEDURE — 82950 GLUCOSE TEST: CPT | Performed by: ADVANCED PRACTICE MIDWIFE

## 2023-05-08 PROCEDURE — 99207 PR PRENATAL VISIT: CPT | Performed by: ADVANCED PRACTICE MIDWIFE

## 2023-05-08 PROCEDURE — 36415 COLL VENOUS BLD VENIPUNCTURE: CPT | Performed by: ADVANCED PRACTICE MIDWIFE

## 2023-05-08 PROCEDURE — 86780 TREPONEMA PALLIDUM: CPT | Performed by: ADVANCED PRACTICE MIDWIFE

## 2023-05-08 NOTE — PATIENT INSTRUCTIONS
"\"We hope you had a positive experience and that you can definitely recommend [facility] to your family and friends. You ll be receiving a survey soon and we look forward to hearing your feedback\".    [facility]  Contact information:  Appointment line and to get a hold of CNM in clinic Monday-Friday 8 am - 5 pm:  [phone].  There are some clinics with early start times (1st appointment 7:40 am) and others with evening hours (last appointment 6:20 pm).  Most are typically open from 8 am to 5 pm.    CNM on call answering service: [phone].  Specify your hospital of choice and leave a brief message for CNM;  will then page CNM who is on call at your specified hospital and you should receive a call back with 15 minutes.  Be sure that your ringer is audible and that you can accept blocked calls so that we can get back in touch with you! This number should be reserved for urgent needs if during the day, before 8 am, after 5 pm, weekends, holidays.    Contact the on-call CNM with warning signs, such as:  vaginal bleeding   Vaginal discharge and itching or pain and burning during urination  Leg/calf pain or swelling on one side  severe abdominal pain  nausea and vomiting more than 4-5 times a day, or if you are unable to keep anything down  fever more than 100.4 degrees F.     startuplyhart  After each of your visits you are welcome to check kalidea for your visit summary including education and links to information relevant to your pregnancy and/or well woman care.   Find the \"Visits\" tab at the top of the page, you will see a list of recent visits and for each visit a for link for \"View After Visit Summary.\" View of your After Visit Summary will allow you to read our recommendations from your visit, review any education provided, and link to websites with useful information.   If you have any questions or difficulty navigating [employer], please feel free to " "contact us and we will do our best to direct you.     Meet the Midwives from [facility]  You are invited to an informational meet and greet with [facility]'s [facility] Certified Nurse-Midwives. Our free \"Meet the Midwives\" event is a great opportunity to learn about our midwives' philosophy and experience, the hospitals where we can assist with your birth, and answer questions you may have. Partners, friends, and family are welcome to attend. Currently, this is a virtual event.  Date  Last Thursday of every month at 7 pm.    Link to next (live) meeting  [email or web address]  To Join by Telephone (audio only) Call:   [phone] Phone Conference ID: [phone] #        Touring the Maternity Care [facility] Maternity Care:   https://[facility].org/locations/the-birthplace-[facility] M[facility]:   https://[facility].org/locations/[facility]    Scroll to the bottom of this page if the above link does not work.      Pre-register after 30 weeks online at the hospital where your baby will be born [email or web address]      Baby Feeding in the Hospital: Information, Support and Resources    As you prepare for the birth of your child, you will want to consider options for feeding your baby including breast-feeding and/or baby formula. The American Academy of Pediatrics recommends exclusive breast-feeding for the first six months (although any amount of breast-feeding is beneficial).  However, we also understand that breast-feeding is a personal choice and not for everyone. Whether or not you choose to breast-feed, your decision will be respected by our staff.    There are numerous benefits of breast-feeding; here are a few to consider:  Provides antibodies to protect your baby from infections and diseases  The cost: " formula can cost over $1,500 per year  Convenience, no warming up or sterilizing bottles and supplies  The physical contact with breastfeeding can make babies feel secure, warm and comforted    What ever my feeding choice, what can I expect after I deliver my baby?  Your baby will usually be placed skin-to-skin immediately following birth. The skin to skin contact between you and your baby will be a special and memorable time. The bonding and attachment comforts your baby and has a positive effect on baby s brain development.   Having your baby  room in  with you also helps you start to learn your baby s body rhythms and sleep cycle.    You will also begin to learn your baby s cues (signals) that he or she is ready to feed.    When do I start to feed my baby?  As soon as possible after your baby s birth, you will be encouraged to begin feeding.  In the first couple of weeks, your baby will eat often.  Breastfeeding babies usually eat at least 8 times in 24 hours.  Babies fed formula usually eat at least 7 times in 24 hours.      Breast-feeding tips:  Get comfortable and use pillows for support.  Have your baby at the level of your breast, facing you,  tummy to tummy .    Touch your nipple to your baby s lips so you baby s mouth opens wide (rooting reflex).  Aim the nipple toward the roof of your baby s mouth. When your baby opens his or her mouth, pull your baby toward your breast to help your baby  latch on  to your nipple and much of the areola area.  Hand expressing your breast milk can assist with latching your baby to your breast, if needed.  Ask for help, breastfeeding may seem awkward or uncomfortable at first, this is normal. There are numerous resources available at [facility] Nurse [facility] [facility]), Clinics and beyond.   If your goal is to exclusively breastfeed, avoid using any formula or artificial nipples (including bottles and pacifiers) while you are your  baby are learning to breastfeed unless there is a medical reason.     Mixing breastfeeding and formula can interfere with how you begin building your milk supply.  It can impact how you and your baby  learn  to breastfeeding together and alter the natural growth of  good  bacteria in your baby s stomach.  Delay a pacifier or a bottle in the first few weeks until breastfeeding is well established. This is often around 3 weeks of age.  Ask your nurse to show you how to hand express.   Breast milk can be kept in the refrigerator or freezer for later use.    Hospital and Clinic Breastfeeding Resources:  -Schedule an appointment with a [facility] [place] [facility] CNM who is also a Lactation Consultant by calling [phone]     -Schedule a clinic appointment with a [facility] [place] [facility] CNM with dedicated clinic hours for breastfeeding assistance by calling [phone]. Breastfeeding clinic visits are at [facility] on , [facility] on  and [facility] on .     New Parent Connection:   [facility], [address] [place] [address]  In-person meetings on  from 6 pm - 7:15 pm for parents of  to 9 months, at the same site.   All are free, drop-in, no registration required.    There are also free virtual meetings ongoing on :  11:30 am - 12:30 pm for parents of newborns to 3 months  4:15 pm to 5:15 pm for parents of 3 to 9-month olds  For joining info parents should call Tamy Curry at [phone]      [facility] Baby Café  More information  Cecily Rolle  [phone]  [email or web address].     -Attend a baby weigh in at [facility].  Lactation consultants are available to answer questions  [place]:  1:00 - 2:00  [facility]:  1:00 - 2:00  www.Gideros Mobile.PhotoBox    -Attend one of the New Mama groups at [facility] in [facility].  [facility] also offers one-on-one  in home and in office lactation consults.   www.enlightenedmama.com    -Attend a Shan Valle meeting.  Multiple groups in several locations throughout the [facility]. The meetings are no-cost and always informative breastfeeding education session through Internatal La Leche League  Www.basil.org/  Medication use while breastfeeding: http://toxnet.nlm.nih.gov/newtoxnet/lactmed.htm     Online Resources:  Breastfeedingmadesimple.com  Llli.org (La Leche League)  Normalfed.com  Womenshealth.gov/breastfeeding  Workandpump.com    Breast-feeding Supplies & Pumps:  Talk to your insurance provider or WIC (Women, Infants and Children) to learn more about options available to you. Recent health insurance changes may include additional coverage for supplies and pumps.    Public Health:  Women, Infants and Children Nutrition program (WIC): provides breast-feeding support and education in addition to formal feeding moms. 220-XRK-9886 or http://www.health.[facility].us/divs/fh/wic    Family Health Home Visiting: Public Health Nurse home visits are available. Talk to your provider to see if you qualify. Most [facility] have a program available.    Additional Resources:  La Leche League is an international, nonprofit, nonsectarian organization offering information, education, and support to mothers who want to breast-feed their babies. Local groups offer phone help and monthly meetings. Visit Phraxis.Gro or CompassMD.Gro and us the  Find local support  drop down menu or click on the  Resources  tab.    [place] Breastfeeding Resources: 4-109-230-BABY (2229) toll free    National Breastfeeding Help Line trained breastfeeding peer counselors can help answer common breast-feeding questions by phone. Monday-Friday: English/French  [phone]- [phone] toll free, 1-720.800.8115 (TTY)      Childbirth and Parenting Education:       Everyday Miracles:   https://www.everyday-miracles.org/    Free Video Series from [facility]:  https://nursing.[facility]/academics/specialty-areas/nurse-midwifery/having-baby-prenatal-videos/having-baby-prenatal-and    Childbirth Education virtual (live) classes: www.Foldax/classes  The Birth Hour: https://Stream/online-childbirth-class/  BirthED: https://www.birthedmn.com/  [place] parenting center: http://[facility] HospitalHumanCentric Performance/   (956) 425-LDYQ  [place]: (education, yoga & wellness) www.SiteJabber  Enlightened Mama: www.enlightenedmama.Diary.com   Childbirth collective: (Parent topic nights)  www.childbirthcollective.org/  Hypnobabies:  www.Clipmarks.Diary.com/  Hypnobirthing:  Http://Emu Solutions.Diary.com/  Hypnobirthing virtual class: www.SIGKAT/hypnobirthing    Information about doulas:  Childbirth collective: http://www.childbirthcollective.org/  Doulas of [place] (SCOTT):  www.scott.org  [facility]  project: http://twincitiesdoulaproject.Diary.com/     Early Childhood and Family Education (ECFE):  ECFE offers parents hands-on learning experiences that will nourish a lifetime of teachable moments.  http://ecfe.info/ecfe-home/    APPS and Podcasts:   Mary Bonnerture    Evidence Based Birth  The Birth Hour (for birth stories)   Birthful   Expectful   The Longest Shortest Time  PregnancyPodccorinne García    Book Recommendations:   Latoya Dunnigan's Birthing From Within--first few chapters include a new-age tone, you may prefer to skip it and keep going, because there is good stuff later.  This book recommendation covers emotional preparation, but does cover coping with pain, and use of both pharmacological and nonpharmacological methods.    Dr. Pena' The Pregnancy Book and The Birth Book--the pregnancy book goes month-by month    The Birth Partner by Laura Garcia    Womanly Art of Breastfeeding by La Leche League International   Bestfeeding by Fariba Ramos--great pictures    Mothering Your Nursing Toddler, by Karine Medrano.   Addresses dealing with so many of  "the challenging behaviors of a nursing toddler.  How Weaning Happens, by La Leche League.  Discusses weaning at all ages, from medically necessary weaning of an infant, all the way up to age 5 (or older), with why/why not, and strategies.  Very empowering book both for deciding to wean and deciding not to.    American College of Nurse-Midwives (ACNM) http://www.midwife.org/; look at the informational handouts at http://www.midwife.org/Share-With-Women     www.mymidwife.org    Mother to Baby (Medication and Herbal guidance in pregnancy): http://www.mothertobaby.org  Toll-Free Hotline: 192.714.1782  LactMed (Medication use while breastfeeding): http://toxnet.nlm.nih.gov/newtoxnet/lactmed.htm    Women's Health.gov:  http://www.womenshealth.gov/a-z-topics/index.html    American pregnancy association - http://americanpregnancy.org    Centering Pregnancy (group prenatal care option): http://centeringhealthcare.org    March of Dimes www.Merge.rs AG.com     FDA - Nutrition  www.mypyramid.gov  Under \"For Consumers,\" click on \"pregnant and breastfeeding women.\"      Centers for Disease Control and Prevention (CDC) - Vaccines : http://www.cdc.gov/vaccines/       When researching information on the web, question the validity of websites.  [employer] .gov, .edu and.org tend to be more reliable information.  If there are a lot of advertisements, be cautious of the information provided. Stay away from blogs and chat rooms please!     Virtual Breastfeeding Support:    During this time of isolation, breastfeeding families need even more community!  Here are some area organizations offering virtual support groups for breastfeeding:    [facility] Cafe Support Group, Tuesdays at 10:30 am   Run by JUANITA Bhardwaj of The Baby Whisperer Lactation Consultants   Go to The Baby Whisperer Lactation Consultants Facebook page and click on \"events\" for link   https://www.Monetsu.com/events/088809910932009/  [facility] Milk Hour, "  at 2:30 pm    Run by JUANITA Jones   Go to [facility] FB page and send message to get link   https://www.JinggaMall.com.Actionsoft/[facility] CentermPay Gatewayund[facility]/  Aarti Valle [facility]/[place] holding virtual meetings the first Tuesday of each month, 8-9 pm, and the   Third Saturday, 10 - 11 am.  Go to La Leche [facility] and [place] FB page; message to get link https://www.JinggaMall.com.Actionsoft/LLLofGoldTom/?hc_location=[facility]  [place] offers a Lactation Lounge every Friday 12pm - 1pm, run by Aarti Cazares Leader   Sign up via link at https://www.51 Auto/cbe-lactation  [facility] is offering virtual support groups every Monday, 10:30 am - 12 pm, run by nurse JUANITA   Https://www.JinggaMall.[email or web address]/events/[email or web address]/    Prenatal Breastfeeding Classes:      Zak is offering virtual breastfeeding and  care classes:  https://www.51 Auto/education-workshops  BirthEd childbirth and breastfeeding education offering virtual prenatal breastfeeding classes  https://www.birthedmn.com/workshops

## 2023-05-09 LAB — T PALLIDUM AB SER QL: NONREACTIVE

## 2023-05-17 ENCOUNTER — LAB (OUTPATIENT)
Dept: LAB | Facility: CLINIC | Age: 33
End: 2023-05-17
Payer: COMMERCIAL

## 2023-05-17 DIAGNOSIS — O99.810 ABNORMAL GLUCOSE AFFECTING PREGNANCY: ICD-10-CM

## 2023-05-17 LAB
GESTATIONAL GTT 1 HR POST DOSE: 161 MG/DL (ref 60–179)
GESTATIONAL GTT 2 HR POST DOSE: 127 MG/DL (ref 60–154)
GESTATIONAL GTT 3 HR POST DOSE: 101 MG/DL (ref 60–139)
GLUCOSE P FAST SERPL-MCNC: 80 MG/DL (ref 60–94)

## 2023-05-17 PROCEDURE — 82952 GTT-ADDED SAMPLES: CPT

## 2023-05-17 PROCEDURE — 36415 COLL VENOUS BLD VENIPUNCTURE: CPT

## 2023-05-17 PROCEDURE — 82951 GLUCOSE TOLERANCE TEST (GTT): CPT

## 2023-06-07 ENCOUNTER — PRENATAL OFFICE VISIT (OUTPATIENT)
Dept: MIDWIFE SERVICES | Facility: CLINIC | Age: 33
End: 2023-06-07
Payer: COMMERCIAL

## 2023-06-07 VITALS
BODY MASS INDEX: 23.57 KG/M2 | HEART RATE: 76 BPM | WEIGHT: 146 LBS | DIASTOLIC BLOOD PRESSURE: 60 MMHG | SYSTOLIC BLOOD PRESSURE: 104 MMHG

## 2023-06-07 DIAGNOSIS — Z34.80 SUPERVISION OF OTHER NORMAL PREGNANCY, ANTEPARTUM: Primary | ICD-10-CM

## 2023-06-07 PROCEDURE — 90471 IMMUNIZATION ADMIN: CPT | Performed by: ADVANCED PRACTICE MIDWIFE

## 2023-06-07 PROCEDURE — 90715 TDAP VACCINE 7 YRS/> IM: CPT | Performed by: ADVANCED PRACTICE MIDWIFE

## 2023-06-07 PROCEDURE — 99207 PR PRENATAL VISIT: CPT | Performed by: ADVANCED PRACTICE MIDWIFE

## 2023-06-07 NOTE — NURSING NOTE
Prior to immunization administration, verified patients identity using patient s name and date of birth. Please see Immunization Activity for additional information.     Screening Questionnaire for Adult Immunization    Are you sick today?   No   Do you have allergies to medications, food, a vaccine component or latex?   No   Have you ever had a serious reaction after receiving a vaccination?   No   Do you have a long-term health problem with heart, lung, kidney, or metabolic disease (e.g., diabetes), asthma, a blood disorder, no spleen, complement component deficiency, a cochlear implant, or a spinal fluid leak?  Are you on long-term aspirin therapy?   No   Do you have cancer, leukemia, HIV/AIDS, or any other immune system problem?   No   Do you have a parent, brother, or sister with an immune system problem?   No   In the past 3 months, have you taken medications that affect  your immune system, such as prednisone, other steroids, or anticancer drugs; drugs for the treatment of rheumatoid arthritis, Crohn s disease, or psoriasis; or have you had radiation treatments?   No   Have you had a seizure, or a brain or other nervous system problem?   No   During the past year, have you received a transfusion of blood or blood    products, or been given immune (gamma) globulin or antiviral drug?   No   For women: Are you pregnant or is there a chance you could become       pregnant during the next month?   Yes pregnant   Have you received any vaccinations in the past 4 weeks?   No     Immunization questionnaire answers were at least one positive answer. Notified Sherri Casillas CNM.      Injection of Tdap given by Mariah Harris LPN. Patient instructed to remain in clinic for 15 minutes afterwards, and to report any adverse reactions.     Screening performed by Mariah Harris LPN on 6/7/2023 at 2:43 PM.

## 2023-06-07 NOTE — PROGRESS NOTES
Cayla presents with a josue Leo for a routine PNV at 28w6d.  Feeling overall good.  Discussed:  1.) Updated cnm details  2.) Taking iron daily in prenatal vitamin.  Hgb 12.2 g/dL.    Reviewed maternal growth chart.    Tdap today.  RTC 4 weeks, sooner as needed.    Has CNM numbers and aware to call with DFM, PTL, LOF or any questions or concerns.      NELDA Ibrahim CNM    6/7/2023  2:37 PM

## 2023-06-07 NOTE — PATIENT INSTRUCTIONS
"\"We hope you had a positive experience and that you can definitely recommend [facility] to your family and friends. You ll be receiving a survey soon and we look forward to hearing your feedback\".    [facility]  Contact information:  Appointment line and to get a hold of CNM in clinic Monday-Friday 8 am - 5 pm:  [phone].  There are some clinics with early start times (1st appointment 7:40 am) and others with evening hours (last appointment 6:20 pm).  Most are typically open from 8 am to 5 pm.    CNM on call answering service: [phone].  Specify your hospital of choice and leave a brief message for CNM;  will then page CNM who is on call at your specified hospital and you should receive a call back with 15 minutes.  Be sure that your ringer is audible and that you can accept blocked calls so that we can get back in touch with you! This number should be reserved for urgent needs if during the day, before 8 am, after 5 pm, weekends, holidays.    Contact the on-call CNM with warning signs, such as:  vaginal bleeding   Vaginal discharge and itching or pain and burning during urination  Leg/calf pain or swelling on one side  severe abdominal pain  nausea and vomiting more than 4-5 times a day, or if you are unable to keep anything down  fever more than 100.4 degrees F.     InOpenhart  After each of your visits you are welcome to check Chicfy for your visit summary including education and links to information relevant to your pregnancy and/or well woman care.   Find the \"Visits\" tab at the top of the page, you will see a list of recent visits and for each visit a for link for \"View After Visit Summary.\" View of your After Visit Summary will allow you to read our recommendations from your visit, review any education provided, and link to websites with useful information.   If you have any questions or difficulty navigating Qiandao, please feel free to " "contact us and we will do our best to direct you.     Meet the Midwives from [facility]  You are invited to an informational meet and greet with [facility] Nurse-Midwives. Our free \"Meet the Midwives\" event is a great opportunity to learn about our midwives' philosophy and experience, the hospitals where we can assist with your birth, and answer questions you may have. Partners, friends, and family are welcome to attend. Currently, this is a virtual event.  Date  Last Thursday of every month at 7 pm.    Link to next (live) meeting  [email or web address].[email or web address]/meet-the-midwives  To Join by Telephone (audio only) Call:   [phone] Phone Conference ID: [phone] #        Touring the Maternity Care [facility] Maternity Care:   https://[facility].org/locations/the-birthplace-at[facility] Maternity Care:   https://[facility].org/locations/the-[facility]place-[facility]    Scroll to the bottom of this page if the above link does not work.      Pre-register after 30 weeks online at the hospital where your baby will be born [email or web address]://[email or web address].[email or web address].[email or web address]/preregistration/he.asp      Baby Feeding in the Hospital: Information, Support and Resources    As you prepare for the birth of your child, you will want to consider options for feeding your baby including breast-feeding and/or baby formula. The American Academy of Pediatrics recommends exclusive breast-feeding for the first six months (although any amount of breast-feeding is beneficial).  However, we also understand that breast-feeding is a personal choice and not for everyone. Whether or not you choose to breast-feed, your decision will be respected by our staff.    There are numerous benefits of breast-feeding; here are a few to consider:  Provides antibodies to protect your baby from infections and diseases  The cost: " formula can cost over $1,500 per year  Convenience, no warming up or sterilizing bottles and supplies  The physical contact with breastfeeding can make babies feel secure, warm and comforted    What ever my feeding choice, what can I expect after I deliver my baby?  Your baby will usually be placed skin-to-skin immediately following birth. The skin to skin contact between you and your baby will be a special and memorable time. The bonding and attachment comforts your baby and has a positive effect on baby s brain development.   Having your baby  room in  with you also helps you start to learn your baby s body rhythms and sleep cycle.    You will also begin to learn your baby s cues (signals) that he or she is ready to feed.    When do I start to feed my baby?  As soon as possible after your baby s birth, you will be encouraged to begin feeding.  In the first couple of weeks, your baby will eat often.  Breastfeeding babies usually eat at least 8 times in 24 hours.  Babies fed formula usually eat at least 7 times in 24 hours.      Breast-feeding tips:  Get comfortable and use pillows for support.  Have your baby at the level of your breast, facing you,  tummy to tummy .    Touch your nipple to your baby s lips so you baby s mouth opens wide (rooting reflex).  Aim the nipple toward the roof of your baby s mouth. When your baby opens his or her mouth, pull your baby toward your breast to help your baby  latch on  to your nipple and much of the areola area.  Hand expressing your breast milk can assist with latching your baby to your breast, if needed.  Ask for help, breastfeeding may seem awkward or uncomfortable at first, this is normal. There are numerous resources available at [facility] [facility]), Clinics and beyond.   If your goal is to exclusively breastfeed, avoid using any formula or artificial nipples (including bottles and pacifiers) while you are your  baby are learning to breastfeed unless there is a medical reason.     Mixing breastfeeding and formula can interfere with how you begin building your milk supply.  It can impact how you and your baby  learn  to breastfeeding together and alter the natural growth of  good  bacteria in your baby s stomach.  Delay a pacifier or a bottle in the first few weeks until breastfeeding is well established. This is often around 3 weeks of age.  Ask your nurse to show you how to hand express.   Breast milk can be kept in the refrigerator or freezer for later use.    Hospital and Clinic Breastfeeding Resources:  -Schedule an appointment with a [facility] CNM who is also a Lactation Consultant by calling [phone]     -Schedule a clinic appointment with a [facility] CNM with dedicated clinic hours for breastfeeding assistance by calling [phone]. Breastfeeding clinic visits are at [facility] on , [facility] on  and [facility] on .     New Parent Connection:   [facility], [address] [place] [address]  In-person meetings on  from 6 pm - 7:15 pm for parents of  to 9 months, at the same site.   All are free, drop-in, no registration required.    There are also free virtual meetings ongoing on :  11:30 am - 12:30 pm for parents of newborns to 3 months  4:15 pm to 5:15 pm for parents of 3 to 9-month olds  For joining info parents should call Tamy Curry at [phone]      [facility] Baby Café  More information  Cecily Rolle  [phone]  [email or web address].     -Attend a baby weigh in at [facility].  Lactation consultants are available to answer questions  [place]:  1:00 - 2:00  [facility]:  1:00 - 2:00  www.[employer].TerraPass    -Attend one of the New Mama groups at [facility] in [facility].  [facility] also offers one-on-one  in home and in office lactation consults.   www.enlightenedmama.com    -Attend a Shan Valle meeting.  Multiple groups in several locations throughout the [place] [facility]. The meetings are no-cost and always informative breastfeeding education session through Internatal La Leche League  Www.basil.org/  Medication use while breastfeeding: http://toxnet.nlm.nih.gov/newtoxnet/lactmed.htm     Online Resources:  Breastfeedingmadesimple.com  Llli.org (La Leche League)  Normalfed.com  Womenshealth.gov/breastfeeding  Workandpump.com    Breast-feeding Supplies & Pumps:  Talk to your insurance provider or WIC (Women, Infants and Children) to learn more about options available to you. Recent health insurance changes may include additional coverage for supplies and pumps.    Public Health:  Women, Infants and Children Nutrition program (WIC): provides breast-feeding support and education in addition to formal feeding moms. 331-BRD-2599 or http://www.health.[facility].us/divs/fh/wic    Family Health Home Visiting: Public Health Nurse home visits are available. Talk to your provider to see if you qualify. Most [facility] have a program available.    Additional Resources:  La Leche League is an international, nonprofit, nonsectarian organization offering information, education, and support to mothers who want to breast-feed their babies. Local groups offer phone help and monthly meetings. Visit OrderAhead.Gdd Hcanalytics or Ambient [employer].Gdd Hcanalytics and us the  Find local support  drop down menu or click on the  Resources  tab.    [place] Breastfeeding Resources: 8-116-983-BABY (2229) toll free    National Breastfeeding Help Line trained breastfeeding peer counselors can help answer common breast-feeding questions by phone. Monday-Friday: English/Malay  6-727- [phone] toll free, [phone] (TTY)      Childbirth and Parenting Education:       Everyday Miracles:   https://www.everyday-miracles.org/    Free Video Series from [facility]:  https://nursing.[facility]/academics/specialty-areas/nurse-midwifery/having-baby-prenatal-videos/having-baby-prenatal-and    Childbirth Education virtual (live) classes: www.[place]/classes  The Birth Hour: https://Go-Page [employer] Media/online-childbirth-class/  BirthED: https://www.birthedmn.com/  [facility]: http://[facility] Seacoastweartolook/   (549) 168-BABY  [place]: (education, yoga & wellness) www.SurvmetricsaMailInBlack  Enlightened Mama: www.enlightenedmama.Robotics Inventions   Childbirth collective: (Parent topic nights)  www.childbirthcollective.org/  Hypnobabies:  www.legalPAD.[employer]otics Inventions/  Hypnobirthing:  Http://Black Lotus.Robotics Inventions/  Hypnobirthing virtual class: wwwTruantToday/hypnobirthing    Information about doulas:  Childbirth collective: http://www.childbirthcollective.org/  Doulas of [place] (SCOTT):  www.scott.org  [facility]  project: http://twincitiesdoulaproject.Robotics Inventions/     Early Childhood and Family Education (ECFE):  ECFE offers parents hands-on learning experiences that will nourish a lifetime of teachable moments.  http://ecfe.info/ecfe-home/    APPS and Podcasts:   Mary Villagomez    Evidence Based Birth  The Birth Hour (for birth stories)   Birthful   Expectful   The Longest Shortest Time  PregnancyPodcast Berenice García    Book Recommendations:   Latoya Rye's Birthing From Within--first few chapters include a new-age tone, you may prefer to skip it and keep going, because there is good stuff later.  This book recommendation covers emotional preparation, but does cover coping with pain, and use of both pharmacological and nonpharmacological methods.    Guide to Childbirth by Almita Mcmahan  Childbirth Without Fear by Stacy Lizarraga Read    Dr. Pena' The Pregnancy Book and The Birth Book--the pregnancy book goes month-by month    The Birth Partner by Laura Garcia    Womanly Art of Breastfeeding by La Leche League International   Bestfeeding by Fariba Ramos--great  "pictures    Mothering Your Nursing Toddler, by Karine Medrano.   Addresses dealing with so many of the challenging behaviors of a nursing toddler.  How Weaning Happens, by La Leche League.  Discusses weaning at all ages, from medically necessary weaning of an infant, all the way up to age 5 (or older), with why/why not, and strategies.  Very empowering book both for deciding to wean and deciding not to.    American College of Nurse-Midwives (ACNM) http://www.midwife.org/; look at the informational handouts at http://www.midwife.org/Share-With-Women     www.mymidwife.org    Mother to Baby (Medication and Herbal guidance in pregnancy): http://www.mothertobaby.org  Toll-Free Hotline: 105.336.9838  LactMed (Medication use while breastfeeding): http://toxnet.nlm.nih.gov/newtoxnet/lactmed.htm    Women's Health.gov:  http://www.womenshealth.gov/a-z-topics/index.html    American pregnancy association - http://americanpregnancy.org    Centering Pregnancy (group prenatal care option): http://centeringhealthcare.org    March of Dimes www.Talima Therapeutics.Diagnostic Photonics     FDA - Nutrition  www.mypyramid.gov  Under \"For Consumers,\" click on \"pregnant and breastfeeding women.\"      Centers for Disease Control and Prevention (CDC) - Vaccines : http://www.cdc.gov/vaccines/       When researching information on the web, question the validity of websites.  The domains .gov, .edu and.org tend to be more reliable information.  If there are a lot of advertisements, be cautious of the information provided. Stay away from blogs and chat rooms please!     Virtual Breastfeeding Support:    During this time of isolation, breastfeeding families need even more community!  Here are some area organizations offering virtual support groups for breastfeeding:    Latch Cafe Support Group, Tuesdays at 10:30 am   Run by JUANITA Bhardwaj of [employer] Baby Whisperer Lactation Consultants   Go to The Baby Whisperer Lactation Consultants Facebook page and click on " "\"events\" for link   https://www.[employer].com/events/[email or web address]/  [facility] Milk Hour,  at 2:30 pm    Run by JUANITA Jones   Go to [facility] FB page and send message to get link   https://www.[employer].com/healthfoundations/  [facility]/[place] holding virtual meetings the first Tuesday of each month, 8-9 pm, and the   Third Saturday, 10 - 11 am.  Go to [facility] and [place] FB page; message to get link https://www.[facility] [facility].com/LLLofGoldTom/?hc_location=[facility]  Zak offers a Lactation Lounge every Friday 12pm - 1pm, run by Negin Cazares[facility] Leader   Sign up via link at https://www.Lucid Holdings/cbe-lactation  [facility] is offering virtual support groups every Monday, 10:30 am - 12 pm, run by nurse IBCLC   Https://www.[facility] [facility].com/events/[email or web address]/    Prenatal Breastfeeding Classes:      Zak is offering virtual breastfeeding and  care classes:  https://www.L[employer] Holdings/education-workshops  BirthEd childbirth and breastfeeding education offering virtual prenatal breastfeeding classes  https://www.birthedmn.com/workshops   "

## 2023-07-13 ENCOUNTER — PRENATAL OFFICE VISIT (OUTPATIENT)
Dept: MIDWIFE SERVICES | Facility: CLINIC | Age: 33
End: 2023-07-13
Payer: COMMERCIAL

## 2023-07-13 VITALS — BODY MASS INDEX: 24.69 KG/M2 | WEIGHT: 153 LBS | DIASTOLIC BLOOD PRESSURE: 62 MMHG | SYSTOLIC BLOOD PRESSURE: 104 MMHG

## 2023-07-13 DIAGNOSIS — Z91.89 AT RISK FOR COMPLICATION OF PREGNANCY: ICD-10-CM

## 2023-07-13 DIAGNOSIS — O26.02 EXCESSIVE WEIGHT GAIN DURING PREGNANCY IN SECOND TRIMESTER: ICD-10-CM

## 2023-07-13 DIAGNOSIS — Z34.80 SUPERVISION OF OTHER NORMAL PREGNANCY, ANTEPARTUM: Primary | ICD-10-CM

## 2023-07-13 DIAGNOSIS — Z87.59 HISTORY OF PRE-ECLAMPSIA: ICD-10-CM

## 2023-07-13 PROCEDURE — 99207 PR PRENATAL VISIT: CPT | Performed by: ADVANCED PRACTICE MIDWIFE

## 2023-07-13 NOTE — PROGRESS NOTES
Cayla presents with her  Davon.  All is well!  EPDS: , 0 to #10.  She does not endorse headache, visual disturbances, right upper quadrant abdominal pain.  She reports normal fetal movement without regular uterine contractions, loss of fluid or vaginal bleeding.  She believes her total weight gain is slightly above where she would like it to be.  Total weight gain thus far is 33 pounds with pregravid BMI of 19.  Next visit: GBS RV culture and hemoglobin.  Written information given regarding GBS, perineal massage, water for labor and birth, nitrous oxide, VTE, lactation resources, postpartum depression/anxiety, wellbeing, and depression/anxiety during/after pregnancy.   labor precautions and danger signs and symptoms reviewed.  All questions answered.  Encouraged daily fetal movement counting and to call or return to clinic with any questions, concerns, or as needed.  
61M with herniated discs, primary lateral sclerosis (ambulates with a cane) who presents with abdominal pain.   Found to have diverticulitis on CT.      Diverticulitis - appendicitis unlikely as per surgery  - admitted to medicine  - surgery will follow and eval tomorrow  - cont with zosyn   - IV fluids  - NPO (if surgery is deemed necessary)  - pain control    Back pain  - cont with cymbalta  - tylenol PRM    Preventive measures  - no AC pending procedure  - SCD

## 2023-07-23 NOTE — PROGRESS NOTES
Cayla Lerner is a 33 year old  at 36w1d, presenting today with SRIDHAR for routine OB care.  Concerns: None, feels great and just waiting for baby  No vaginal bleeding, LOF, contractions.  No HA, RUQ pain, N/V, visual changes.  GBS done today. Position verified by SVE, sutures palpable.  Labor precautions discussed.  GBS today. Requests hgb next visit due to time constraint today.   RTC 1 week.      NELDA Klein CNM

## 2023-07-23 NOTE — PATIENT INSTRUCTIONS
"\"We hope you had a positive experience and that you can definitely recommend [facility] to your family and friends. You ll be receiving a survey soon and we look forward to hearing your feedback\".    [facility]  - Contact information:  Appointment line and to get a hold of CNM in clinic Monday-Friday 8 am - 5 pm:  [phone].  There are some clinics with early start times (1st appointment 7:40 am) and others with evening hours (last appointment 6:20 pm).  Most are typically open from 8 am to 5 pm.    CNM on call answering service: [phone].  Specify your hospital of choice and leave a brief message for CNM;  will then page CNM who is on call at your specified hospital and you should receive a call back with 15 minutes.  Be sure that your ringer is audible and that you can accept blocked calls so that we can get back in touch with you! This number should be reserved for urgent needs if during the day, before 8 am, after 5 pm, weekends, holidays.    Contact the on-call CNM with warning signs, such as:  vaginal bleeding   Vaginal discharge and itching or pain and burning during urination  Leg/calf pain or swelling on one side  severe abdominal pain  nausea and vomiting more than 4-5 times a day, or if you are unable to keep anything down  fever more than 100.4 degrees F.     EnhanCVhart  After each of your visits you are welcome to check Friendshippr for your visit summary including education and links to information relevant to your pregnancy and/or well woman care.   Find the \"Visits\" tab at the top of the page, you will see a list of recent visits and for each visit a for link for \"View After Visit Summary.\" View of your After Visit Summary will allow you to read our recommendations from your visit, review any education provided, and link to websites with useful information.   If you have any questions or difficulty navigating LXSN, please feel free to " "contact us and we will do our best to direct you.  Meet the Midwives from [facility]  You are invited to an informational meet and greet with [facility]'s [facility] Certified Nurse-Midwives. Our free \"Meet the Midwives\" event is a great opportunity to learn about our midwives' philosophy and experience, the hospitals where we can assist with your birth, and answer questions you may have. Partners, friends, and family are welcome to attend. Currently, this is a virtual event.  Date  Last Thursday of every month at 7 pm.    Link to next (live) meeting  [email or web address]  To Join by Telephone (audio only) Call:   [phone] Phone Conference ID: [phone] #    Touring the Maternity Care Center  At this time we are offering a virtual tour of the Maternity Care Centers at both [facility] and [facility]:   [facility] Maternity Care:   [email or web address]/the-[email or web address]-at--[facility] Maternity Care:   [email or web address].Konjekt/locations/the-birthplace-[facility]    Scroll to the bottom of this hyperlink if the above link does not work      Postpartum Depression  The first weeks of caring for a  baby are more than a full-time job. Although it is often a happy time, your feelings and moods may not be what you expected. Many women experience  baby blues.  Here are some tips to help you understand when feelings of sadness are normal, and when you should call your health care provider.    What are the baby blues?  As many as 3 of every 4 women will have short periods of mood swings, crying, or feeling cranky or restless during the first weeks after birth. These feelings can be worse when you are tired or anxious. Women who have the baby blues often say they feel like crying but don t know why. Baby blues usually happen in the first or second week " postpartum (after you give birth) and last less than a week. If you are not sleeping, becoming more upset, don t feel like you can take care of your baby, or your sadness lasts 2 weeks or more, call your health care provider.    What is postpartum depression?  About one in every 5 women will develop depression during the first few months postpartum that may be mild, moderate, or severe. Women who have postpartum depression may have some of these symptoms:  Feeling guilty   Not able to enjoy your baby and feeling like you are not bonding with your baby    Not able to sleep, even when the baby is sleeping  Sleeping too much and feeling too tired to get out of bed  Feeling overwhelmed and not able to do what you need to during the day  Not able to concentrate  Don t feel like eating  Feeling like you are not normal or not yourself anymore  Not able to make decisions  Feeling like a failure as a mother  Feeling lonely or all alone  Thinking your baby might be better off without you  If you have any of these symptoms, call your health care provider!    Which symptoms of postpartum depression are dangerous?  Sometimes a woman with postpartum depression will have thoughts of harming herself or her baby. If you find yourself thinking about hurting yourself or your baby, call your health care provider immediately.    MOTHERHOOD: THE EARLY DAYS  You prepare for the birth of your baby for many months during pregnancy, and then the first months at home after your baby is born can be a quiet, gentle time of getting to know this new person who has come to live in your home. But for most women it is not all quiet or sweet. And for some women it is a very hard time.  What Can I Expect in the First Few Months After the Baby Comes?  New mothers and their families face many challenges in the first few months:  Your body and your hormones have to get back to normal.  You and the baby will be learning to breastfeed.  Babies only sleep a  few hours at a time. The entire family will have a hard time getting enough sleep.  You and your family need to learn how to parent this new family member.  If you have a partner, you have to figure out how to stay together as a couple and maybe even start to have sex again.  You may have to figure out how to keep from getting pregnant again right away.  You may need to return to work and find day care.    How Long Will it Take for My Body to Get Back to Normal?  Some changes will occur quickly. Others will not occur as quickly.  Your uterus, cervix, and vagina will all shrink to their nonpregnant size in about 2 weeks. Your vagina may be tender and dry for a few months--especially if you are breastfeeding.  If you had stitches or hemorrhoids, your   bottom   will be sore for 2 weeks or more.  For some women who have problems urinating, it can take several months for you to be able to hold your urine when you cough or sneeze or suddenly  something heavy.  Your breast milk will   come in   2 to 3 days after the birth of your baby. It will take 6 to 8 weeks for you and the baby to get the hang of breastfeeding and find a pattern. During these first weeks, you can have engorged breasts at times and often leak milk.  Your stomach and intestines all have to fall back into place. You may have a lot of gas for a few weeks.  You may be constipated--especially if you are breastfeeding.  Your stretched stomach muscles can recover in a few weeks, but for some women it takes longer--6 months or a year--to recover.  If you had a  delivery, you may have pain or numbness around the incision for 6 months or more.  Losing the weight you gained during pregnancy will probably take 6 months to a year. Have patience! It took 40 weeks to get here. Give yourself 40 weeks to get back.    What Can I Expect When My Hormones Change?  About 75% of all women will get the   blues.   This usually starts about 3 days after the birth  of your baby. You may cry easily and feel very, very tired. A few women become very depressed. If you had a  delivery or your new baby was sick, you are at a higher risk for depression.  Call your health care provider right away if you cannot care for yourself or your baby, if you feel very nervous or worried, if you cannot stop crying, or if you are having thoughts of hurting yourself or your baby.    Taking Care of Yourself  While you are still pregnant:  Talk with your partner and your family about the time ahead. Arrange for someone to help you during the first weeks at home if you can.  Talk with your health care provider about birth control options and make a plan before the baby comes.  If you are worried about how to parent a , take parenting classes. You will learn a lot about how babies act and you will make some friends who are going through the same thing at the same time. Most [facility] have these classes.  Arrange for someone to help with baby care if you can.  After the baby comes:  Ask for help. Let other people do the cooking and cleaning and run the house. Focus on yourself and your baby.  Sleep whenever you can. Try not to be tempted to   get some things done   when the baby sleeps. This is your time to sleep, too.  Drink lots of water. You will need at least 6 big glasses of water everyday to avoid constipation and make enough breast milk. Every time you sit down to breastfeed, have a big glass of water with you to drink while you are nursing.  Eat lots of vegetables and fruit. You will need lots of vitamins and fiber to help your body get back to normal. This will also help you avoid constipation.  Go outside and walk. Babies can go outside even if it is very cold. Fresh air and sunshine will do you both good.  Take sitz baths. Put about 6 inches of warm water in your bathtub and sit in there for 15 minutes 2 to 3 times a day. This will help your   bottom   heal more quickly. It  will also give you 15 minutes of private time!  Talk to other mothers. Join a new parents group. Call Gino and go to [facility] meetings if you are breastfeeding.     With your partner:  Keep talking. Share the experience.  Spend time alone. Even a 30-minute walk can be a date.  Start a birth control method. You can get pregnant before you even have a period. It is very important to use birth control if you do not want to get pregnant again right away.  When you have sex, use a lubricant. A lot of lubricant! Take it slow.  The first few months after a baby comes can be a lot like floating in a jar of honey--very sweet and morrison, but very sticky, too. Take time to enjoy the good parts. Remind yourself that this time will pass. Bon voyage!    FOR MORE INFORMATION  For questions about depression during and after pregnancy:  http://www.womenshealth.gov/publications/our-publications/fact-sheet/depression-pregnancy.html   After birth: The first 6 weeks:  http://www.One Codex/Post-Birth-and-Recovery   Breastfeeding resources:  http://www.OptoNova.org/health-info/getting-breastfeeding-off-to-a-good-start/    Preparing for your baby:       Car Seat Clinics:  https://dps.[place].gov/divisions/ots/child-passenger-safety/Pages/car-seat-checks.aspx    [place] Safety [place]: http://www.[place]ot[place]afetycouncil.org/family/carseatindex.cfm    Child Passenger Safety: Buckle Up Right    When child safety seats and safety belts are used correctly, they can reduce the risk of death by up to 70%. But finding the right combination can be confusing.  How do you know if you should be using an infant-only seat or a convertible seat?  What are booster seats and why do kids need them until they're eight years old or are four feet nine inches tall?  How do you know when a child is ready for your car's safety belt/shoulder strap?  The American Academy of Pediatrics (AAP) recommends that all infants and toddlers should ride  in a Rear-Facing Car Safety Seat until they are two (2) years of age or until they reach the highest weight or height allowed by their car safety seat's .    A child who is both under age 8 and shorter than 4 feet 9 inches is required to be fastened in a child safety seat that meets federal safety standards. Under this law, a child cannot use a seat belt alone until they are age 8, or 4 feet 9 inches tall. It is recommended to keep a child in a booster based on their height rather than their age. Check the instruction book or label of the child safety seat to be sure it is the right seat for your child's weight and height.    www.CarSeatsMadeSimple.org    Car Seat Recycling, -    Get free expert help in a [place] community near you:  [place] Child Passenger Safety Checkup Clinic Calendar    How to Find the Right Car Seat (NHTSA)  Safe Kids [place]    Print Materials  Basic Car Seat Safety checklist  Don't Skip a Step brochure (English); (Maori); (Kenyan)  Good Going! Adventures in Safety magazine  Fact Sheets  Booster Seat Safety  Outdated and Used Child Safety Seats  A Parents' Checklist: Traffic Safety  Driving Your Child to School  Occupant Protection (Safety Belts and Child Safety Seats): Frequently Asked Questions; Misconceptions and Myths; [place] Laws  Air Bags: How Do Air Bags Work; Frequently Asked Questions      Immunizations:  http://www.cdc.gov/vaccines/schedules/easy-to-read/child.html    [place] Screening Program  Http://www.health.[facility].mn.us/newbornscreening/  [place] newborns are tested soon after birth for more than 50 hidden, rare disorders, including hearing loss and critical congenital heart disease (CCHD). This site provides resources and information for families and providers.    Ask your health care provider about vaccinations you may need following delivery. By now, you should have received a Tdap immunization to protect against pertussis or whooping cough.  Fathers and family members who will be in close contact with the baby should also receive a Tdap shot at least two weeks before the expected birth of the baby if they have not had a Td (tetanus) shot for at least two years.    Your midwife may offer to check your cervix for changes. If you are past your due date, discuss the next steps leading to delivery with your midwife. If you don't start labor on your own by 41 or 42 weeks, your midwife may recommend giving you medicines to ripen your cervix and start labor.  Induction of labor: http://onlinelibrary.bergman.com/store/10.1016/j.jmwh.2008.04.018/asset/j.jmwh.2008.04.018.pdf?v=1&t=uygb5kxn&m=[email or web address]    Tell your midwife or physician how you plan to feed your baby (breast or bottle), who you have chosen to do pediatric care for your baby, and if you have a boy, whether you have chosen to have him circumcised. You will need a car seat correctly installed in your vehicle to bring your baby home. As you start to set up the nursery at home for your baby, make sure the crib is safe. The mattress needs to fit snugly against the edges of the crib. If you can fit a soda can between the bars, they are too far apart and can allow the baby's head to caught between them.    Learn about infant care and feeding, including information about infant CPR. We recommend that you put your baby to sleep on his or her back to reduce the chance of Sudden Infant Death Syndrome (SIDS). To maintain a healthy environment in which your child can grow, it's best to keep your home smoke-free. By preparing ahead, your transition into parenthood will go smoothly for you and your baby.    Your midwife will want to see you for a checkup 2 to 6 weeks after delivery.      Making Plans for Feeding My Baby    By this point, you probably have read a lot about feeding your baby.  Breastfeed or formula? Each mother s decision is her own and [facility]wives  [facility] respects you and your choices. We ve gathered information on both breastfeeding and formula feeding to help with your decision. Talking with your physician or nurse-midwife can also help in your decision.  However you plan to feed your baby, [facility] encourage rooming in with your baby, skin-to-skin contact and feeding your baby based on his or her cues.    Skin-to-skin contact  Being close to mom helps your baby adjust to life outside of the womb.  It helps your baby regulate their body temperature, heart rate, and breathing.  Your baby will usually be placed skin-to-skin immediately following birth or as soon as possible, if medical intervention is needed.    Rooming-In  Having your baby stay with you in your room is called  rooming-in .  Keeping your baby in your room helps you to learn how to care for your baby by getting to know your baby s cues, body rhythms and sleep cycle.       Cue-based feeding  Cues (signals) are baby s way of telling you what he or she wants.  When you learn your infant s cues, you know how to care for and feed your baby.   Feeding cues are the licking and smacking of lips, bringing their fist to their mouth, and a reflex called  rooting - where baby turns and opens his or her mouth, searching for the breast or bottle.  Crying is a late feeding cue.  Babies can feed frequently, often at least 8 times in 24 hours.  Breastfeeding facts  Breast milk is the best source of nutrition for your baby and is available at birth. In the first couple of days, your milk volume is already starting to increase, though it may not be noticeable. Breastfeed frequently to increase your milk supply. Within three to five days, you will begin to notice larger milk volumes. An increase in breast size, heaviness and firmness are often described as the milk  coming in.  Frequent breastfeeding can help breasts from getting overly firm and painful. You will know the baby  is getting enough milk if your baby is having wet and dirty diapers and gaining weight.     If your goal is to exclusively breastfeed, it is important to not use any formula or artificial nipples (including bottles and pacifiers) while your baby is learning to breastfeed.  While it may seem like an  easy  option to give your baby a bottle, formula should only be given if there is a medical reason for your baby to have it.    Positioning and attachment   Get comfortable.  Use pillows as needed to support your arms and baby.  Hold baby close at the level of your breast, facing you in a tummy to tummy position.  Skin to skin helps with this.  Position the baby with his or her nose by the nipple.  There should be a straight line from baby s ear to shoulder to hips.  Tickle your baby s lips or wait for baby to open mouth wide, bring baby to breast by leading with the chin.  Aim the nipple at the roof of baby s mouth.  A rapid sucking pattern is followed by longer, drawing pattern with occasional swallows heard.  When baby is correctly latched, your nipple and much of the areola are pulled well into baby s mouth.      Returning to work or school  Focus on a good start to breastfeeding.  Many women continue to provide breastmilk for their baby when they return to work or school.  Making plans about where to pump and store milk can make the transition go well.  Talk with other mothers who have also returned to work or school for tips and support.  Your employer s Human Resource department may be a resource as well.     Returning to work or school: (continued)   babies can mean fewer  sick  days for you.  A quality breast pump will also save time and add comfort.  Check with your insurance prior to giving birth for breast pump coverage.  Many insurance companies include a pump within your benefits.  Wait until your baby is at least three weeks old to introduce a bottle for the first time.  Have someone besides you  give the bottle.  Breastfeed when you are with your baby. Reserve your bottles of breast milk for when you are away.   Your breasts will need to be  emptied  either by your baby or a pump.  Plan to pump at least twice in an eight hour day.  If you cannot pump at work, continue breastfeeding at home. Any amount of breast milk is worth giving to your baby.    Formula feeding facts  If you are planning to use formula to feed your baby, you will want to make some preparations ahead of time. Talk to your doctor or nurse-midwife about what type of formula to use. Some are iron-fortified, meaning they have extra iron in them. You will want to purchase formula and bottles before your baby is born to be sure you are ready after you return from the hospital. The [facility] do not provide formula samples to take home.    Be sure to follow formula mixing directions closely. Regular milk in the dairy case at the grocery store should not be given to babies under 1 year old. Baby formula is sold in several forms including:  Ready-to-use. This is the most expensive, but no mixing is necessary.  Concentrated liquid. This is less expensive than ready-to-use and you mix with water.  Powder. This is the least expensive. You mix one level scoop of powdered formula with two ounces of water and stir well.    Most babies need 2.5 ounces of formula per pound of body weight each day. This means an 8-pound baby may drink about 20 ounces of formula a day; however, this is just an estimate. The most important thing is to pay attention to your baby s cues.  If your baby is always fussy, needs more iron or has certain food allergies, your physician may suggest you change your baby s formula to a different kind.     How do I warm my baby s bottles?  You may feed your baby a bottle without warming it first. It is OK for the breast milk or formula to be cool or room temperature. If your baby seems to prefer it warmed, you can put the  filled bottle in a container of warm water and let it stand for a few minutes. Check the temperature of the liquid on your skin before feeding it to your baby; to be sure it isn t too hot. Do not heat bottles in the microwave. Microwaves heat food and liquids unevenly, and this can cause hot spots that can burn your baby.    How do I clean and sterilize bottles?  Sterilize bottles and nipples before you use them for the first time. You can do this by putting them in boiling water for 5 minutes. After that first time, you can wash them in hot and soapy water. Rinse them carefully to be sure there is no soap left on them. You can also wash them in the .    Childbirth and Parenting Education:       Everyday Miracles:   https://www.everyday-miracles.org/    Free Video Series from [facility]: https://nursing.[facility]/academics/specialty-areas/nurse-midwifery/having-baby-prenatal-videos/having-baby-prenatal-and    Childbirth Education virtual (live) classes: www.TrustID/classes  The Birth Hour: https://Venyo/online-childbirth-class/  BirthED: https://www.birthedmn.com/  FLORINDA parenting center: http://[facility] HospitalHomeCon/   (543) 918-BABY  [place]: (education, yoga & wellness) www.Clink  Enlightened Mama: www.enlightenedmama.Hipvan   Childbirth collective: (Parent topic nights)  www.childbirthcollective.org/  Hypnobabies:  www.hypnobabiestwincities.com/  Hypnobirthing:  Http://hypnobirthing.Hipvan/  Hypnobirthing virtual class: www.Taodangpu/hypnobirthing    Information about doulas:  Childbirth collective: http://www.childbirthcollective.org/  Doulas of [place] (SCOTT):  www.scott.org  [facility]  project: http://Rip van Wafelstiesdoulaproject.com/     Early Childhood and Family Education (ECFE):  ECFE offers parents hands-on learning experiences that will nourish a lifetime of teachable moments.  http://ecfe.info/ecfe-home/    APPS and Podcasts:   Ovia  Glow  "Nurture    Evidence Based Birth  The Birth Hour (for birth stories)   Birthful   Expectful   The Longest Shortest Time  PregnancyPodcast Berenice Cheriseronnie    Book Recommendations:   Latoya Mandy's Birthing From Within--first few chapters include a new-age tone, you may prefer to skip it and keep going, because there is good stuff later.  This book recommendation covers emotional preparation, but does cover coping with pain, and use of both pharmacological and nonpharmacological methods.      Guide to Childbirth by Almita Mcmahan  Childbirth Without Fear by Stacy Lizarraga Read    Dr. Pena' The Pregnancy Book and The Birth Book--the pregnancy book goes month-by month    The Birth Partner by Laura Garcia    Womanly Art of Breastfeeding by La Leche League International   Bestfeeding by Fariba Ramos--great pictures    Mothering Your Nursing Toddler, by Karine Medrano.   Addresses dealing with so many of the challenging behaviors of a nursing toddler.  How Weaning Happens, by La Leche League.  Discusses weaning at all ages, from medically necessary weaning of an infant, all the way up to age 5 (or older), with why/why not, and strategies.  Very empowering book both for deciding to wean and deciding not to.    American College of Nurse-Midwives (ACNM) http://www.midwife.org/; look at the informational handouts at http://www.midwife.org/Share-With-Women     www.mymidwife.org    Mother to Baby (Medication and Herbal guidance in pregnancy): http://www.mothertobaby.org  Toll-Free Hotline: 808.679.5281  LactMed (Medication use while breastfeeding): http://toxnet.nlm.nih.gov/newtoxnet/lactmed.htm    Women's Health.gov:  http://www.womenshealth.gov/a-z-topics/index.html    American pregnancy association - http://americanpregnancy.org    Centering Pregnancy (group prenatal care option): http://centeringhealthcare.org    March of Dimes www.Molcure - Nutrition  www.mypyramid.gov  Under \"For Consumers,\" click on " "\"pregnant and breastfeeding women.\"      Centers for Disease Control and Prevention (CDC) - Vaccines : http://www.cdc.gov/vaccines/       When researching information on the web, question the validity of websites.  The [employer] .gov, .edu and.org tend to be more reliable information.  If there are a lot of advertisements, be cautious of the information provided. Stay away from blogs and chat rooms please!     [facility] Breastfeeding Support    Early Childhood Family [place] [address] provides a free, drop-in class/breastfeeding support group, facilitated by a lactation consultant and .  During the group you can connect with other parents, weigh your baby, ask questions about feeding and baby development, and more.  You do not need to register.  Fall in-person meetings will begin on , are for parents of babies from birth to 9 months, and will meet on Monday evenings from 6 - 7:15 pm in [facility], which is at [address] [place] [address].  [place] [id number] also offers virtual group meetings with a lactation consultant/.  These take place on , from 11:30 am - 12:30 pm for parents of newborns to 3-month-olds, and from 4:15 - 5:15 for parents of 3 - 9 - month olds.  To get the meeting link contact Tamy Curry at [phone].    [facility] offers a free, drop-in breastfeeding support group facilitated by JUANITA Porter.   at [facility] [address], [place].  A scale is available to check baby weights, if desired.  [facility] also has a variety of new parent classes:  (cost for registration)  https://[facility].Circle of Life Odor Resistant Bedding/classes/    [facility] Lactation unge facilitated by JUANITA Hernandez:  Free, drop-in support group for breastfeeding, with baby scale available if needed.  Meets at [facility], second Tuesday of each month, 10 am - 12 pm.  Text [phone] for info.    [facility] Support " "Group, Tuesdays at 10:30 am   Run by JUANITA Bhardwaj of [employer]   Go to [employer] Facebook page, click on \"events\" for link:   Https://www.[employer].com/events/933687598571017/    [facility] breastfeeding support community:  free, drop-in breastfeeding support facilitated by Certified Lactation Counselors, open Mondays and Wednesdays from 1 pm - 5 pm.  In [place]:  [facility] [place], [address] [facility]:   [facility].org    [facility] Milk Hour, Thursdays at 2:30 pm    Run by Danyel Nielsen, JUANITA  Go to [facility] FB page and send message to get link   Https://www.[employer]/Kingfish Labsfoundations/    Aarti Valle:  http://www.Click Contactlondas.org/    [facility] offers a Lactation Lounge every Friday 12pm - 1pm, run by Aarti Cazares Leader.  Sign up via link at [employer]/cbe-lactation   https://www.[employer]/cbe-lactation    [facility] is offering virtual support groups every Monday, 10:30 am - 12 pm, run by RN IBCLC: Https://www.[employer].com/events/124570882131554/    Culturally-Specific Breastfeeding Support:     For Hmong Families:   The Hmong Breastfeeding Coalition is a wonderful support for [place] Hmong women who are breastfeeding.  They are best found on Facebook.    for Black families:    Chocolate Milk Club:  http://www.Waldo NetworksselsmidwiVisionary Pharmaceuticals.[employer]/chocolate-milk-club/  Email: Shannon@Browserling    R.O.S.E. = Reaching our Sisters Everywhere  Http://www.breastfeedingrose.org/    Club Mom breastfeeding support for Black mothers:  Contact Niraj Haley  Phone: [phone]   Email:  [email or web address].     Jacqueline Jarrett  Phone: [phone]   Email:  [email or web address].    Club Dad parent support for Black fathers:   Contact Augie Ambriz   Phone: [phone]   Email:  " "[email or web address].    For Native/Indigenous Families:    https://www.Cooler Planet.com/groups/nitrashad.gimashkikinaan     Additional Resources:  La Leche Game[employer] Networkkhai is an international, nonprofit, nonsectarian organization offering information, education, and support to mothers who want to breast-feed their babies. Local groups offer phone help and monthly meetings. Visit [employer].org or Vertive (Offers.com).org and us the  Find local support  drop down menu or click on the  Resources  tab.    [place] Breastfeeding Resources: 5-249-528-BABY (2229) toll free    National Breastfeeding Help Line trained breastfeeding peer counselors can help answer common breast-feeding questions by phone. Monday-Friday: English/Welsh  [phone]- [phone] toll free, [phone] (TTY)    Virtual Breastfeeding Support:    During this time of isolation, breastfeeding families need even more community!  Here are some area organizations offering virtual support groups for breastfeeding:    [facility] Cafe Support Group, Tuesdays at 10:30 am   Run by JUANITA Bhardwaj of [employer]   Go to [employer] Facebook page and click on \"events\" for link   https://www.Cooler Planet.com/events/261903686853429/  [facility] Milk Hour, Thursdays at 2:30 pm    Run by JUANITA Jones   Go to [facility] FB page and send message to get link   https://www.Cooler Planet.com/healthfoundations/  [facility]/[employer] holding virtual meetings the first Tuesday of each month, 8-9 pm, and the   Third Saturday, 10 - 11 am.  Go to [facility] FB page; message to get link https://www.Cooler Planet.com/Radha/?hc_location=[facility]  [facility] offers a Lactation Lounge every Friday 12pm - 1pm, run by Bobbi CazaresTexas Health Dentonkhai Leader   Sign up via link at https://www.[employer] App/cbe-lactation  [place] " [facility] is offering virtual support groups every Monday, 10:30 am - 12 pm, run by nurse JUANITA   Https://www.facebook.com/events/797586773964631/    Prenatal Breastfeeding Classes:      [facility] is offering virtual breastfeeding and  care classes:  https://www.Engana Pty/education-workshops  [employer] childbirth and breastfeeding education offering virtual prenatal breastfeeding classes  https://www.birthedmn.com/workshops

## 2023-07-28 ENCOUNTER — PRENATAL OFFICE VISIT (OUTPATIENT)
Dept: MIDWIFE SERVICES | Facility: CLINIC | Age: 33
End: 2023-07-28
Payer: COMMERCIAL

## 2023-07-28 VITALS
WEIGHT: 156 LBS | BODY MASS INDEX: 25.18 KG/M2 | SYSTOLIC BLOOD PRESSURE: 104 MMHG | HEART RATE: 78 BPM | DIASTOLIC BLOOD PRESSURE: 64 MMHG

## 2023-07-28 DIAGNOSIS — Z34.80 SUPERVISION OF OTHER NORMAL PREGNANCY, ANTEPARTUM: Primary | ICD-10-CM

## 2023-07-28 DIAGNOSIS — Z87.59 HISTORY OF PRE-ECLAMPSIA: ICD-10-CM

## 2023-07-28 PROCEDURE — 87653 STREP B DNA AMP PROBE: CPT | Performed by: ADVANCED PRACTICE MIDWIFE

## 2023-07-28 PROCEDURE — 99207 PR PRENATAL VISIT: CPT | Performed by: ADVANCED PRACTICE MIDWIFE

## 2023-07-29 LAB — GP B STREP DNA SPEC QL NAA+PROBE: NEGATIVE

## 2023-08-04 ENCOUNTER — PRENATAL OFFICE VISIT (OUTPATIENT)
Dept: MIDWIFE SERVICES | Facility: CLINIC | Age: 33
End: 2023-08-04
Payer: COMMERCIAL

## 2023-08-04 VITALS — DIASTOLIC BLOOD PRESSURE: 60 MMHG | BODY MASS INDEX: 25.5 KG/M2 | SYSTOLIC BLOOD PRESSURE: 108 MMHG | WEIGHT: 158 LBS

## 2023-08-04 DIAGNOSIS — Z87.59 HISTORY OF PRE-ECLAMPSIA: ICD-10-CM

## 2023-08-04 DIAGNOSIS — Z34.80 SUPERVISION OF OTHER NORMAL PREGNANCY, ANTEPARTUM: Primary | ICD-10-CM

## 2023-08-04 DIAGNOSIS — O26.843 UTERINE SIZE-DATE DISCREPANCY IN THIRD TRIMESTER: ICD-10-CM

## 2023-08-04 LAB — HGB BLD-MCNC: 12.9 G/DL (ref 11.7–15.7)

## 2023-08-04 PROCEDURE — 36415 COLL VENOUS BLD VENIPUNCTURE: CPT | Performed by: ADVANCED PRACTICE MIDWIFE

## 2023-08-04 PROCEDURE — 99207 PR PRENATAL VISIT: CPT | Performed by: ADVANCED PRACTICE MIDWIFE

## 2023-08-04 NOTE — PROGRESS NOTES
Cayla Lerner is a 33 year old  at 37w1d, presenting today alone for routine OB care.  Concerns: first baby had a big cranial hematoma that appeared 3 days postpartum; wondering if she can do anything to prevent. Resolved after with gentle massage. Reassured that this is common with vaginal birth and cannot be prevented. Controlled pushing may be helpful.     Total weight gain 38#; interval gain 2# with normal fundal height.   SVE offered and declined today.   No vaginal bleeding, LOF, contractions.  No HA, RUQ pain, N/V, visual changes.  Labor precautions discussed.  Hgb collected today as was not done last week due to time constraints.  RTC 1 week.    NELDA Klein CNM

## 2023-08-04 NOTE — PATIENT INSTRUCTIONS
"\"We hope you had a positive experience and that you can definitely recommend [facility] to your family and friends. You ll be receiving a survey soon and we look forward to hearing your feedback\".    [facility] - Contact information:  Appointment line and to get a hold of CNM in clinic Monday-Friday 8 am - 5 pm:  [phone].  There are some clinics with early start times (1st appointment 7:40 am) and others with evening hours (last appointment 6:20 pm).  Most are typically open from 8 am to 5 pm.    CNM on call answering service: [phone].  Specify your hospital of choice and leave a brief message for CNM;  will then page CNM who is on call at your specified hospital and you should receive a call back with 15 minutes.  Be sure that your ringer is audible and that you can accept blocked calls so that we can get back in touch with you! This number should be reserved for urgent needs if during the day, before 8 am, after 5 pm, weekends, holidays.    Contact the on-call CNM with warning signs, such as:  vaginal bleeding   Vaginal discharge and itching or pain and burning during urination  Leg/calf pain or swelling on one side  severe abdominal pain  nausea and vomiting more than 4-5 times a day, or if you are unable to keep anything down  fever more than 100.4 degrees F.     Rkylinhart  After each of your visits you are welcome to check Fuzz for your visit summary including education and links to information relevant to your pregnancy and/or well woman care.   Find the \"Visits\" tab at the top of the page, you will see a list of recent visits and for each visit a for link for \"View After Visit Summary.\" View of your After Visit Summary will allow you to read our recommendations from your visit, review any education provided, and link to websites with useful information.   If you have any questions or difficulty navigating Interactive Fate, please feel free to " "contact us and we will do our best to direct you.    Meet the Midwives from [facility]  You are invited to an informational meet and greet with [facility] Certified Nurse-Midwives. Our free \"Meet the Midwives\" event is a great opportunity to learn about our midwives' philosophy and experience, the hospitals where we can assist with your birth, and answer questions you may have. Partners, friends, and family are welcome to attend. Currently, this is a virtual event.  Dates: Last Thursday of every month at 7 pm.    Link to next (live) meeting  [email or web address]  To Join by Telephone (audio only) Call:   [phone] Phone Conference ID: [phone] #      Touring the Maternity Care Center  At this time we are offering a virtual tour of the Maternity Care Centers at both [facility] and [facility]:   [facility] Maternity Care:   [email or web address]:   [email or web address]://[facility].[email or web address]/locations/[facility]    Scroll to the bottom of this hyperlink if the above link does not work      Childbirth and Parenting Education:     Everyday Miracles:   https://www.everyday-miracles.org/    Free Video Series from [facility]: https://nursing.[facility]/academics/specialty-areas/nurse-midwifery/having-baby-prenatal-videos/having-baby-prenatal-and    Childbirth Education virtual (live) classes: www.Mambu/classes  The Birth Hour: https://AskNshare/online-childbirth-class/  BirthED: https://www.birthedmn.com/  FLORINDA parenting center: http://ammapareCareem.DriverSaveClub.com/   ([phone] 599-BABY  Blooma: (education, yoga & wellness) www.EnhanCV.DriverSaveClub.com  Enlightened Mama: www.enlightenedmama.com   Childbirth collective: (Parent topic nights)  www.childbirthcollective.org/  Hypnobabies:  " www.hypnOptimal Internet Solutionsbiestefw-suhlcities.com/  Hypnobirthing:  Http://hypnFox Technologies.Latinda/  Hypnobirthing virtual class: www.InsightpoolttOrbis Educationbirth.Latinda/hypnobirthing    Information about doulas:  Childbirth collective: http://www.childbirthcollective.org/  Doulas of [place] (SCOTT):  www.scott.org  OOYYO  project: http://VirtualmindoTransBiodieselject.Latinda/     Early Childhood and Family Education (ECFE):  ECFE offers parents hands-on learning experiences that will nourish a lifetime of teachable moments.  http://ecfe.info/ecfe-home/    APPS and Podcasts:   Mary Clarke Nurture    Evidence Based Birth  The Birth Hour (for birth stories)   Birthful   Expectful   The Longest Shortest Time  PregnancyPodcast Berenice García    Book Recommendations:   Latoya Pulaski's Birthing From Within--first few chapters include a new-age tone, you may prefer to skip it and keep going, because there is good stuff later.  This book recommendation covers emotional preparation, but does cover coping with pain, and use of both pharmacological and nonpharmacological methods.    Guide to Childbirth by Almita Mcmahan  Childbirth Without Fear by Stacy Lizarraga Read    Dr. Pena' The Pregnancy Book and The Birth Book--the pregnancy book goes month-by month    The Birth Partner by Laura Garcia    Womanly Art of Breastfeeding by La Leche League International   Bestfeeding by Fariba Ramos--great pictures    Mothering Your Nursing Toddler, by Karine Medrano.   Addresses dealing with so many of the challenging behaviors of a nursing toddler.  How Weaning Happens, by La Leche League.  Discusses weaning at all ages, from medically necessary weaning of an infant, all the way up to age 5 (or older), with why/why not, and strategies.  Very empowering book both for deciding to wean and deciding not to.    American College of Nurse-Midwives (ACNM) http://www.midwife.org/; look at the informational handouts at http://www.midwife.org/Share-With-Women    "  www.mymidwife.org    Mother to Baby (Medication and Herbal guidance in pregnancy): http://www.mothertobaby.org  Toll-Free Hotline: 175.339.7021  LactMed (Medication use while breastfeeding): http://toxnet.nlm.nih.gov/newtoxnet/lactmed.htm    Women's Health.gov:  http://www.womenshealth.gov/a-z-topics/index.html    American pregnancy association - http://americanpregnancy.org    Centering Pregnancy (group prenatal care option): http://centeringhealthcare.org    March of Dimes www.Visualead.Active Tax & Accounting     FDA - Nutrition  www.mypyramid.gov  Under \"For Consumers,\" click on \"pregnant and breastfeeding women.\"      Centers for Disease Control and Prevention (CDC) - Vaccines : http://www.cdc.gov/vaccines/       When researching information on the web, question the validity of websites.  The [place] .Parent Media Group, Behavio and.org tend to be more reliable information.  If there are a lot of advertisements, be cautious of the information provided. Stay away from blogs and chat rooms please!     Making Plans for Feeding My Baby    By this point, you probably have read a lot about feeding your baby.  Breastfeed or formula? Each parent's decision is their own and [facility] Nurse [facility] respects you and your choices. We ve gathered information on both breastfeeding and formula feeding to help with your decision. Talking with your nurse-midwife can also help in your decision.  However you plan to feed your baby, [facility] encourage rooming in with your baby, skin-to-skin contact and feeding your baby based on his or her cues.    Skin-to-skin contact  Being close to mom helps your baby adjust to life outside of the womb.  It helps your baby regulate their body temperature, heart rate, and breathing.  Your baby will usually be placed skin-to-skin immediately following birth or as soon as possible, if medical intervention is needed.    Rooming-In  Having your baby stay with you in your room is " called  rooming-in .  Keeping your baby in your room helps you to learn how to care for your baby by getting to know your baby s cues, body rhythms and sleep cycle.       Cue-based feeding  Cues (signals) are baby s way of telling you what he or she wants.  When you learn your infant s cues, you know how to care for and feed your baby.   Feeding cues are the licking and smacking of lips, bringing their fist to their mouth, and a reflex called  rooting - where baby turns and opens his or her mouth, searching for the breast or bottle.  Crying is a late feeding cue.  Babies can feed frequently, often at least 8 times in 24 hours.    Breastfeeding facts  Breast milk is the best source of nutrition for your baby and is available at birth. In the first couple of days, your milk volume is already starting to increase, though it may not be noticeable. Breastfeed frequently to increase your milk supply. Within three to five days, you will begin to notice larger milk volumes. An increase in breast size, heaviness and firmness are often described as the milk  coming in.  Frequent breastfeeding can help breasts from getting overly firm and painful. You will know the baby is getting enough milk if your baby is having wet and dirty diapers and gaining weight.     If your goal is to exclusively breastfeed, it is important to not use any formula or artificial nipples (including bottles and pacifiers) while your baby is learning to breastfeed.  While it may seem like an  easy  option to give your baby a bottle, formula should only be given if there is a medical reason for your baby to have it.      Positioning and attachment   Get comfortable.  Use pillows as needed to support your arms and baby.  Hold baby close at the level of your breast, facing you in a tummy to tummy position.  Skin to skin helps with this.  Position the baby with his or her nose by the nipple.  There should be a straight line from baby s ear to shoulder to  hips.  Tickle your baby s lips or wait for baby to open mouth wide, bring baby to breast by leading with the chin.  Aim the nipple at the roof of baby s mouth.  A rapid sucking pattern is followed by longer, drawing pattern with occasional swallows heard.  When baby is correctly latched, your nipple and much of the areola are pulled well into baby s mouth.      Returning to work or school  Focus on a good start to breastfeeding.  Many women continue to provide breastmilk for their baby when they return to work or school.  Making plans about where to pump and store milk can make the transition go well.  Talk with other mothers who have also returned to work or school for tips and support.  Your employer s Human Resource department may be a resource as well.     Returning to work or school: (continued)   babies can mean fewer  sick  days for you.  A quality breast pump will also save time and add comfort.  Check with your insurance prior to giving birth for breast pump coverage.  Many insurance companies include a pump within your benefits.  Wait until your baby is at least three weeks old to introduce a bottle for the first time.  Have someone besides you give the bottle.  Breastfeed when you are with your baby. Reserve your bottles of breast milk for when you are away.   Your breasts will need to be  emptied  either by your baby or a pump.  Plan to pump at least twice in an eight hour day.  If you cannot pump at work, continue breastfeeding at home. Any amount of breast milk is worth giving to your baby.    Formula feeding facts  If you are planning to use formula to feed your baby, you will want to make some preparations ahead of time. Talk to your doctor or nurse-midwife about what type of formula to use. Some are iron-fortified, meaning they have extra iron in them. You will want to purchase formula and bottles before your baby is born to be sure you are ready after you return from the hospital. The  [facility] hospitals do not provide formula samples to take home.    Be sure to follow formula mixing directions closely. Regular milk in the dairy case at the grocery store should not be given to babies under 1 year old. Baby formula is sold in several forms including:  Ready-to-use. This is the most expensive, but no mixing is necessary.  Concentrated liquid. This is less expensive than ready-to-use and you mix with water.  Powder. This is the least expensive. You mix one level scoop of powdered formula with two ounces of water and stir well.    Most babies need 2.5 ounces of formula per pound of body weight each day. This means an 8-pound baby may drink about 20 ounces of formula a day; however, this is just an estimate. The most important thing is to pay attention to your baby s cues.  If your baby is always fussy, needs more iron or has certain food allergies, your physician may suggest you change your baby s formula to a different kind.     How do I warm my baby s bottles?  You may feed your baby a bottle without warming it first. It is OK for the breast milk or formula to be cool or room temperature. If your baby seems to prefer it warmed, you can put the filled bottle in a container of warm water and let it stand for a few minutes. Check the temperature of the liquid on your skin before feeding it to your baby; to be sure it isn t too hot. Do not heat bottles in the microwave. Microwaves heat food and liquids unevenly, and this can cause hot spots that can burn your baby.    How do I clean and sterilize bottles?  Sterilize bottles and nipples before you use them for the first time. You can do this by putting them in boiling water for 5 minutes. After that first time, you can wash them in hot and soapy water. Rinse them carefully to be sure there is no soap left on them. You can also wash them in the .    Breastfeeding/Chestfeeding Support  Contact us  Breastfeeding/chestfeeding is the natural and  healthy way for parents to feed their babies and provides the best source of nutrition in most cases to infants. Breast milk has health benefits for mom, too. The American Academy of Pediatrics recommends exclusively breastfeeding for 6 months for optimal growth and development and breastfeeding up to at least a year.  Benefits to baby:  Easy digestion, less diarrhea and constipation, breast milk is easy to digest.  Lots of bonding with parent.  Less likely to have asthma.  Less ear infections and respiratory infections.  Less likely to have Type 2 Diabetes.  Less likely to become obese.  Lower risk of Sudden Infant Death Syndrome (SIDS).  Benefits to breastfeeding/chestfeeding parent:  Helps with weight loss.  Lower risk of ovarian and breast cancer, Type 2 diabetes and heart disease.  /chestfed babies are easy to take on trips. Just grab the diapers and go!  Breastfeeding/chestfeeding saves money (no formula or bottle costs, fewer doctor bills and medication costs).  Ready to breastfeed/chestfeed anywhere, don t need to make and wash bottles.  Breastfeeding/chestfeeding is wonderful, but not always easy. Learning what to expect ahead of time and get support from a lactation professional. Check out the [facility] Breastfeeding Resource Guide for classes, drop in support groups, childbirth education, Doulas and clinic and hospital lactation services.     [place] [facility] [place] [address] provides a free, drop-in class/breastfeeding support group, facilitated by a lactation consultant and .  During the group you can connect with other parents, weigh your baby, ask questions about feeding and baby development, and more.  You do not need to register.  Fall in-person meetings will begin on September 12, are for parents of babies from birth to 9 months, and will meet on Monday evenings from 6 - 7:15 pm in [facility], which is at [address] [facility].  [facility]  "[place] [address] also offers virtual group meetings with a lactation consultant/.  These take place on , from 11:30 am - 12:30 pm for parents of newborns to 3-month-olds, and from 4:15 - 5:15 for parents of 3 - 9 - month olds.  To get the meeting link contact Tamy Curry at [phone].    [facility] offers a free, drop-in breastfeeding support group facilitated by JUANITA Porter.   at [place] [facility] [address], [place].  A scale is available to check baby weights, if desired.  [place] also has a variety of new parent classes:  (cost for registration)  https://Affaredelgiorno/classes/    [facility] Lactation Lounge facilitated by JUANITA Hernandez:  Free, drop-in support group for breastfeeding, with baby scale available if needed.  Meets at [facility], second Tuesday of each month, 10 am - 12 pm.  Text [phone] for info.    [place] [facility] Support Group,  at 10:30 am   Run by JUANITA Bhardwaj of The Baby Whisperer Lactation Consultants   Go to The Baby Whisperer Lactation Consultants Facebook page, click on \"events\" for link:   Https://www.MetaModix.com/events/[email or web address]/    [facility] [place] breastfeeding support community:  free, drop-in breastfeeding support facilitated by Certified Lactation Counselors, open  and  from 1 pm - 5 pm.  In [place]:  [facility], [address] [facility]:   guidingBath Community Hospitalko.[facility] Milk Hour,  at 2:30 pm    Run by JUANITA Jones  Go to [facility] FB page and send message to get link   Https://www.MetaModix.com/healthfoundations/    Aarti Valle:  http://[email or web address]    [facility] offers a Lactation Lounge every Friday 12pm - 1pm, run by aArti Cazares Leader.  Sign up via link at blooma.com/cbe-lactation   https://www.Mobile Bridge/cbe-lactation    [place] Birth " [place] is offering virtual support groups every Monday, 10:30 am - 12 pm, run by RN IBCLC: Https://www.facebook.com/events/565403045154548/    Culturally-Specific Breastfeeding Support:     For Hmong Families:   The Hmong Breastfeeding Coalition is a wonderful support for [place] Hmong women who are breastfeeding.  They are best found on Facebook.    for Black families:    hc1.comcolate Milk Club:  http://www.EoPlex Technologies/chocolate-milk-club/  Email: Shannon@AccessSportsMedia.com    R.O.S.E. = Reaching our Sisters Everywhere  Http://www.breastfeedingrose.org/    Club Mom breastfeeding support for Black mothers:  Contact Niraj Haley  Phone: [phone]   Email:  [email or web address].     Jacqueline Jarrett  Phone: [phone]   Email:  [email or web address] State Hospital.    Club Dad parent support for Black fathers:   Contact Augie Ambriz   Phone: [phone]   Email:  [email or web address]    For Native/Indigenous Families:    https://www.RaftOut.com/groups/nitamising.gimashkikinaan     Additional Resources:  La Leche League is an international, nonprofit, nonsectarian organization offering information, education, and support to mothers who want to breast-feed their babies. Local groups offer phone help and monthly meetings. Visit Fab'entech.org or Tellagence.org and us the  Find local support  drop down menu or click on the  Resources  tab.    [place] Breastfeeding Resources: 3-756-634-BABY (2229) toll free    National Breastfeeding Help Line trained breastfeeding peer counselors can help answer common breast-feeding questions by phone. Monday-Friday: English/Indonesian  0-986- [phone] toll free, 1-462.669.8994 (TTY)    Virtual Breastfeeding Support:    During this time of isolation, breastfeeding families need even more community!  Here are some area organizations offering virtual support groups for breastfeeding:    [facility] Support Group, Tuesdays at 10:30 am   Run by Meme  "JUANITA Chavez of The Baby Whisperer Lactation Consultants   Go to The Baby Whisperer Lactation Consultants Facebook page and click on \"events\" for link   https://www.facebook.com/events/371028395638518/  [facility] Milk Hour,  at 2:30 pm    Run by JUANITA Jones   Go to [facility] FB page and send message to get link   https://www.Canadian Digital Media Network.Odnoklassniki/healthfoundations/  [facility] holding virtual meetings the first Tuesday of each month, 8-9 pm, and the   Third Saturday, 10 - 11 am.  Go to [facility] [place] FB page; message to get link https://www.Ayehu Software Technologies/LLLofGoldenEdwin/?hc_location=[facility]  [place] offers a Lactation Lounge every Friday 12pm - 1pm, run by Negin CazaresPending sale to [facility] Leader   Sign up via link at https://www.VaxInnate/cbe-lactation  [facility] is offering virtual support groups every Monday, 10:30 am - 12 pm, run by nurse IBCLC   Https://www.facebook.com/events/864750281005227/    Prenatal Breastfeeding Classes:      [facility] is offering virtual breastfeeding and  care classes:  https://www.VaxInnate/education-workshops  BirthEd childbirth and breastfeeding education offering virtual prenatal breastfeeding classes  https://www.birthedmn.com/workshops    Preparing for your baby:     [place] Screening Program  Http://www.health.[facility].mn.us/newbornscreening/  [place] newborns are tested soon after birth for more than 50 hidden, rare disorders, including hearing loss and critical congenital heart disease (CCHD). This site provides resources and information for families and providers.    When to call:   Appointment line and to get a hold of CNM in clinic Monday-Friday 8 am - 5 pm:  [phone].  There are some clinics with early start times (1st appointment 7:40 am) and others with evening hours (last appointment 6:20 pm).  Most are typically " open from 8 am to 5 pm.    CNM on call answering service: [phone].  Specify your hospital of choice and leave a brief message for CNM;  will then page CNM who is on call at your specified hospital and you should receive a call back with 15 minutes.  Be sure that your ringer is audible and that you can accept blocked calls so that we can get back in touch with you! This number should be reserved for urgent needs if during the day, before 8 am, after 5 pm, weekends, holidays.    Contact the on-call CNM with warning signs, such as:  vaginal bleeding   Vaginal discharge and itching or pain and burning during urination  Leg/calf pain or swelling on one side  severe abdominal pain  nausea and vomiting more than 4-5 times a day, or if you are unable to keep anything down  fever more than 100.4 degrees F.     Make plans for transportation and  as needed for when you are going to the hospital.    Ask your health care provider about vaccinations you may need following delivery. By now, you should have received a Tdap immunization to protect against pertussis or whooping cough. Fathers and family members who will be in close contact with the baby should also receive a Tdap shot at least two weeks before the expected birth of the baby if they have not had a Td (tetanus) shot for at least two years.    Tell your midwife or physician how you plan to feed your baby (breast or bottle), who you have chosen to do pediatric care for your baby, and if you have a boy, whether you have chosen to have him circumcised. You will need a car seat correctly installed in your vehicle to bring your baby home. As you start to set up the nursery at home for your baby, make sure the crib is safe. The mattress needs to fit snugly against the edges of the crib. If you can fit a soda can between the bars, they are too far apart and can allow the baby's head to caught between them.    Learn about infant care and feeding,  including information about infant CPR. We recommend that you put your baby to sleep on his or her back to reduce the chance of Sudden Infant Death Syndrome (SIDS). To maintain a healthy environment in which your child can grow, it's best to keep your home smoke-free. By preparing ahead, your transition into [email or web address] will [id number] you and [id number] [email or web address]    Your midwife will want to see you for a checkup 2 to 6 weeks after [email or web address]

## 2023-08-08 ENCOUNTER — HOSPITAL ENCOUNTER (OUTPATIENT)
Dept: ULTRASOUND IMAGING | Facility: HOSPITAL | Age: 33
Discharge: HOME | End: 2023-08-08
Attending: ADVANCED PRACTICE MIDWIFE | Admitting: ADVANCED PRACTICE MIDWIFE
Payer: COMMERCIAL

## 2023-08-08 DIAGNOSIS — O26.843 UTERINE SIZE-DATE DISCREPANCY IN THIRD TRIMESTER: ICD-10-CM

## 2023-08-08 PROCEDURE — 76816 OB US FOLLOW-UP PER FETUS: CPT

## 2023-08-09 ENCOUNTER — DOCUMENTATION ONLY (OUTPATIENT)
Dept: MIDWIFE SERVICES | Facility: CLINIC | Age: 33
End: 2023-08-09

## 2023-08-09 ENCOUNTER — PRENATAL OFFICE VISIT (OUTPATIENT)
Dept: MIDWIFE SERVICES | Facility: CLINIC | Age: 33
End: 2023-08-09
Payer: COMMERCIAL

## 2023-08-09 VITALS
WEIGHT: 161 LBS | HEART RATE: 80 BPM | DIASTOLIC BLOOD PRESSURE: 66 MMHG | SYSTOLIC BLOOD PRESSURE: 110 MMHG | BODY MASS INDEX: 25.99 KG/M2

## 2023-08-09 DIAGNOSIS — Z34.80 SUPERVISION OF OTHER NORMAL PREGNANCY, ANTEPARTUM: Primary | ICD-10-CM

## 2023-08-09 PROCEDURE — 99207 PR PRENATAL VISIT: CPT | Performed by: MIDWIFE

## 2023-08-09 NOTE — PATIENT INSTRUCTIONS
"\"We hope you had a positive experience and that you can definitely recommend [facility] to your family and friends. You ll be receiving a survey soon and we look forward to hearing your feedback\".    [facility] - Contact information:  Appointment line and to get a hold of CNM in clinic Monday-Friday 8 am - 5 pm:  [phone].  There are some clinics with early start times (1st appointment 7:40 am) and others with evening hours (last appointment 6:20 pm).  Most are typically open from 8 am to 5 pm.    CNM on call answering service: [phone].  Specify your hospital of choice and leave a brief message for CNM;  will then page CNM who is on call at your specified hospital and you should receive a call back with 15 minutes.  Be sure that your ringer is audible and that you can accept blocked calls so that we can get back in touch with you! This number should be reserved for urgent needs if during the day, before 8 am, after 5 pm, weekends, holidays.    Contact the on-call CNM with warning signs, such as:  vaginal bleeding   Vaginal discharge and itching or pain and burning during urination  Leg/calf pain or swelling on one side  severe abdominal pain  nausea and vomiting more than 4-5 times a day, or if you are unable to keep anything down  fever more than 100.4 degrees F.     EZprints.comhart  After each of your visits you are welcome to check ProPerforma for your visit summary including education and links to information relevant to your pregnancy and/or well woman care.   Find the \"Visits\" tab at the top of the page, you will see a list of recent visits and for each visit a for link for \"View After Visit Summary.\" View of your After Visit Summary will allow you to read our recommendations from your visit, review any education provided, and link to websites with useful information.   If you have any questions or difficulty navigating Abloomy, please feel free to " "contact us and we will do our best to direct you.    Meet the Midwives from [facility]  You are invited to an informational meet and greet with [facility] Certified Nurse-Midwives. Our free \"Meet the Midwives\" event is a great opportunity to learn about our midwives' philosophy and experience, the hospitals where we can assist with your birth, and answer questions you may have. Partners, friends, and family are welcome to attend. Currently, this is a virtual event.  Dates: Last Thursday of every month at 7 pm.    Link to next (live) meeting  [email or web address]-the-[email or web address]  To Join by Telephone (audio only) Call:   [phone] Phone Conference ID: [phone] #      Touring the Maternity Care Center  At this time we are offering a virtual tour of the Maternity Care Centers at both [facility] and [facility]:   [facility] Maternity Care:   https://[facility].org/locations/the-[facility]place-[facility]:   https://[facility].org/locations/the-[facility]    Scroll to the bottom of this hyperlink if the above link does not work      Childbirth and Parenting Education:     Everyday Miracles:   https://www.everyday-miracles.org/    Free Video Series from [facility]: https://nursing.[facility]/academics/specialty-areas/nurse-midwifery/having-baby-prenatal-videos/having-baby-prenatal-and    Childbirth Education virtual (live) classes: www.Digitrad Communications/classes  The Birth Hour: https://Open Garden/online-childbirth-class/  BirthED: https://www.birthedmn.com/  FLORINDA parenting center: http://ammapareARCA biopharma.HYGIEIA/   ([phone]-BABY  Blooma: (education, yoga & wellness) www."Scrypt, Inc".HYGIEIA  Enlightened Mama: www.enlightenedmama.com   Childbirth collective: (Parent topic nights)  www.childbirthcollective.org/  Hypnobabies:  " www.hypnCodigamesbiestHaltoncities.com/  Hypnobirthing:  Http://hypnObjectworld Communications.NOTIK/  Hypnobirthing virtual class: www.SweetIQ AnalyticsttAriistobirth.NOTIK/hypnobirthing    Information about doulas:  Childbirth collective: http://www.childbirthcollective.org/  Doulas of [place] (SCOTT):  www.scott.org  B-hive Networks  project: http://Lenco MobiledoTaiwan Yuandong Groupject.NOTIK/     Early Childhood and Family Education (ECFE):  ECFE offers parents hands-on learning experiences that will nourish a lifetime of teachable moments.  http://ecfe.info/ecfe-home/    APPS and Podcasts:   Mary Clarke Nurture    Evidence Based Birth  The Birth Hour (for birth stories)   Birthful   Expectful   The Longest Shortest Time  PregnancyPodcast Berenice García    Book Recommendations:   Latoya Lansing's Birthing From Within--first few chapters include a new-age tone, you may prefer to skip it and keep going, because there is good stuff later.  This book recommendation covers emotional preparation, but does cover coping with pain, and use of both pharmacological and nonpharmacological methods.    Guide to Childbirth by Almita Mcmahan  Childbirth Without Fear by Stacy Lizarraga Read    Dr. Pena' The Pregnancy Book and The Birth Book--the pregnancy book goes month-by month    The Birth Partner by Laura Garcia    Womanly Art of Breastfeeding by La Leche League International   Bestfeeding by Fariba Ramos--great pictures    Mothering Your Nursing Toddler, by Karine Medrano.   Addresses dealing with so many of the challenging behaviors of a nursing toddler.  How Weaning Happens, by La Leche League.  Discusses weaning at all ages, from medically necessary weaning of an infant, all the way up to age 5 (or older), with why/why not, and strategies.  Very empowering book both for deciding to wean and deciding not to.    American College of Nurse-Midwives (ACNM) http://www.midwife.org/; look at the informational handouts at http://www.midwife.org/Share-With-Women    "  www.mymidwife.org    Mother to Baby (Medication and Herbal guidance in pregnancy): http://www.mothertobaby.org  Toll-Free Hotline: 818.546.6603  LactMed (Medication use while breastfeeding): http://toxnet.nlm.nih.gov/newtoxnet/lactmed.htm    Women's Health.gov:  http://www.womenshealth.gov/a-z-topics/index.html    American pregnancy association - http://americanpregnancy.org    Centering Pregnancy (group prenatal care option): http://centeringhealthcare.org    March of Dimes www.Luminoso.TravelSite.com     FDA - Nutrition  www.mypyramid.gov  Under \"For Consumers,\" click on \"pregnant and breastfeeding women.\"      Centers for Disease Control and Prevention (CDC) - Vaccines : http://www.cdc.gov/vaccines/       When researching information on the web, question the validity of websites.  The Deep Imaging Technologies .Green Earth Technologies, Neptune Mobile Devices and.org tend to be more reliable information.  If there are a lot of advertisements, be cautious of the information provided. Stay away from blogs and chat rooms please!     Making Plans for Feeding My Baby    By this point, you probably have read a lot about feeding your baby.  Breastfeed or formula? Each parent's decision is their own and [facility] respects you and your choices. We ve gathered information on both breastfeeding and formula feeding to help with your decision. Talking with your nurse-midwife can also help in your decision.  However you plan to feed your baby, [facility] encourage rooming in with your baby, skin-to-skin contact and feeding your baby based on his or her cues.    Skin-to-skin contact  Being close to mom helps your baby adjust to life outside of the womb.  It helps your baby regulate their body temperature, heart rate, and breathing.  Your baby will usually be placed skin-to-skin immediately following birth or as soon as possible, if medical intervention is needed.    Rooming-In  Having your baby stay with you in your room is " called  rooming-in .  Keeping your baby in your room helps you to learn how to care for your baby by getting to know your baby s cues, body rhythms and sleep cycle.       Cue-based feeding  Cues (signals) are baby s way of telling you what he or she wants.  When you learn your infant s cues, you know how to care for and feed your baby.   Feeding cues are the licking and smacking of lips, bringing their fist to their mouth, and a reflex called  rooting - where baby turns and opens his or her mouth, searching for the breast or bottle.  Crying is a late feeding cue.  Babies can feed frequently, often at least 8 times in 24 hours.    Breastfeeding facts  Breast milk is the best source of nutrition for your baby and is available at birth. In the first couple of days, your milk volume is already starting to increase, though it may not be noticeable. Breastfeed frequently to increase your milk supply. Within three to five days, you will begin to notice larger milk volumes. An increase in breast size, heaviness and firmness are often described as the milk  coming in.  Frequent breastfeeding can help breasts from getting overly firm and painful. You will know the baby is getting enough milk if your baby is having wet and dirty diapers and gaining weight.     If your goal is to exclusively breastfeed, it is important to not use any formula or artificial nipples (including bottles and pacifiers) while your baby is learning to breastfeed.  While it may seem like an  easy  option to give your baby a bottle, formula should only be given if there is a medical reason for your baby to have it.      Positioning and attachment   Get comfortable.  Use pillows as needed to support your arms and baby.  Hold baby close at the level of your breast, facing you in a tummy to tummy position.  Skin to skin helps with this.  Position the baby with his or her nose by the nipple.  There should be a straight line from baby s ear to shoulder to  hips.  Tickle your baby s lips or wait for baby to open mouth wide, bring baby to breast by leading with the chin.  Aim the nipple at the roof of baby s mouth.  A rapid sucking pattern is followed by longer, drawing pattern with occasional swallows heard.  When baby is correctly latched, your nipple and much of the areola are pulled well into baby s mouth.      Returning to work or school  Focus on a good start to breastfeeding.  Many women continue to provide breastmilk for their baby when they return to work or school.  Making plans about where to pump and store milk can make the transition go well.  Talk with other mothers who have also returned to work or school for tips and support.  Your employer s Human Resource department may be a resource as well.     Returning to work or school: (continued)   babies can mean fewer  sick  days for you.  A quality breast pump will also save time and add comfort.  Check with your insurance prior to giving birth for breast pump coverage.  Many insurance companies include a pump within your benefits.  Wait until your baby is at least three weeks old to introduce a bottle for the first time.  Have someone besides you give the bottle.  Breastfeed when you are with your baby. Reserve your bottles of breast milk for when you are away.   Your breasts will need to be  emptied  either by your baby or a pump.  Plan to pump at least twice in an eight hour day.  If you cannot pump at work, continue breastfeeding at home. Any amount of breast milk is worth giving to your baby.    Formula feeding facts  If you are planning to use formula to feed your baby, you will want to make some preparations ahead of time. Talk to your doctor or nurse-midwife about what type of formula to use. Some are iron-fortified, meaning they have extra iron in them. You will want to purchase formula and bottles before your baby is born to be sure you are ready after you return from the hospital. The  [facility] hospitals do not provide formula samples to take home.    Be sure to follow formula mixing directions closely. Regular milk in the dairy case at the grocery store should not be given to babies under 1 year old. Baby formula is sold in several forms including:  Ready-to-use. This is the most expensive, but no mixing is necessary.  Concentrated liquid. This is less expensive than ready-to-use and you mix with water.  Powder. This is the least expensive. You mix one level scoop of powdered formula with two ounces of water and stir well.    Most babies need 2.5 ounces of formula per pound of body weight each day. This means an 8-pound baby may drink about 20 ounces of formula a day; however, this is just an estimate. The most important thing is to pay attention to your baby s cues.  If your baby is always fussy, needs more iron or has certain food allergies, your physician may suggest you change your baby s formula to a different kind.     How do I warm my baby s bottles?  You may feed your baby a bottle without warming it first. It is OK for the breast milk or formula to be cool or room temperature. If your baby seems to prefer it warmed, you can put the filled bottle in a container of warm water and let it stand for a few minutes. Check the temperature of the liquid on your skin before feeding it to your baby; to be sure it isn t too hot. Do not heat bottles in the microwave. Microwaves heat food and liquids unevenly, and this can cause hot spots that can burn your baby.    How do I clean and sterilize bottles?  Sterilize bottles and nipples before you use them for the first time. You can do this by putting them in boiling water for 5 minutes. After that first time, you can wash them in hot and soapy water. Rinse them carefully to be sure there is no soap left on them. You can also wash them in the .    Breastfeeding/Chestfeeding Support  Contact us  Breastfeeding/chestfeeding is the natural and  healthy way for parents to feed their babies and provides the best source of nutrition in most cases to infants. Breast milk has health benefits for mom, too. The American Academy of Pediatrics recommends exclusively breastfeeding for 6 months for optimal growth and development and breastfeeding up to at least a year.  Benefits to baby:  Easy digestion, less diarrhea and constipation, breast milk is easy to digest.  Lots of bonding with parent.  Less likely to have asthma.  Less ear infections and respiratory infections.  Less likely to have Type 2 Diabetes.  Less likely to become obese.  Lower risk of Sudden Infant Death Syndrome (SIDS).  Benefits to breastfeeding/chestfeeding parent:  Helps with weight loss.  Lower risk of ovarian and breast cancer, Type 2 diabetes and heart disease.  /chestfed babies are easy to take on trips. Just grab the diapers and go!  Breastfeeding/chestfeeding saves money (no formula or bottle costs, fewer doctor bills and medication costs).  Ready to breastfeed/chestfeed anywhere, don t need to make and wash bottles.  Breastfeeding/chestfeeding is wonderful, but not always easy. Learning what to expect ahead of time and get support from a lactation professional. Check out the [facility] Breastfeeding Resource Guide for classes, drop in support groups, childbirth education, Doulas and clinic and hospital lactation services.     [facility] [place] [address] provides a free, drop-in class/breastfeeding support group, facilitated by a lactation consultant and .  During the group you can connect with other parents, weigh your baby, ask questions about feeding and baby development, and more.  You do not need to register.  Fall in-person meetings will begin on September 12, are for parents of babies from birth to 9 months, and will meet on Monday evenings from 6 - 7:15 pm in [facility], which is at [address] [facility].  Psychiatric hospital  "[place] [address] also offers virtual group meetings with a lactation consultant/.  These take place on , from 11:30 am - 12:30 pm for parents of newborns to 3-month-olds, and from 4:15 - 5:15 for parents of 3 - 9 - month olds.  To get the meeting link contact Tamy Curry at [phone].    [facility] offers a free, drop-in breastfeeding support group facilitated by JUANITA Porter.   at [facility] [address], [place].  A scale is available to check baby weights, if desired.  [facility] also has a variety of new parent classes:  (cost for registration)  https://Veeva/classes/    [facility] Lactation Lounge facilitated by JUANITA Hernandez:  Free, drop-in support group for breastfeeding, with baby scale available if needed.  Meets at [facility], second Tuesday of each month, 10 am - 12 pm.  Text [phone] for info.    [facility] Cafe Support Group,  at 10:30 am   Run by JUANITA Bhardwaj of The Baby Whisperer Lactation Consultants   Go to The Baby Whisperer Lactation Consultants Facebook page, click on \"events\" for link:   Https://www.Syntec Biofuel.com/events/438516413843110/    The [place] breastfeeding support community:  free, drop-in breastfeeding support facilitated by Certified Lactation Counselors, open  and  from 1 pm - 5 pm.  In [place]:  Guiding [facility], [address] [facility]:   guidingRiverside Regional Medical Centerko.[facility] Milk Hour,  at 2:30 pm    Run by JUANITA Jones  Go to [facility] FB page and send message to get link   Https://www.Syntec Biofuel.com/healthfoundations/    Aarti Valle:  [email or web address]://[email or web address]    [facility] offers a Lactation Lounge every Friday 12pm - 1pm, run by Aarti Cazares Leader.  Sign up via link at blooma.com/cbe-lactation   https://www.The 5th Quarter/cbe-lactation    [place] Birth " [place] is offering virtual support groups every Monday, 10:30 am - 12 pm, run by RN IBCLC: Https://www.facebook.com/events/959672557810992/    Culturally-Specific Breastfeeding Support:     For Hmong Families:   The Hmong Breastfeeding Coalition is a wonderful support for [place] Hmong women who are breastfeeding.  They are best found on Facebook.    for Black families:    Lovin' Spoonfulscolate Milk Club:  http://www.M.Setek/chocolate-milk-club/  Email: Shannon@117go    R.O.S.E. = Reaching our Sisters Everywhere  Http://www.breastfeedingrose.org/    Club Mom breastfeeding support for Black mothers:  Contact Niraj Haley  Phone: [phone]   Email:  [email or web address].     Jacqueline Jarrett  Phone: [phone]   Email:  [email or web address] LogicNorth [facility].    Club Dad parent support for Black fathers:   Contact Augie Ambriz   Phone: [phone]   Email:  [email or web address]    For Native/Indigenous Families:    https://www.ClearCare.com/groups/nitamising.gimashkikinaan     Additional Resources:  La Leche League is an international, nonprofit, nonsectarian organization offering information, education, and support to mothers who want to breast-feed their babies. Local groups offer phone help and monthly meetings. Visit Appstores.com.org or Cinemacraft.org and us the  Find local support  drop down menu or click on the  Resources  tab.    [place] Breastfeeding Resources: 0-007-459-BABY (2229) toll free    National Breastfeeding Help Line trained breastfeeding peer counselors can help answer common breast-feeding questions by phone. Monday-Friday: English/Faroese  6-196- [phone] toll free, 1-720.799.1953 (TTY)    Virtual Breastfeeding Support:    During this time of isolation, breastfeeding families need even more community!  Here are some area organizations offering virtual support groups for breastfeeding:    [facility]irit Cafe Support Group, Tuesdays at 10:30 am   Run by Meme  "JUANITA Chavez of The Baby Whisperer Lactation Consultants   Go to The Baby Whisperer Lactation Consultants Facebook page and click on \"events\" for link   https://www.facebook.com/events/896141041396750/  [facility] Milk Hour,  at 2:30 pm    Run by JUANITA Jones   Go to [facility] FB page and send message to get link   https://www."Travel Later, Inc.".FairShare/healthfoundations/  [facility] holding virtual meetings the first Tuesday of each month, 8-9 pm, and the   Third Saturday, 10 - 11 am.  Go to [facility] [place] FB page; message to get link https://www.The Filter/LLLofGoldenEdwin/?hc_location=[facility]  [place] offers a Lactation Lounge every Friday 12pm - 1pm, run by Negin CazaresC[facility] Leader   Sign up via link at https://www.Trax Technology Solutions/cbe-lactation  [facility] is offering virtual support groups every Monday, 10:30 am - 12 pm, run by nurse IBCLC   Https://www.facebook.com/events/221538133082228/    Prenatal Breastfeeding Classes:      [facility] is offering virtual breastfeeding and  care classes:  https://www.Trax Technology Solutions/education-workshops  BirthEd childbirth and breastfeeding education offering virtual prenatal breastfeeding classes  https://www.birthedmn.com/workshops    Preparing for your baby:     [place] Screening Program  Http://www.health.[facility].mn.us/newbornscreening/  [place] newborns are tested soon after birth for more than 50 hidden, rare disorders, including hearing loss and critical congenital heart disease (CCHD). This site provides resources and information for families and providers.    When to call:   Appointment line and to get a hold of CNM in clinic Monday-Friday 8 am - 5 pm:  [phone].  There are some clinics with early start times (1st appointment 7:40 am) and others with evening hours (last appointment 6:20 pm).  Most are typically " open from 8 am to 5 pm.    CNM on call answering service: [phone].  Specify your hospital of choice and leave a brief message for CNM;  will then page CNM who is on call at your specified hospital and you should receive a call back with 15 minutes.  Be sure that your ringer is audible and that you can accept blocked calls so that we can get back in touch with you! This number should be reserved for urgent needs if during the day, before 8 am, after 5 pm, weekends, holidays.    Contact the on-call CNM with warning signs, such as:  vaginal bleeding   Vaginal discharge and itching or pain and burning during urination  Leg/calf pain or swelling on one side  severe abdominal pain  nausea and vomiting more than 4-5 times a day, or if you are unable to keep anything down  fever more than 100.4 degrees F.     Make plans for transportation and  as needed for when you are going to the hospital.    Ask your health care provider about vaccinations you may need following delivery. By now, you should have received a Tdap immunization to protect against pertussis or whooping cough. Fathers and family members who will be in close contact with the baby should also receive a Tdap shot at least two weeks before the expected birth of the baby if they have not had a Td (tetanus) shot for at least two years.    Tell your midwife or physician how you plan to feed your baby (breast or bottle), who you have chosen to do pediatric care for your baby, and if you have a boy, whether you have chosen to have him circumcised. You will need a car seat correctly installed in your vehicle to bring your baby home. As you start to set up the nursery at home for your baby, make sure the crib is safe. The mattress needs to fit snugly against the edges of the crib. If you can fit a soda can between the bars, they are too far apart and can allow the baby's head to caught between them.    Learn about infant care and feeding,  including information about infant CPR. We recommend that you put your baby to sleep on his or her back to reduce the chance of Sudden Infant Death Syndrome (SIDS). To maintain a healthy environment in which your child can grow, it's best to keep your home smoke-free. By preparing ahead, your transition into parenthood will go smoothly for you and your baby.    Your midwife will want to see you for a checkup 2 to 6 weeks after delivery.

## 2023-08-09 NOTE — PROGRESS NOTES
Cayla is here by herself for a routine prenatal visit at 37w6d. Denies regular contractions or change in vaginal discharge. Feeling baby move. Had growth ultrasound yesterday EFW 73%, SILVIANO 7.7cm. pt reassured because they also confirmed normal anatomy for lips and palate. Pt desires cervical exam, see flowsheet. Has number for on call midwife and knows when to call. RTC 1 week

## 2023-08-15 NOTE — PATIENT INSTRUCTIONS
"[facility] Nurse Midwives - Contact information:  Appointment line and to get a hold of CNM in clinic Monday-Friday 8 am - 5 pm:  [phone].  There are some clinics with early start times (1st appointment 7:40 am) and others with evening hours (last appointment 6:20 pm).  Most are typically open from 8 am to 5 pm.    CNM on call answering service: [phone].  Specify your hospital of choice and leave a brief message for CNM;  will then page CNM who is on call at your specified hospital and you should receive a call back with 15 minutes.  Be sure that your ringer is audible and that you can accept blocked calls so that we can get back in touch with you! This number should be reserved for urgent needs if during the day, before 8 am, after 5 pm, weekends, holidays.    Contact the on-call CNM with warning signs, such as:    vaginal bleeding     Vaginal discharge and itching or pain and burning during urination    Leg/calf pain or swelling on one side    severe abdominal pain    nausea and vomiting more than 4-5 times a day, or if you are unable to keep anything down    fever more than 100.4 degrees F.       Meet the Midwives from [facility]  You are invited to an informational meet and greet with [facility] Certified Nurse-Midwives. Our free \"Meet the Midwives\" event is a great opportunity to learn about our midwives' philosophy and experience, the hospitals where we can assist with your birth, and answer questions you may have. Partners, friends, and family are welcome to attend. Currently, this is a virtual event.  Date  First Tuesday of every month at 7 pm.    Link to next (live) meeting  https://www.[place].org/classes-and-events/meet-the-midwives-from-[facility]-clinics  To Join by Telephone (audio only) Call:   [phone] Phone Conference ID: [id number]#          Touring the Maternity Care Center  At this time we are offering a virtual tour of the " Maternity Care Centers at both [facility] and [facility]:   [facility]: https://www.[place].org/Locations/[facility]/Maternity-Care-Center  [place]:   https://[facility].org/[facility]arch[facility]-care/the-birthplace/tours  https://www.[place].org/Locations/[facility]/Maternity-Care-Center/#virtual_tour  When in person tours become available, registrations is required. To schedule a tour at either [place] or [facility], please do so online using the following links:  [facility] - https://www.[facility] [employer] [employer]/registerlist.asp?s=6&m=303&vs=5&p=2&kstvk=877&ps=1&group=37&it=1&bab=159  [place] [facility] - https://www.[employer]/registerlist.asp?s=6&m=303&vs=5&p=2&twtex=365&ps=1&group=38&it=1&eds=820    Childbirth and Parenting Education:     Free Video Series from [facility]: https://www.nursing.[facility]/laborandbirthvideos  [facility]: http://[facility] [employer].Chirply/   [phone]  [place]: (education, yoga & wellness) www.[employer]  Enlightened Mama: www.Clozeenedmama.Chirply   Childbirth collective: (Parent topic nights)  www.childbirthcollective.org/  Hypnobabies:  www.hypnobabiestwincities.com/  Hypnobirthing:  Http://hypnobirthing.com/  The Birth Hour: https://the[employer]eniMax/online-childbirth-class/    APPS and Podcasts:   Mary Villagomez  The Birth Hour (for birth stories)   Birthful   Expectful   The Longest Shortest Time  PregnancyPodcast Berenice García    Breastfeeding Information:  An excellent 15-minute video on preparing for a good breastfeeding experience, including how to ensure you have a good milk supply and a comfortable latch, can be found here:  https://firstdroplets.com/      Book Recommendations:   Latoya [place]'s Birthing From Within--first few chapters include a new-age tone, you may prefer to skip it and keep going, because there is good stuff later.  This book recommendation covers emotional preparation, but does cover  "coping with pain, and use of both pharmacological and nonpharmacological methods.    Dr. Pena' The Pregnancy Book and The Birth Book--the pregnancy book goes month-by month    Womanly Art of Breastfeeding by La Leche League International   Breastfeeding by Fariba Ramos--great pictures    Mothering Your Nursing Toddler, by Karine Medrano.   Addresses dealing with so many of the challenging behaviors of a nursing toddler.  How Weaning Happens, by La Leche League.  Discusses weaning at all ages, from medically necessary weaning of an infant, all the way up to age 5 (or older), with why/why not, and strategies.  Very empowering book both for deciding to wean and deciding not to.    American College of Nurse-Midwives (ACNM) http://www.midwife.org/; look at the informational handouts at http://www.midwife.org/Share-With-Women     www.mymidwife.org    Mother to Baby (Medication and Herbal guidance in pregnancy): http://www.mothertobaby.org  Toll-Free Hotline: 776.770.7518  LactMed (Medication use while breastfeeding): http://toxnet.nlm.nih.gov/newtoxnet/lactmed.htm    Women's Health.gov:  http://www.womenshealth.gov/a-z-topics/index.html    American pregnancy association - http://americanpregnancy.org    Centering Pregnancy (group prenatal care option): http://centeringhealthcare.org    Information about doulas:  Childbirth collective: http://www.childbirthcollective.org/  Doulas of [place] (SCOTT):  www.scott.org  [facility]  project: http://twincitiesdoulaproject.com/     Early Childhood and Family Education (ECFE):  ECFE offers parents hands-on learning experiences that will nourish a lifetime of teachable moments.  http://ecfe.info/ecfe-home/    March of Dimes www.[employer] - Nutrition  www.mypyramid.gov  Under \"For Consumers,\" click on \"pregnant and breastfeeding women.\"      Centers for Disease Control and Prevention (CDC) - Vaccines : http://www.cdc.gov/vaccines/       When " researching information on the web, question the validity of websites.  The [employer] .gov, .edu and.org tend to be more reliable information.  If there are a lot of advertisements, be cautious of the information provided. Stay away from blogs and chat rooms please!     [place] Screening Program  Http://www.health.[facility].[place].us/newbornscreening/  [place] newborns are tested soon after birth for more than 50 hidden, rare disorders, including hearing loss and critical congenital heart disease (CCHD). This site provides resources and information for families and providers.    HEALTHY PREGNANCY CARE: 37 to 41 WEEKS PREGNANT    Talk with your midwife or physician about when to call with signs of labor    Regular uterine contractions that are getting closer together and/or stronger    If you think your water has broken or is leaking    Bleeding from the vagina like a period (bloody vaginal discharge is normal)    If you are not feeling your baby move    Make plans for transportation and  as needed for when you are going to the hospital.    Your midwife or physician may offer to check your cervix for changes.     Ask your health care provider about vaccinations you may need following delivery. By now, you should have received a Tdap immunization to protect against pertussis or whooping cough. Fathers and family members who will be in close contact with the baby should also receive a Tdap shot at least two weeks before the expected birth of the baby if they have not had a Td (tetanus) shot for at least two years.    If you are past your due date, discuss the next steps leading to delivery with your midwife or physician. If you don't start labor on your own by 41 or 42 weeks, your midwife or physician may recommend giving you medicines to ripen your cervix and start labor.  Induction of labor:  http://onlinelibrary.bergman.com/store/10.1016/j.jmwh.2008.04.018/asset/j.jmwh.2008.04.018.pdf?v=1&t=vjvs7zjr&x=[email or web address]    Preparing for your baby: Tell your midwife or physician how you plan to feed your baby (breast or bottle), who you have chosen to do pediatric care for your baby, and if you have a boy, whether you have chosen to have him circumcised. You will need a car seat correctly installed in your vehicle to bring your baby home. As you start to set up the nursery at home for your baby, make sure the crib is safe. The mattress needs to fit snugly against the edges of the crib. If you can fit a soda can between the bars, they are too far apart and can allow the baby's head to caught between them.    Learn about infant care and feeding, including information about infant CPR. We recommend that you put your baby to sleep on his or her back to reduce the chance of Sudden Infant Death Syndrome (SIDS). To maintain a healthy environment in which your child can grow, it's best to keep your home smoke-free. By preparing ahead, your transition into parenthood will go smoothly for you and your baby.    Your midwife or physician will want to see you for a checkup 2 to 6 weeks after delivery.    If you have questions about any symptoms you are experiencing or any other concerns, call your provider or their clinic staff at [facility] at Dept: [phone]. If it's after clinic hours, physician patients should call the Care Connection at [phone] ([phone]); midwife patients should call their answering service at [phone].    How can you care for yourself at home?   You can refer to the Starting Out Right book or find it online at http://www.[facility]theast.org/images/stories/maternity/[facility]-Starting-Out-Right.pdf or http://www.healtheast.org/images/stories/flipbooks/[facility]-starting-out-right/[facility]-starting-out-right.html#p=8     You can sign up for a  weekly parenting e-mail that gives support, tips and advice from health care professionals that starts with pregnancy and continues through the toddler years. To register, go to www.[facility].org/baby at any time during your pregnancy.        Making Plans for Feeding My Baby    By this point, you probably have read a lot about feeding your baby.  Breastfeed or formula? Each mother s decision is her own and [facility] respects you and your choices. We ve gathered information on both breastfeeding and formula feeding to help with your decision. Talking with your physician or nurse-midwife can also help in your decision.  However you plan to feed your baby, [facility] Maternity Care Centers encourage rooming in with your baby, skin-to-skin contact and feeding your baby based on his or her cues.    Skin-to-skin contact  Being close to mom helps your baby adjust to life outside of the womb.  It helps your baby regulate their body temperature, heart rate, and breathing.  Your baby will usually be placed skin-to-skin immediately following birth or as soon as possible, if medical intervention is needed.    Rooming-In  Having your baby stay with you in your room is called  rooming-in .  Keeping your baby in your room helps you to learn how to care for your baby by getting to know your baby s cues, body rhythms and sleep cycle.       Cue-based feeding  Cues (signals) are baby s way of telling you what he or she wants.  When you learn your infant s cues, you know how to care for and feed your baby.   Feeding cues are the licking and smacking of lips, bringing their fist to their mouth, and a reflex called  rooting - where baby turns and opens his or her mouth, searching for the breast or bottle.  Crying is a late feeding cue.  Babies can feed frequently, often at least 8 times in 24 hours.    Breastfeeding facts  Breast milk is the best source of nutrition for your baby and is available at birth. In the first couple of days,  your milk volume is already starting to increase, though it may not be noticeable. Breastfeed frequently to increase your milk supply. Within three to five days, you will begin to notice larger milk volumes. An increase in breast size, heaviness and firmness are often described as the milk  coming in.  Frequent breastfeeding can help breasts from getting overly firm and painful. You will know the baby is getting enough milk if your baby is having wet and dirty diapers and gaining weight.     If your goal is to exclusively breastfeed, it is important to not use any formula or artificial nipples (including bottles and pacifiers) while your baby is learning to breastfeed.  While it may seem like an  easy  option to give your baby a bottle, formula should only be given if there is a medical reason for your baby to have it.      Positioning and attachment   Get comfortable.  Use pillows as needed to support your arms and baby.  Hold baby close at the level of your breast, facing you in a tummy to tummy position.  Skin to skin helps with this.  Position the baby with his or her nose by the nipple.  There should be a straight line from baby s ear to shoulder to hips.  Tickle your baby s lips or wait for baby to open mouth wide, bring baby to breast by leading with the chin.  Aim the nipple at the roof of baby s mouth.  A rapid sucking pattern is followed by longer, drawing pattern with occasional swallows heard.  When baby is correctly latched, your nipple and much of the areola are pulled well into baby s mouth.      Returning to work or school  Focus on a good start to breastfeeding.  Many women continue to provide breastmilk for their baby when they return to work or school.  Making plans about where to pump and store milk can make the transition go well.  Talk with other mothers who have also returned to work or school for tips and support.  Your employer s Human Resource department may be a resource as well.      Returning to work or school: (continued)     babies can mean fewer  sick  days for you.    A quality breast pump will also save time and add comfort.  Check with your insurance prior to giving birth for breast pump coverage.  Many insurance companies include a pump within your benefits.    Wait until your baby is at least three weeks old to introduce a bottle for the first time.  Have someone besides you give the bottle.    Breastfeed when you are with your baby. Reserve your bottles of breast milk for when you are away.     Your breasts will need to be  emptied  either by your baby or a pump.  Plan to pump at least twice in an eight hour day.    If you cannot pump at work, continue breastfeeding at home. Any amount of breast milk is worth giving to your baby.    Formula feeding facts  If you are planning to use formula to feed your baby, you will want to make some preparations ahead of time. Talk to your doctor or nurse-midwife about what type of formula to use. Some are iron-fortified, meaning they have extra iron in them. You will want to purchase formula and bottles before your baby is born to be sure you are ready after you return from the hospital. The [facility] do not provide formula samples to take home.    Be sure to follow formula mixing directions closely. Regular milk in the dairy case at the grocery store should not be given to babies under 1 year old. Baby formula is sold in several forms including:    Ready-to-use. This is the most expensive, but no mixing is necessary.    Concentrated liquid. This is less expensive than ready-to-use and you mix with water.    Powder. This is the least expensive. You mix one level scoop of powdered formula with two ounces of water and stir well.    Most babies need 2.5 ounces of formula per pound of body weight each day. This means an 8-pound baby may drink about 20 ounces of formula a day; however, this is just an estimate. The most important  thing is to pay attention to your baby s cues.  If your baby is always fussy, needs more iron or has certain food allergies, your physician may suggest you change your baby s formula to a different kind.     How do I warm my baby s bottles?  You may feed your baby a bottle without warming it first. It is OK for the breast milk or formula to be cool or room temperature. If your baby seems to prefer it warmed, you can put the filled bottle in a container of warm water and let it stand for a few minutes. Check the temperature of the liquid on your skin before feeding it to your baby; to be sure it isn t too hot. Do not heat bottles in the microwave. Microwaves heat food and liquids unevenly, and this can cause hot spots that can burn your baby.    How do I clean and sterilize bottles?  Sterilize bottles and nipples before you use them for the first time. You can do this by putting them in boiling water for 5 minutes. After that first time, you can wash them in hot and soapy water. Rinse them carefully to be sure there is no soap left on them. You can also wash them in the .    [facility] Connection [phone] ([phone])    Baby Café    Pregnant and interested in breastfeeding?  Need answers to breastfeeding questions?  Want to help breastfeeding moms?  Already breastfeeding and want to meet other moms?    Join us at the Baby Café!    Baby Cafe is a free, drop-in service offering breast-feeding support for pregnant women, breast-feeding mothers and their families.  Come share tips and socialize with other mothers.  Babies and siblings are welcome (no childcare available).    Starting April 2018, Baby Café will be at 4 locations.  Please see below for the Baby Café closest to you!      [facility]  [address] [place] [address]  1st Wednesday: 10am-12pm    [facility]  [address] [place] [address]  3rd Wednesday 4-6pm    [facility]  [address] [place]  "[id number]   10am-12:30pm    [facility]  [address] [place] [address]  : 4-6pm      Hmong, Swedish, and Lithuanian which is may be available at some sites.    For more information, please contact: Cecily Acosta@[facility]. or [phone]      Virtual Breastfeeding Support:    During this time of isolation, breastfeeding families need even more community!  Here are some area organizations offering virtual support groups for breastfeeding:      [facility] Support Group,  at 10:30 am   Run by JUANITA Bhardwaj of [facility]   Go to [facility] [facility] Facebook page and click on \"events\" for link   [email or web address]://[email or web address].[email or web address]    [facility] Milk Hour,  at 2:30 pm    Run by JUANITA Jones   Go to [facility] FB page and send message to get link   https://www.[employer]ove.[employer]/healthfoundations/    [facility] holding virtual meetings the first Tuesday of each month, 8-9 pm, and the   Third Saturday, 10 - 11 am.  Go to [facility] and [facility] FB page; message to get link https://www.[employer]moove.[employer]/LLLofGoldenValley/?hc_location=[facility]    [facility] offers a Lactation Lounge every Friday 12pm - 1pm, run by Negin Cazares[facility] Leader   Sign up via link at [employer]/cbe-lactation   https://www.[employer]/cbe-lactation    [facility] is offering virtual support groups every Monday, 10:30 am - 12 pm, run by nurse IBCLC   Https://www.facebook.com/events/[email or web address]/    Prenatal Breastfeeding Classes:        [facility] is offering virtual breastfeeding and  care classes:  https://www.[employer]/education-workshops    [employer] childbirth and breastfeeding education offering virtual prenatal breastfeeding " classes  https://www.Dana [employer].com/workshops       done

## 2023-08-16 ENCOUNTER — PRENATAL OFFICE VISIT (OUTPATIENT)
Dept: MIDWIFE SERVICES | Facility: CLINIC | Age: 33
End: 2023-08-16
Payer: COMMERCIAL

## 2023-08-16 VITALS
DIASTOLIC BLOOD PRESSURE: 64 MMHG | HEART RATE: 85 BPM | SYSTOLIC BLOOD PRESSURE: 112 MMHG | OXYGEN SATURATION: 98 % | WEIGHT: 162 LBS | BODY MASS INDEX: 26.03 KG/M2 | HEIGHT: 66 IN

## 2023-08-16 DIAGNOSIS — Z34.80 SUPERVISION OF OTHER NORMAL PREGNANCY, ANTEPARTUM: Primary | ICD-10-CM

## 2023-08-16 PROCEDURE — 99207 PR PRENATAL VISIT: CPT | Performed by: ADVANCED PRACTICE MIDWIFE

## 2023-08-16 NOTE — PATIENT INSTRUCTIONS
"\"We hope you had a positive experience and that you can definitely recommend [facility] to your family and friends. You ll be receiving a survey soon and we look forward to hearing your feedback\".    [facility] - Contact information:  Appointment line and to get a hold of CNM in clinic Monday-Friday 8 am - 5 pm:  [phone].  There are some clinics with early start times (1st appointment 7:40 am) and others with evening hours (last appointment 6:20 pm).  Most are typically open from 8 am to 5 pm.    CNM on call answering service: [phone].  Specify your hospital of choice and leave a brief message for CNM;  will then page CNM who is on call at your specified hospital and you should receive a call back with 15 minutes.  Be sure that your ringer is audible and that you can accept blocked calls so that we can get back in touch with you! This number should be reserved for urgent needs if during the day, before 8 am, after 5 pm, weekends, holidays.    Contact the on-call CNM with warning signs, such as:  vaginal bleeding   Vaginal discharge and itching or pain and burning during urination  Leg/calf pain or swelling on one side  severe abdominal pain  nausea and vomiting more than 4-5 times a day, or if you are unable to keep anything down  fever more than 100.4 degrees F.     iSoccerhart  After each of your visits you are welcome to check [employer] for your visit summary including education and links to information relevant to your pregnancy and/or well woman care.   Find the \"Visits\" tab at the top of the page, you will see a list of recent visits and for each visit a for link for \"View After Visit Summary.\" View of your After Visit Summary will allow you to read our recommendations from your visit, review any education provided, and link to websites with useful information.   If you have any questions or difficulty navigating [employer], please feel free to " "contact us and we will do our best to direct you.    Meet the Midwives from [facility]  You are invited to an informational meet and greet with [facility] Certified Nurse-Midwives. Our free \"Meet the Midwives\" event is a great opportunity to learn about our midwives' philosophy and experience, the hospitals where we can assist with your birth, and answer questions you may have. Partners, friends, and family are welcome to attend. Currently, this is a virtual event.  Dates: Last Thursday of every month at 7 pm.    Link to next (live) meeting  [email or web address]  To Join by Telephone (audio only) Call:   [phone] Phone Conference ID: [phone] #      Touring the Maternity Care Center  At this time we are offering a virtual tour of the Maternity Care Centers at both [facility] and [facility]:   [facility] Maternity Care:   [email or web address]://[email or web address].[email or web address]/locations/the-birthplace-[facility]:   [email or web address]://[facility].org/locations/the-[facility]place-[facility]    Scroll to the bottom of this hyperlink if the above link does not work      Childbirth and Parenting Education:     Everyday Miracles:   https://www.everyday-miracles.org/    Free Video Series from [facility]: https://nursing.[facility]/academics/specialty-areas/nurse-midwifery/having-baby-prenatal-videos/having-baby-prenatal-and    Childbirth Education virtual (live) classes: www.Tistagames/classes  The Birth Hour: https://Digital Railroad/online-childbirth-class/  BirthED: https://www.birthedmn.com/  FLORINDA parenting center: http://ammapareM.T. [employer].mycujoo/   (240) 671-BABY  [place]: (education, yoga & wellness) www.Videonetics Technologies.mycujoo  Enlightened Mama: www.enlightenedmama.com   Childbirth collective: (Parent topic nights)  www.childbirthcollective.org/  Hypnobabies:  " www.hypneduPadbiestSMXcities.com/  Hypnobirthing:  Http://hypn"Intelligent Currency Validation Network[employer] Inc[employer].Momail/  Hypnobirthing virtual class: www.TAGSYS RFID GroupttIT Tradingbirth.Momail/hypnobirthing    Information about doulas:  Childbirth collective: http://www.childbirthcollective.org/  Doulas of [place] (SCOTT):  www.scott.org  [place]  project: http://TowerJazzdoBizeso Services Private Limitedject.Momail/     Early Childhood and Family Education (ECFE):  ECFE offers parents hands-on learning experiences that will nourish a lifetime of teachable moments.  http://ecfe.info/ecfe-home/    APPS and Podcasts:   Mary Clarke Nurture    Evidence Based Birth  The Birth Hour (for birth stories)   Birthful   Expectful   The Longest Shortest Time  PregnancyPodcast Beernice García    Book Recommendations:   Latoya [place]'s Birthing From Within--first few chapters include a new-age tone, you may prefer to skip it and keep going, because there is good stuff later.  This book recommendation covers emotional preparation, but does cover coping with pain, and use of both pharmacological and nonpharmacological methods.    Guide to Childbirth by Almita Mcmahan  Childbirth Without Fear by Stacy Lizarraga Read    Dr. Pena' The Pregnancy Book and The Birth Book--the pregnancy book goes month-by month    The Birth Partner by Laura Garcia    Womanly Art of Breastfeeding by La Leche League International   Bestfeeding by Fariba Ramos--great pictures    Mothering Your Nursing Toddler, by Karine Medrano.   Addresses dealing with so many of the challenging behaviors of a nursing toddler.  How Weaning Happens, by La Leche League.  Discusses weaning at all ages, from medically necessary weaning of an infant, all the way up to age 5 (or older), with why/why not, and strategies.  Very empowering book both for deciding to wean and deciding not to.    American College of Nurse-Midwives (ACNM) http://www.midwife.org/; look at the informational handouts at http://www.midwife.org/Share-With-Women    "  www.mymidwife.org    Mother to Baby (Medication and Herbal guidance in pregnancy): http://www.mothertobaby.org  Toll-Free Hotline: 725.676.9918  LactMed (Medication use while breastfeeding): http://toxnet.nlm.nih.gov/newtoxnet/lactmed.htm    Women's Health.gov:  http://www.womenshealth.gov/a-z-topics/index.html    American pregnancy association - http://americanpregnancy.org    Centering Pregnancy (group prenatal care option): http://centeringhealthcare.org    March of Dimes www.Floqq.ActivityHero     FDA - Nutrition  www.mypyramid.gov  Under \"For Consumers,\" click on \"pregnant and breastfeeding women.\"      Centers for Disease Control and Prevention (CDC) - Vaccines : http://www.cdc.gov/vaccines/       When researching information on the web, question the validity of websites.  The BettingXpert .PacketFront, Liligo.com and.org tend to be more reliable information.  If there are a lot of advertisements, be cautious of the information provided. Stay away from blogs and chat rooms please!     Making Plans for Feeding My Baby    By this point, you probably have read a lot about feeding your baby.  Breastfeed or formula? Each parent's decision is their own and [facility] respects you and your choices. We ve gathered information on both breastfeeding and formula feeding to help with your decision. Talking with your nurse-midwife can also help in your decision.  However you plan to feed your baby, [facility] encourage rooming in with your baby, skin-to-skin contact and feeding your baby based on his or her cues.    Skin-to-skin contact  Being close to mom helps your baby adjust to life outside of the womb.  It helps your baby regulate their body temperature, heart rate, and breathing.  Your baby will usually be placed skin-to-skin immediately following birth or as soon as possible, if medical intervention is needed.    Rooming-In  Having your baby stay with you in your room is " called  rooming-in .  Keeping your baby in your room helps you to learn how to care for your baby by getting to know your baby s cues, body rhythms and sleep cycle.       Cue-based feeding  Cues (signals) are baby s way of telling you what he or she wants.  When you learn your infant s cues, you know how to care for and feed your baby.   Feeding cues are the licking and smacking of lips, bringing their fist to their mouth, and a reflex called  rooting - where baby turns and opens his or her mouth, searching for the breast or bottle.  Crying is a late feeding cue.  Babies can feed frequently, often at least 8 times in 24 hours.    Breastfeeding facts  Breast milk is the best source of nutrition for your baby and is available at birth. In the first couple of days, your milk volume is already starting to increase, though it may not be noticeable. Breastfeed frequently to increase your milk supply. Within three to five days, you will begin to notice larger milk volumes. An increase in breast size, heaviness and firmness are often described as the milk  coming in.  Frequent breastfeeding can help breasts from getting overly firm and painful. You will know the baby is getting enough milk if your baby is having wet and dirty diapers and gaining weight.     If your goal is to exclusively breastfeed, it is important to not use any formula or artificial nipples (including bottles and pacifiers) while your baby is learning to breastfeed.  While it may seem like an  easy  option to give your baby a bottle, formula should only be given if there is a medical reason for your baby to have it.      Positioning and attachment   Get comfortable.  Use pillows as needed to support your arms and baby.  Hold baby close at the level of your breast, facing you in a tummy to tummy position.  Skin to skin helps with this.  Position the baby with his or her nose by the nipple.  There should be a straight line from baby s ear to shoulder to  hips.  Tickle your baby s lips or wait for baby to open mouth wide, bring baby to breast by leading with the chin.  Aim the nipple at the roof of baby s mouth.  A rapid sucking pattern is followed by longer, drawing pattern with occasional swallows heard.  When baby is correctly latched, your nipple and much of the areola are pulled well into baby s mouth.      Returning to work or school  Focus on a good start to breastfeeding.  Many women continue to provide breastmilk for their baby when they return to work or school.  Making plans about where to pump and store milk can make the transition go well.  Talk with other mothers who have also returned to work or school for tips and support.  Your employer s Human Resource department may be a resource as well.     Returning to work or school: (continued)   babies can mean fewer  sick  days for you.  A quality breast pump will also save time and add comfort.  Check with your insurance prior to giving birth for breast pump coverage.  Many insurance companies include a pump within your benefits.  Wait until your baby is at least three weeks old to introduce a bottle for the first time.  Have someone besides you give the bottle.  Breastfeed when you are with your baby. Reserve your bottles of breast milk for when you are away.   Your breasts will need to be  emptied  either by your baby or a pump.  Plan to pump at least twice in an eight hour day.  If you cannot pump at work, continue breastfeeding at home. Any amount of breast milk is worth giving to your baby.    Formula feeding facts  If you are planning to use formula to feed your baby, you will want to make some preparations ahead of time. Talk to your doctor or nurse-midwife about what type of formula to use. Some are iron-fortified, meaning they have extra iron in them. You will want to purchase formula and bottles before your baby is born to be sure you are ready after you return from the hospital. The  [facility] [place] hospitals do not provide formula samples to take home.    Be sure to follow formula mixing directions closely. Regular milk in the dairy case at the grocery store should not be given to babies under 1 year old. Baby formula is sold in several forms including:  Ready-to-use. This is the most expensive, but no mixing is necessary.  Concentrated liquid. This is less expensive than ready-to-use and you mix with water.  Powder. This is the least expensive. You mix one level scoop of powdered formula with two ounces of water and stir well.    Most babies need 2.5 ounces of formula per pound of body weight each day. This means an 8-pound baby may drink about 20 ounces of formula a day; however, this is just an estimate. The most important thing is to pay attention to your baby s cues.  If your baby is always fussy, needs more iron or has certain food allergies, your physician may suggest you change your baby s formula to a different kind.     How do I warm my baby s bottles?  You may feed your baby a bottle without warming it first. It is OK for the breast milk or formula to be cool or room temperature. If your baby seems to prefer it warmed, you can put the filled bottle in a container of warm water and let it stand for a few minutes. Check the temperature of the liquid on your skin before feeding it to your baby; to be sure it isn t too hot. Do not heat bottles in the microwave. Microwaves heat food and liquids unevenly, and this can cause hot spots that can burn your baby.    How do I clean and sterilize bottles?  Sterilize bottles and nipples before you use them for the first time. You can do this by putting them in boiling water for 5 minutes. After that first time, you can wash them in hot and soapy water. Rinse them carefully to be sure there is no soap left on them. You can also wash them in the .    Breastfeeding/Chestfeeding Support  Contact us  Breastfeeding/chestfeeding is the natural and  healthy way for parents to feed their babies and provides the best source of nutrition in most cases to infants. Breast milk has health benefits for mom, too. The American Academy of Pediatrics recommends exclusively breastfeeding for 6 months for optimal growth and development and breastfeeding up to at least a year.  Benefits to baby:  Easy digestion, less diarrhea and constipation, breast milk is easy to digest.  Lots of bonding with parent.  Less likely to have asthma.  Less ear infections and respiratory infections.  Less likely to have Type 2 Diabetes.  Less likely to become obese.  Lower risk of Sudden Infant Death Syndrome (SIDS).  Benefits to breastfeeding/chestfeeding parent:  Helps with weight loss.  Lower risk of ovarian and breast cancer, Type 2 diabetes and heart disease.  /chestfed babies are easy to take on trips. Just grab the diapers and go!  Breastfeeding/chestfeeding saves money (no formula or bottle costs, fewer doctor bills and medication costs).  Ready to breastfeed/chestfeed anywhere, don t need to make and wash bottles.  Breastfeeding/chestfeeding is wonderful, but not always easy. Learning what to expect ahead of time and get support from a lactation professional. Check out the [facility] Breastfeeding Resource Guide for classes, drop in support groups, childbirth education, Doulas and clinic and hospital lactation services.     [facility] [place] [address] provides a free, drop-in class/breastfeeding support group, facilitated by a lactation consultant and .  During the group you can connect with other parents, weigh your baby, ask questions about feeding and baby development, and more.  You do not need to register.  Fall in-person meetings will begin on September 12, are for parents of babies from birth to 9 months, and will meet on Monday evenings from 6 - 7:15 pm in [facility], which is at [address] [facility].  [facility]  "[place] [address] also offers virtual group meetings with a lactation consultant/.  These take place on , from 11:30 am - 12:30 pm for parents of newborns to 3-month-olds, and from 4:15 - 5:15 for parents of 3 - 9 - month olds.  To get the meeting link contact Tamy Curry at [phone].    [facility] offers a free, drop-in breastfeeding support group facilitated by JUANITA Porter.   at [facility] [address], [place].  A scale is available to check baby weights, if desired.  [place] also has a variety of new parent classes:  (cost for registration)  https://youcalc/classes/    [facility] Lactation Lounge facilitated by JUANITA Hernandez:  Free, drop-in support group for breastfeeding, with baby scale available if needed.  Meets at [facility], second Tuesday of each month, 10 am - 12 pm.  Text [phone] for info.    [place] [facility] Support Group,  at 10:30 am   Run by JUANITA Bhardwaj of The Baby Whisperer Lactation Consultants   Go to The Baby Whisperer Lactation Consultants Facebook page, click on \"events\" for link:   Https://www.Synthox.com/events/[email or web address]/    [facility] [place] breastfeeding support community:  free, drop-in breastfeeding support facilitated by Certified Lactation Counselors, open  and  from 1 pm - 5 pm.  In [place]:  Guiding [facility], [address] [facility]:   guidingLewisGale Hospital Alleghanyko.[facility] Milk Hour,  at 2:30 pm    Run by JUANITA Jones  Go to [facility] FB page and send message to get link   Https://www.Synthox.com/healthfoundations/    Aarti Valle:  http://[email or web address]    [facility] offers a Lactation Lounge every Friday 12pm - 1pm, run by Aarti Cazares Leader.  Sign up via link at blooma.com/cbe-lactation   https://www.Glance App/cbe-lactation    [place] [facility] " [place] is offering virtual support groups every Monday, 10:30 am - 12 pm, run by RN IBCLC: Https://www.facebook.com/events/721229448157009/    Culturally-Specific Breastfeeding Support:     For Hmong Families:   The Hmong Breastfeeding Coalition is a wonderful support for [place] Hmong women who are breastfeeding.  They are best found on Facebook.    for Black families:    Discount Rampscolate Milk Club:  http://www.e-Go aeroplanes/chocolate-milk-club/  Email: Shannon@[employer]    R.O.S.E. = Reaching our Sisters Everywhere  Http://www.breastfeedingrose.org/    Club Mom breastfeeding support for Black mothers:  Contact Niraj Haley  Phone: [phone]   Email:  [email or web address].     Jacqueline Jarrett  Phone: [phone]   Email:  Meri@[email or web address] Hospital for [facility].    Club Dad parent support for Black fathers:   Contact Augie Ambriz   Phone: [phone]   Email:  [email or web address]    For Native/Indigenous Families:    https://www.Advanced Animal Diagnostics.com/groups/nitamising.gimashkikinaan     Additional Resources:  La Leche League is an international, nonprofit, nonsectarian organization offering information, education, and support to mothers who want to breast-feed their babies. Local groups offer phone help and monthly meetings. Visit MobPartner.org or FreeWheel.org and us the  Find local support  drop down menu or click on the  Resources  tab.    [place] Breastfeeding Resources: 7-131-690-BABY (2229) toll free    National Breastfeeding Help Line trained breastfeeding peer counselors can help answer common breast-feeding questions by phone. Monday-Friday: English/English  [phone] toll free, [phone] (TTY)    Virtual Breastfeeding Support:    During this time of isolation, breastfeeding families need even more community!  Here are some area organizations offering virtual support groups for breastfeeding:    [facility] Support Group, Tuesdays at 10:30 am   Run by Meme  "JUANITA Chavez of The Baby Whisperer Lactation Consultants   Go to The Baby Whisperer Lactation Consultants Facebook page and click on \"events\" for link   https://www.facebook.com/events/171248905117463/  [facility] Milk Hour,  at 2:30 pm    Run by JUANITA Jones   Go to [facility] FB page and send message to get link   https://www.RunAlong.Flirtatious Labs/healthfoundations/  [facility] holding virtual meetings the first Tuesday of each month, 8-9 pm, and the   Third Saturday, 10 - 11 am.  Go to [facility] [place] FB page; message to get link https://www.Cosyforyou/LLLofGoldenEdwin/?hc_location=[facility]  [place] offers a Lactation Lounge every Friday 12pm - 1pm, run by Negin CazaresUNC Health Wayne Leader   Sign up via link at https://www.Fortify Software/cbe-lactation  [facility] is offering virtual support groups every Monday, 10:30 am - 12 pm, run by nurse IBCLC   Https://www.facebook.com/events/794831985016877/    Prenatal Breastfeeding Classes:      [facility] is offering virtual breastfeeding and  care classes:  https://www.Fortify Software/education-workshops  BirthEd childbirth and breastfeeding education offering virtual prenatal breastfeeding classes  https://www.birthedmn.com/workshops    Preparing for your baby:     [place] Screening Program  Http://www.health.[facility].mn.us/newbornscreening/  [place] newborns are tested soon after birth for more than 50 hidden, rare disorders, including hearing loss and critical congenital heart disease (CCHD). This site provides resources and information for families and providers.    When to call:   Appointment line and to get a hold of CNM in clinic Monday-Friday 8 am - 5 pm:  [phone].  There are some clinics with early start times (1st appointment 7:40 am) and others with evening hours (last appointment 6:20 pm).  Most are typically " open from 8 am to 5 pm.    CNM on call answering service: [phone].  Specify your hospital of choice and leave a brief message for CNM;  will then page CNM who is on call at your specified hospital and you should receive a call back with 15 minutes.  Be sure that your ringer is audible and that you can accept blocked calls so that we can get back in touch with you! This number should be reserved for urgent needs if during the day, before 8 am, after 5 pm, weekends, holidays.    Contact the on-call CNM with warning signs, such as:  vaginal bleeding   Vaginal discharge and itching or pain and burning during urination  Leg/calf pain or swelling on one side  severe abdominal pain  nausea and vomiting more than 4-5 times a day, or if you are unable to keep anything down  fever more than 100.4 degrees F.     Make plans for transportation and  as needed for when you are going to the hospital.    Ask your health care provider about vaccinations you may need following delivery. By now, you should have received a Tdap immunization to protect against pertussis or whooping cough. Fathers and family members who will be in close contact with the baby should also receive a Tdap shot at least two weeks before the expected birth of the baby if they have not had a Td (tetanus) shot for at least two years.    Tell your midwife or physician how you plan to feed your baby (breast or bottle), who you have chosen to do pediatric care for your baby, and if you have a boy, whether you have chosen to have him circumcised. You will need a car seat correctly installed in your vehicle to bring your baby home. As you start to set up the nursery at home for your baby, make sure the crib is safe. The mattress needs to fit snugly against the edges of the crib. If you can fit a soda can between the bars, they are too far apart and can allow the baby's head to caught between them.    Learn about infant care and feeding,  including information about infant CPR. We recommend that you put your baby to sleep on his or her back to reduce the chance of Sudden Infant Death Syndrome (SIDS). To maintain a healthy environment in which your child can grow, it's best to keep your home smoke-free. By preparing ahead, your transition into parenthood will go smoothly for you and your baby.    Your midwife will want to see you for a checkup 2 to 6 weeks after delivery.

## 2023-08-16 NOTE — PROGRESS NOTES
Cayla presents with Davon for a routine PNV at 38w6d.  Feeling good!  Discussed:  1.) FMLA paperwork brought in, filled out, and handed back to Cayla.  2.) Desires SVE:1 cm/ 0% effaced/ -2 station/ soft/ posterior and to maternal left.  Bland = 4    Fetal position BEATRICE.    Reviewed maternal growth chart.    RTC 1 week, sooner as needed.    Has CNM numbers and aware to call with DFM, right away with active labor, LOF, or any questions or concerns.    NELDA Ibrahim CNM    8/16/2023   2:49 PM

## 2023-08-23 ENCOUNTER — PRENATAL OFFICE VISIT (OUTPATIENT)
Dept: MIDWIFE SERVICES | Facility: CLINIC | Age: 33
End: 2023-08-23
Payer: COMMERCIAL

## 2023-08-23 VITALS
OXYGEN SATURATION: 99 % | BODY MASS INDEX: 26.15 KG/M2 | DIASTOLIC BLOOD PRESSURE: 68 MMHG | WEIGHT: 162 LBS | HEART RATE: 76 BPM | SYSTOLIC BLOOD PRESSURE: 124 MMHG

## 2023-08-23 DIAGNOSIS — Z34.80 SUPERVISION OF OTHER NORMAL PREGNANCY, ANTEPARTUM: Primary | ICD-10-CM

## 2023-08-23 PROCEDURE — 99207 PR PRENATAL VISIT: CPT | Performed by: ADVANCED PRACTICE MIDWIFE

## 2023-08-23 NOTE — PROGRESS NOTES
Cayla Lerner is here  with her partner  for a routine prenatal visit at 39w6d.  She has no concerns and is feeling well.  Denies leakage of fluid, vaginal bleeding or changes in fetal movement. Denies any regular contractions.   Fetal movement is normal. Interval weight gain is excessive.   Discussed postdates management, risks of expectant management to 42 weeks if BPP/NST are normal and no medical indications for IOL arise sooner vs. IOL at 41+ weeks.   Pt may desire Induction of labor at 41 weeks, will schedule a clinic appointment for early next week (Monday or Tuesday) to make a plan. Patient hoping that labor will start prior, as she has had all 3 of her children at this point in pregnancy.   Cervix today: 1 cm/20%/-1/very posterior/medium consistency    Advised she do fetal movement counts daily. Anticipatory guidance, signs of symptoms of labor or SROM, third trimester warning signs, when to call and CNM contact information reviewed.    Return to Clinic in 1 week

## 2023-08-28 ENCOUNTER — HOSPITAL ENCOUNTER (INPATIENT)
Facility: CLINIC | Age: 33
LOS: 1 days | Discharge: HOME | End: 2023-08-29
Attending: MIDWIFE | Admitting: MIDWIFE
Payer: COMMERCIAL

## 2023-08-28 ENCOUNTER — TELEPHONE (OUTPATIENT)
Dept: MIDWIFE SERVICES | Facility: CLINIC | Age: 33
End: 2023-08-28

## 2023-08-28 PROBLEM — Z36.89 ENCOUNTER FOR TRIAGE IN PREGNANT PATIENT: Status: ACTIVE | Noted: 2023-08-28

## 2023-08-28 PROBLEM — Z37.9 NORMAL LABOR: Status: ACTIVE | Noted: 2023-08-28

## 2023-08-28 LAB
ABO/RH(D): NORMAL
ANTIBODY SCREEN: NEGATIVE
HGB BLD-MCNC: 13.4 G/DL (ref 11.7–15.7)
SPECIMEN EXPIRATION DATE: NORMAL
T PALLIDUM AB SER QL: NONREACTIVE

## 2023-08-28 PROCEDURE — 722N000001 HC LABOR CARE VAGINAL DELIVERY SINGLE

## 2023-08-28 PROCEDURE — 999N000016 HC STATISTIC ATTENDANCE AT DELIVERY

## 2023-08-28 PROCEDURE — 250N000009 HC RX 250: Performed by: MIDWIFE

## 2023-08-28 PROCEDURE — 85018 HEMOGLOBIN: CPT | Performed by: MIDWIFE

## 2023-08-28 PROCEDURE — 250N000011 HC RX IP 250 OP 636: Mod: JZ | Performed by: MIDWIFE

## 2023-08-28 PROCEDURE — 86850 RBC ANTIBODY SCREEN: CPT | Performed by: MIDWIFE

## 2023-08-28 PROCEDURE — 250N000013 HC RX MED GY IP 250 OP 250 PS 637: Performed by: MIDWIFE

## 2023-08-28 PROCEDURE — 258N000003 HC RX IP 258 OP 636: Performed by: MIDWIFE

## 2023-08-28 PROCEDURE — 36415 COLL VENOUS BLD VENIPUNCTURE: CPT | Performed by: MIDWIFE

## 2023-08-28 PROCEDURE — 86901 BLOOD TYPING SEROLOGIC RH(D): CPT | Performed by: MIDWIFE

## 2023-08-28 PROCEDURE — 86780 TREPONEMA PALLIDUM: CPT | Performed by: MIDWIFE

## 2023-08-28 PROCEDURE — 120N000001 HC R&B MED SURG/OB

## 2023-08-28 PROCEDURE — 59400 OBSTETRICAL CARE: CPT | Performed by: MIDWIFE

## 2023-08-28 RX ORDER — OXYTOCIN/0.9 % SODIUM CHLORIDE 30/500 ML
100-340 PLASTIC BAG, INJECTION (ML) INTRAVENOUS CONTINUOUS PRN
Status: DISCONTINUED | OUTPATIENT
Start: 2023-08-28 | End: 2023-08-29 | Stop reason: HOSPADM

## 2023-08-28 RX ORDER — KETOROLAC TROMETHAMINE 30 MG/ML
30 INJECTION, SOLUTION INTRAMUSCULAR; INTRAVENOUS
Status: DISCONTINUED | OUTPATIENT
Start: 2023-08-28 | End: 2023-08-29 | Stop reason: HOSPADM

## 2023-08-28 RX ORDER — METOCLOPRAMIDE 10 MG/1
10 TABLET ORAL EVERY 6 HOURS PRN
Status: DISCONTINUED | OUTPATIENT
Start: 2023-08-28 | End: 2023-08-28 | Stop reason: HOSPADM

## 2023-08-28 RX ORDER — OXYTOCIN 10 [USP'U]/ML
10 INJECTION, SOLUTION INTRAMUSCULAR; INTRAVENOUS
Status: DISCONTINUED | OUTPATIENT
Start: 2023-08-28 | End: 2023-08-29 | Stop reason: HOSPADM

## 2023-08-28 RX ORDER — BISACODYL 10 MG
10 SUPPOSITORY, RECTAL RECTAL DAILY PRN
Status: DISCONTINUED | OUTPATIENT
Start: 2023-08-28 | End: 2023-08-29 | Stop reason: HOSPADM

## 2023-08-28 RX ORDER — LIDOCAINE 40 MG/G
CREAM TOPICAL
Status: DISCONTINUED | OUTPATIENT
Start: 2023-08-28 | End: 2023-08-28 | Stop reason: HOSPADM

## 2023-08-28 RX ORDER — NALOXONE HYDROCHLORIDE 0.4 MG/ML
0.4 INJECTION, SOLUTION INTRAMUSCULAR; INTRAVENOUS; SUBCUTANEOUS
Status: DISCONTINUED | OUTPATIENT
Start: 2023-08-28 | End: 2023-08-28 | Stop reason: HOSPADM

## 2023-08-28 RX ORDER — MISOPROSTOL 200 UG/1
400 TABLET ORAL
Status: DISCONTINUED | OUTPATIENT
Start: 2023-08-28 | End: 2023-08-28 | Stop reason: HOSPADM

## 2023-08-28 RX ORDER — MISOPROSTOL 200 UG/1
800 TABLET ORAL
Status: DISCONTINUED | OUTPATIENT
Start: 2023-08-28 | End: 2023-08-29 | Stop reason: HOSPADM

## 2023-08-28 RX ORDER — OXYTOCIN 10 [USP'U]/ML
10 INJECTION, SOLUTION INTRAMUSCULAR; INTRAVENOUS
Status: DISCONTINUED | OUTPATIENT
Start: 2023-08-28 | End: 2023-08-28 | Stop reason: HOSPADM

## 2023-08-28 RX ORDER — METHYLERGONOVINE MALEATE 0.2 MG/ML
200 INJECTION INTRAVENOUS
Status: DISCONTINUED | OUTPATIENT
Start: 2023-08-28 | End: 2023-08-28 | Stop reason: HOSPADM

## 2023-08-28 RX ORDER — METHYLERGONOVINE MALEATE 0.2 MG/ML
200 INJECTION INTRAVENOUS
Status: DISCONTINUED | OUTPATIENT
Start: 2023-08-28 | End: 2023-08-29 | Stop reason: HOSPADM

## 2023-08-28 RX ORDER — PROCHLORPERAZINE 25 MG
25 SUPPOSITORY, RECTAL RECTAL EVERY 12 HOURS PRN
Status: DISCONTINUED | OUTPATIENT
Start: 2023-08-28 | End: 2023-08-28 | Stop reason: HOSPADM

## 2023-08-28 RX ORDER — CARBOPROST TROMETHAMINE 250 UG/ML
250 INJECTION, SOLUTION INTRAMUSCULAR
Status: DISCONTINUED | OUTPATIENT
Start: 2023-08-28 | End: 2023-08-28 | Stop reason: HOSPADM

## 2023-08-28 RX ORDER — OXYTOCIN/0.9 % SODIUM CHLORIDE 30/500 ML
340 PLASTIC BAG, INJECTION (ML) INTRAVENOUS CONTINUOUS PRN
Status: DISCONTINUED | OUTPATIENT
Start: 2023-08-28 | End: 2023-08-29 | Stop reason: HOSPADM

## 2023-08-28 RX ORDER — ONDANSETRON 4 MG/1
4 TABLET, ORALLY DISINTEGRATING ORAL EVERY 6 HOURS PRN
Status: DISCONTINUED | OUTPATIENT
Start: 2023-08-28 | End: 2023-08-28 | Stop reason: HOSPADM

## 2023-08-28 RX ORDER — IBUPROFEN 800 MG/1
800 TABLET, FILM COATED ORAL
Status: DISCONTINUED | OUTPATIENT
Start: 2023-08-28 | End: 2023-08-29 | Stop reason: HOSPADM

## 2023-08-28 RX ORDER — HYDROCORTISONE 25 MG/G
CREAM TOPICAL 3 TIMES DAILY PRN
Status: DISCONTINUED | OUTPATIENT
Start: 2023-08-28 | End: 2023-08-29 | Stop reason: HOSPADM

## 2023-08-28 RX ORDER — NALBUPHINE HYDROCHLORIDE 20 MG/ML
2.5-5 INJECTION, SOLUTION INTRAMUSCULAR; INTRAVENOUS; SUBCUTANEOUS EVERY 6 HOURS PRN
Status: DISCONTINUED | OUTPATIENT
Start: 2023-08-28 | End: 2023-08-29 | Stop reason: HOSPADM

## 2023-08-28 RX ORDER — MISOPROSTOL 200 UG/1
400 TABLET ORAL
Status: DISCONTINUED | OUTPATIENT
Start: 2023-08-28 | End: 2023-08-29 | Stop reason: HOSPADM

## 2023-08-28 RX ORDER — FENTANYL/ROPIVACAINE/NS/PF 2MCG/ML-.1
PLASTIC BAG, INJECTION (ML) EPIDURAL
Status: DISCONTINUED | OUTPATIENT
Start: 2023-08-28 | End: 2023-08-28 | Stop reason: HOSPADM

## 2023-08-28 RX ORDER — NALOXONE HYDROCHLORIDE 0.4 MG/ML
0.2 INJECTION, SOLUTION INTRAMUSCULAR; INTRAVENOUS; SUBCUTANEOUS
Status: DISCONTINUED | OUTPATIENT
Start: 2023-08-28 | End: 2023-08-28 | Stop reason: HOSPADM

## 2023-08-28 RX ORDER — FENTANYL CITRATE-0.9 % NACL/PF 10 MCG/ML
100 PLASTIC BAG, INJECTION (ML) INTRAVENOUS EVERY 5 MIN PRN
Status: DISCONTINUED | OUTPATIENT
Start: 2023-08-28 | End: 2023-08-28 | Stop reason: HOSPADM

## 2023-08-28 RX ORDER — PROCHLORPERAZINE MALEATE 10 MG
10 TABLET ORAL EVERY 6 HOURS PRN
Status: DISCONTINUED | OUTPATIENT
Start: 2023-08-28 | End: 2023-08-28 | Stop reason: HOSPADM

## 2023-08-28 RX ORDER — MODIFIED LANOLIN
OINTMENT (GRAM) TOPICAL
Status: DISCONTINUED | OUTPATIENT
Start: 2023-08-28 | End: 2023-08-29 | Stop reason: HOSPADM

## 2023-08-28 RX ORDER — IBUPROFEN 800 MG/1
800 TABLET, FILM COATED ORAL EVERY 6 HOURS PRN
Status: DISCONTINUED | OUTPATIENT
Start: 2023-08-28 | End: 2023-08-29 | Stop reason: HOSPADM

## 2023-08-28 RX ORDER — SODIUM CHLORIDE, SODIUM LACTATE, POTASSIUM CHLORIDE, CALCIUM CHLORIDE 600; 310; 30; 20 MG/100ML; MG/100ML; MG/100ML; MG/100ML
INJECTION, SOLUTION INTRAVENOUS CONTINUOUS PRN
Status: DISCONTINUED | OUTPATIENT
Start: 2023-08-28 | End: 2023-08-28 | Stop reason: HOSPADM

## 2023-08-28 RX ORDER — OXYTOCIN/0.9 % SODIUM CHLORIDE 30/500 ML
1-24 PLASTIC BAG, INJECTION (ML) INTRAVENOUS CONTINUOUS
Status: DISCONTINUED | OUTPATIENT
Start: 2023-08-28 | End: 2023-08-28 | Stop reason: HOSPADM

## 2023-08-28 RX ORDER — MISOPROSTOL 200 UG/1
800 TABLET ORAL
Status: DISCONTINUED | OUTPATIENT
Start: 2023-08-28 | End: 2023-08-28 | Stop reason: HOSPADM

## 2023-08-28 RX ORDER — DOCUSATE SODIUM 100 MG/1
100 CAPSULE, LIQUID FILLED ORAL DAILY
Status: DISCONTINUED | OUTPATIENT
Start: 2023-08-28 | End: 2023-08-29 | Stop reason: HOSPADM

## 2023-08-28 RX ORDER — CARBOPROST TROMETHAMINE 250 UG/ML
250 INJECTION, SOLUTION INTRAMUSCULAR
Status: DISCONTINUED | OUTPATIENT
Start: 2023-08-28 | End: 2023-08-29 | Stop reason: HOSPADM

## 2023-08-28 RX ORDER — METOCLOPRAMIDE HYDROCHLORIDE 5 MG/ML
10 INJECTION INTRAMUSCULAR; INTRAVENOUS EVERY 6 HOURS PRN
Status: DISCONTINUED | OUTPATIENT
Start: 2023-08-28 | End: 2023-08-28 | Stop reason: HOSPADM

## 2023-08-28 RX ORDER — OXYTOCIN/0.9 % SODIUM CHLORIDE 30/500 ML
340 PLASTIC BAG, INJECTION (ML) INTRAVENOUS CONTINUOUS PRN
Status: DISCONTINUED | OUTPATIENT
Start: 2023-08-28 | End: 2023-08-28 | Stop reason: HOSPADM

## 2023-08-28 RX ORDER — ONDANSETRON 2 MG/ML
4 INJECTION INTRAMUSCULAR; INTRAVENOUS EVERY 6 HOURS PRN
Status: DISCONTINUED | OUTPATIENT
Start: 2023-08-28 | End: 2023-08-28 | Stop reason: HOSPADM

## 2023-08-28 RX ORDER — ACETAMINOPHEN 325 MG/1
650 TABLET ORAL EVERY 4 HOURS PRN
Status: DISCONTINUED | OUTPATIENT
Start: 2023-08-28 | End: 2023-08-29 | Stop reason: HOSPADM

## 2023-08-28 RX ORDER — CITRIC ACID/SODIUM CITRATE 334-500MG
30 SOLUTION, ORAL ORAL
Status: DISCONTINUED | OUTPATIENT
Start: 2023-08-28 | End: 2023-08-28 | Stop reason: HOSPADM

## 2023-08-28 RX ADMIN — IBUPROFEN 800 MG: 800 TABLET, FILM COATED ORAL at 20:34

## 2023-08-28 RX ADMIN — DOCUSATE SODIUM 100 MG: 100 CAPSULE, LIQUID FILLED ORAL at 14:31

## 2023-08-28 RX ADMIN — KETOROLAC TROMETHAMINE 30 MG: 30 INJECTION, SOLUTION INTRAMUSCULAR; INTRAVENOUS at 13:00

## 2023-08-28 RX ADMIN — SODIUM CHLORIDE, POTASSIUM CHLORIDE, SODIUM LACTATE AND CALCIUM CHLORIDE: 600; 310; 30; 20 INJECTION, SOLUTION INTRAVENOUS at 11:38

## 2023-08-28 RX ADMIN — Medication 2 MILLI-UNITS/MIN: at 11:39

## 2023-08-28 ASSESSMENT — ACTIVITIES OF DAILY LIVING (ADL)
WALKING_OR_CLIMBING_STAIRS_DIFFICULTY: NO
DIFFICULTY_COMMUNICATING: NO
ADLS_ACUITY_SCORE: 20
DIFFICULTY_EATING/SWALLOWING: NO
DOING_ERRANDS_INDEPENDENTLY_DIFFICULTY: NO
TOILETING_ISSUES: NO
ADLS_ACUITY_SCORE: 20
WEAR_GLASSES_OR_BLIND: YES
DRESSING/BATHING_DIFFICULTY: NO
HEARING_DIFFICULTY_OR_DEAF: NO
ADLS_ACUITY_SCORE: 20
FALL_HISTORY_WITHIN_LAST_SIX_MONTHS: NO
VISION_MANAGEMENT: GLASSES
CONCENTRATING,_REMEMBERING_OR_MAKING_DECISIONS_DIFFICULTY: NO
ADLS_ACUITY_SCORE: 20

## 2023-08-28 NOTE — TELEPHONE ENCOUNTER
TC: spoke with Cayla who report at 0515 she noticed leaking sensation and went to the bathroom which prompted more small leaking that saturated her underwear liner but not more, voided in the toilet, attempted to return to bed but again noted small leaking, only saturating her underwear liner. She noted pink on the tissue paper, no malodor. She's since been laying down, did rise to take my call and noted no further leaking with 20 minutes between. She reported contractions last night that she was able to fall asleep despite their regular occurrence. No UCs this morning. Reports normal FM. Cayla is  40w4d, GBS is negative. We discussed a monitoring at home period, if noting any consistent small leaking, or if noting at any time the color to the leaking is yellow/green, recommendation to call mel VILLATORO. Cayla does have an appointment today in clinic. Advised to contact mel VILLATORO sooner than that if she is suspicious but clinic visit will be reassuring if no other evidence of leaking than this early morning episode. Agrees with this plan of care. [facility] status.

## 2023-08-28 NOTE — H&P
Labor & Delivery Admission Note:    Date: 2023  Time: 8:47 AM    Admission H&P  Cayla Lerner,  1990, MRN [id number]    Encounter for triage in pregnant patient [Z36.89]  Normal labor [O80, Z37.9]    PCP: Shayy Noriega, [phone]   Code status:  Full Code       Extended Emergency Contact Information  Primary Emergency Contact: YannDavon  Address: [address] [place] [address]  Home Phone: [phone]  Relation: Spouse       Chief Complaint: Normal labor       HPI:      Cayla Lerner, is a 33 year old,  AT 40w4d, LMP 2022, Estimated Date of Delivery: Aug 24, 2023, confirmed by LMP and 20 wk ultraosund, admitted to [facility] on 2023 at 0845 secondary to: onset of contractions @ 0515, SROM @ 0515 meconium stained.     Prenatal History:  Cayla Lerner began care with [facility]-Midwives at the  [facility] on 23 at 12 weeks gestation with regular care thereafter for a total of 11 visits. Her care was complicated by history of Pre eclampsia so she was on aspirin 81 mg daily, more than expected weight gain in pregnancy (TWG 42#).    Pertinent Labs:    Prenatal Office Visit on 2023   Component Date Value Ref Range Status    Hemoglobin 2023 12.9  11.7 - 15.7 g/dL Final   Prenatal Office Visit on 2023   Component Date Value Ref Range Status    Group B Strep PCR 2023 Negative  Negative Final    Presumed negative for Streptococcus agalactiae (Group B Streptococcus) or the number of organisms may be below the limit of detection of the assay.   Lab on 2023   Component Date Value Ref Range Status    Gestational GTT Fasting 2023 80  60 - 94 mg/dL Final    Gestational GTT 1 Hr Post Dose 2023 161  60 - 179 mg/dL Final    Gestational GTT 2 Hr Post Dose 2023 127  60 - 154 mg/dL Final    Gestational GTT 3 Hr Post Dose 2023 101  60 - 139 mg/dL Final   Prenatal  Office Visit on 05/08/2023   Component Date Value Ref Range Status    Glu Gest Screen 1hr 50g 05/08/2023 186 (H)  70 - 129 mg/dL Final    Treponema Antibody Total 05/08/2023 Nonreactive  Nonreactive Final    Hemoglobin 05/08/2023 12.2  11.7 - 15.7 g/dL Final   Prenatal Office Visit on 02/09/2023   Component Date Value Ref Range Status    Culture 02/09/2023 No Growth   Final    See Scanned Result 02/09/2023 INVITAE NON-INVASIVE PRENATAL SCREENING-Scanned   Final    Sodium 02/09/2023 136  136 - 145 mmol/L Final    Potassium 02/09/2023 3.8  3.4 - 5.3 mmol/L Final    Chloride 02/09/2023 102  98 - 107 mmol/L Final    Carbon Dioxide (CO2) 02/09/2023 23  22 - 29 mmol/L Final    Anion Gap 02/09/2023 11  7 - 15 mmol/L Final    Urea Nitrogen 02/09/2023 9.8  6.0 - 20.0 mg/dL Final    Creatinine 02/09/2023 0.54  0.51 - 0.95 mg/dL Final    Calcium 02/09/2023 9.2  8.6 - 10.0 mg/dL Final    Glucose 02/09/2023 82  70 - 99 mg/dL Final    Alkaline Phosphatase 02/09/2023 28 (L)  35 - 104 U/L Final    AST 02/09/2023 18  10 - 35 U/L Final    ALT 02/09/2023 13  10 - 35 U/L Final    Protein Total 02/09/2023 6.3 (L)  6.4 - 8.3 g/dL Final    Albumin 02/09/2023 4.0  3.5 - 5.2 g/dL Final    Bilirubin Total 02/09/2023 0.2  <=1.2 mg/dL Final    GFR Estimate 02/09/2023 >90  >60 mL/min/1.73m2 Final    eGFR calculated using 2021 CKD-EPI equation.    ABO/RH(D) 02/09/2023 A POS   Final    SPECIMEN EXPIRATION DATE 02/09/2023 [id number]   Final    Antibody Screen 02/09/2023 Negative  Negative Final    SPECIMEN EXPIRATION DATE 02/09/2023 [id number]   Final    WBC Count 02/09/2023 5.9  4.0 - 11.0 10e3/uL Final    RBC Count 02/09/2023 4.08  3.80 - 5.20 10e6/uL Final    Hemoglobin 02/09/2023 12.5  11.7 - 15.7 g/dL Final    Hematocrit 02/09/2023 34.9 (L)  35.0 - 47.0 % Final    MCV 02/09/2023 86  78 - 100 fL Final    MCH 02/09/2023 30.6  26.5 - 33.0 pg Final    MCHC 02/09/2023 35.8  31.5 - 36.5 g/dL Final    RDW 02/09/2023 13.3  10.0 - 15.0 % Final     Platelet Count 2023 234  150 - 450 10e3/uL Final    Rubella Viry IgG Instrument Value 2023 1.13  <0.90 Index Final    Rubella Antibody IgG 2023 Positive   Final    Suggests previous exposure or immunization and probable immunity.    Treponema Antibody Total 2023 Nonreactive  Nonreactive Final    Hepatitis B Surface Antigen 2023 Nonreactive  Nonreactive Final    Hepatitis C Antibody 2023 Nonreactive  Nonreactive Final    HIV Antigen Antibody Combo 2023 Nonreactive  Nonreactive Final    HIV-1 p24 Ag & HIV-1/HIV-2 Ab Not Detected       Pertinent Radiology:  Normal fetal survey at 20w1d EFW 56%   Growth ultrasound on 23 EFW 73% cephalic, nl AF      OB History  OB History    Para Term  AB Living   4 3 3 0 0 3   SAB IAB Ectopic Multiple Live Births   0 0 0 0 3      # Outcome Date GA Lbr Sixto/2nd Weight Sex Delivery Anes PTL Lv   4 Current            3 Term 21 40w3d 04:13 / 00:07 3.941 kg (8 lb 11 oz) M Vag-Spont None N TERRY      Complications: Dysfunctional Labor      Name: Ahmet      Apgar1: 9  Apgar5: 9   2 Term 19 39w6d 01:24 / 00:07 3 kg (6 lb 9.8 oz) F Vag-Spont None N TERRY      Name: Florina      Apgar1: 9  Apgar5: 9   1 Term 17 40w1d  3.345 kg (7 lb 6 oz) F Vag-Spont Nitrous  ETRRY      Birth Comments: 20 min 2nd stage      Name: Sonya      Apgar1: 9  Apgar5: 9       Medical History     Surgical History  She  has a past surgical history that includes [employer] [place] Tooth Extraction.       Social History  Reviewed, and she  reports that she has never smoked. She has never used smokeless tobacco. She reports that she does not currently use alcohol after a past usage of about 1.0 standard drink of alcohol per week. She reports that she does not use drugs.        Family History  Reviewed, and family history includes Alcoholism in her father, maternal grandfather, maternal uncle, paternal aunt, paternal grandfather, and paternal uncle; Arthritis in  "her maternal grandfather; Bipolar Disorder in her brother; Breast Cancer (age of onset: 60) in her maternal grandmother; Depression in her brother, maternal aunt, maternal grandmother, maternal uncle, and mother; Diabetes Type 1 in her paternal uncle; Hearing Loss in her maternal grandfather; Hypertension in her paternal grandfather; Mental Illness in her father; No Known Problems in her brother, sister, and sister; Vision Loss in her paternal grandmother.   Psychosocial Needs  Social History     Social History Narrative    Not on file     Additional psychosocial needs reviewed per nursing assessment.       No Known Allergies   Medications Prior to Admission   Medication Sig Dispense Refill Last Dose    aspirin 81 MG EC tablet Take 81 mg by mouth daily   8/27/2023    Prenatal Vit-Fe Fumarate-FA (PRENATAL MULTIVITAMIN W/IRON) 27-0.8 MG tablet    8/27/2023        Review of Systems:  Normal except as noted in HPI   Physical Exam:  General appearance: WDL  PsychA&O  Cardiovascular RRR,  GI/Abdomen nontender, gravid uterus  Respiratory clear bilat   Extremities [facility]      /84 (BP Location: Right arm, Patient Position: Semi-Dawson's, Cuff Size: Adult Regular)   Pulse 82   Temp 98.2  F (36.8  C)   Resp 19   Ht 1.676 m (5' 6\")   Wt 73.5 kg (162 lb)   LMP 11/17/2022 (Exact Date)   BMI 26.15 kg/m          Membrane Status:  SROM at 0515     Fluid color/ meconium           Cervical Exam:  4cm   80%   0              Fetal Heart Rate:    Fetus A:  baseline 150, moderate variability with accels present, no decels              Uterine Activity:  irreg                         Notable AP/IP factors to date:  1.)multip with SROM in early labor  2.)excessive weight gain in pregnancy TWG 42lb  3.)meconium stained fluid  4.)hx of pre eclampsia    Impression:  Cayla Lerner is a 33 year old year old at 40w4d weeks SROM without active labor.      Plan:  Cervical exam with membrane sweep  Expectant management and reassess in " 1-2 hours, offered IV Pitocin augmentation and pt defers at this time. Willing to discus augmentation in a few hours if still no active labor  Plan to have SCN team at birth due to meconium    Provider: Larissa Guerrero DNP,APRN,CNM   Total time: 30 min at bedside and in the [facility] obtaining and clarifying the above history, performing the physical exam, providing patient education and counseling, coordinating and developing this plan of care.  Education, counseling and coordination of care time: >50%.    Date: 2023  Time: 8:47 AM    Cayla Lerner,  1990, MRN [id number]

## 2023-08-28 NOTE — CONFIDENTIAL NOTE
Cayla is a 33 yr old  at 40w4d gestation who calls with concerns about leaking of fluid since earlier this morning. Clear to yellowish tinged, some mucous and blood seen. Baby active. Some contractions, getting more regular. Advised to come to [facility] for eval this morning.

## 2023-08-28 NOTE — PLAN OF CARE
Problem: Plan of Care - These are the overarching goals to be used throughout the patient stay.    Goal: Optimal Comfort and Wellbeing  Intervention: Provide Person-Centered Care  Recent Flowsheet Documentation  Taken 8/28/2023 1531 by Mickey Deluna RN  Trust Relationship/Rapport:   care explained   choices provided   emotional support provided     Problem: Postpartum (Vaginal Delivery)  Goal: Successful Maternal Role Transition  Outcome: Progressing     Problem: Postpartum (Vaginal Delivery)  Goal: Effective Urinary Elimination  Outcome: Progressing          Patient vital signs stable. Patient able to ambulate independently and denies pain. Patient able to void.

## 2023-08-28 NOTE — L&D DELIVERY NOTE
OB Vaginal Delivery Note    aCyla Lerner MRN# [id number]   Age: 33 year old YOB: 1990       GA: 40w4d  GP:   Labor Complications: None   EBL:   mL  Delivery QBL: 100 mL  Delivery Type: Vaginal, Spontaneous   ROM to Delivery Time: (Delivered) Hours: 7 Minutes: 36   Weight:     1 Minute 5 Minute 10 Minute   Apgar Totals: 8   9        ABDON DU;MADDY ANTOINE;GINGER BARNES     Delivery Details:  Cayla Lerner, a 33 year old  female delivered a viable infant with apgars of 8  and 9 . Patient was fully dilated and pushing after   51   minutes in active labor. Delivery was via vaginal, spontaneous  to a sterile field under none  anesthesia. Infant delivered in vertex  left  occiput  anterior  position. Anterior and posterior shoulders delivered without difficulty. The cord was clamped, cut twice and 3 vessels  were noted. Cord blood was obtained in routine fashion with the following disposition: discard .      Cord complications: nuchal   Placenta delivered at 2023 12:56 PM . Placental disposition was Hospital disposal . Fundal massage performed and fundus found to be firm.     Episiotomy: none    Perineum, vagina, cervix were inspected, and the following lacerations were noted:   Perineal lacerations: none              Excellent hemostasis was noted. Needle count correct. Infant and patient in delivery room in good and stable condition.        Yann Female-Cayla [[id number]]      Labor Event Times      Latent labor onset date/time: 2023 1200    Start pushing date/time: 2023 1248          Labor Events     labor?: No   steroids: None  Labor Type: Spontaneous  Predominate monitoring during 1st stage: continuous electronic fetal monitoring     Antibiotics received during labor?: No       Rupture date/time: 23 0515   Fluid color: Meconium     Indications for augmentation: Ineffective Contraction Pattern       Delivery/Placenta Date  and Time      Delivery Date: 23 Delivery Time: 12:51 PM   Placenta Date/Time: 2023 12:56 PM  Oxytocin given at the time of delivery: after delivery of baby  Delivering clinician: Deepa Guerrero CNM   Other personnel present at delivery:  Provider Role   Mickey Deluna RN Registered Nurse   Sanjana Callejas RN Registered Nurse   Lachelle Pedraza RN Registered Nurse             Vaginal Counts       Initial count performed by 2 team members:  Two Team Members   deepa Deluna RN         Needles Suture Needles Sponges (RETIRED) Instruments   Initial counts 0 0 0    Added to count 0 0 0    Relief counts       Final counts 0 0 0            Placed during labor Accounted for at the end of labor   FSE No NA   IUPC No NA   Cervidil No NA                  Final count performed by 2 team members:  Two Team Members   deepa Deluna RN      Final count correct?: Yes  Pre-Birth Team Brief: Complete  Post-Birth Team Debrief: Complete       Apgars    Living status: Living   1 Minute 5 Minute 10 Minute 15 Minute 20 Minute   Skin color: 0  1       Heart rate: 2  2       Reflex irritability: 2  2       Muscle tone: 2  2       Respiratory effort: 2  2       Total: 8  9       Apgars assigned by: CARSON MONIQUE       Cord      Vessels: 3 Vessels    Cord Complications: Nuchal   Nuchal Intervention: reduced         Nuchal cord description: loose nuchal cord         Cord Blood Disposition: Discard    Gases Sent?: No    Delayed cord clamping?: Yes    Cord Clamping Delay (seconds):  seconds    Stem cell collection?: No            Resuscitation    Methods: None  Output in Delivery Room: Stool        Measurements    Output in delivery room: Stool       Skin to Skin and Feeding Plan      Skin to skin initiation date/time: 1841    Skin to skin with: Mother  Skin to skin end date/time:            Delivery (Maternal) (Provider to Complete) ([id number])    Episiotomy: None  Perineal  lacerations: None    Repair suture: None  Genital tract inspection done: Pos       Blood Loss  Mother: Cayla Lerner #[id number]     Start of Mother's Information      Delivery Blood Loss  08/28/23 0051 - 08/28/23 1332      Delivery QBL (mL) Hospital Encounter 100 mL    Total  100 mL               End of Mother's Information  Mother: Cayla Lerner #[id number]                Delivery - Provider to Complete ([id number])    Delivering clinician: Larissa Guerrero CNM  Delivery Type (Choose the 1 that will go to the Birth History): Vaginal, Spontaneous                         Other personnel:  Provider Role   Mickey Deluna, RN Registered Nurse   Sanjana Callejas RN Registered Nurse   Lachelle Pedraza RN Registered Nurse                    Placenta    Date/Time: 8/28/2023 12:56 PM  Removal: Spontaneous  Disposition: Hospital disposal             Anesthesia    Method: None                    Presentation and Position    Presentation: Vertex    Position: Left Occiput Anterior                   Larissa Guerrero DNP,APRN,SHAUNA

## 2023-08-28 NOTE — PROGRESS NOTES
Pt requesting epidural. Pitocin at 2 mu/min, contractions closet together and more intense. Pause Pitocin for now until epidural in place then restart prn.

## 2023-08-28 NOTE — PROGRESS NOTES
"Labor Progress Note:    Patient Name:  Cayla Lerner  :      1990  MRN:      [id number]    Assessment:    , at 40w 4d   Early labor dysfunctional labor pattern   SROM of meconium fluid 0515    Plan:   -IV Pitocin per protocol  -Routine CNM support & management. Encourage position changes, ambulation, rest as desired.  -Anticipate progress and NSVB.   -Reevaluate progress in 2-3 hours or sooner with a change in status.    Subjective:  Cayla Lerner is coping well with contractions. Up laboring on birth ball, still fairly comfortable. Requests cervical exam first before deciding if OK with augmentation. Good PO fluid intake.   Voiding without issue.     Objective:  /80   Pulse 82   Temp 98.2  F (36.8  C) (Oral)   Resp 19   Ht 1.676 m (5' 6\")   Wt 73.5 kg (162 lb)   LMP 2022 (Exact Date)   SpO2 99%   BMI 26.15 kg/m    FHR: category I tracing  Uterine contractions: Q4-7 min  SVE: unchanged from earlier exam. 4cm/80%/0      TT with patient 15mn >50% time spent in counseling    Provider:  Larissa Guerrero DNP,NELDA,CNM    Date:  2023  Time:  11:21 AM    "

## 2023-08-29 VITALS
RESPIRATION RATE: 16 BRPM | HEART RATE: 77 BPM | DIASTOLIC BLOOD PRESSURE: 82 MMHG | OXYGEN SATURATION: 97 % | BODY MASS INDEX: 26.03 KG/M2 | TEMPERATURE: 98.2 F | SYSTOLIC BLOOD PRESSURE: 119 MMHG | WEIGHT: 162 LBS | HEIGHT: 66 IN

## 2023-08-29 PROCEDURE — 250N000013 HC RX MED GY IP 250 OP 250 PS 637: Performed by: MIDWIFE

## 2023-08-29 RX ORDER — MODIFIED LANOLIN
OINTMENT (GRAM) TOPICAL
COMMUNITY
Start: 2023-08-29 | End: 2023-10-09

## 2023-08-29 RX ORDER — ACETAMINOPHEN 325 MG/1
650 TABLET ORAL EVERY 4 HOURS PRN
COMMUNITY
Start: 2023-08-29 | End: 2023-10-09

## 2023-08-29 RX ORDER — DOCUSATE SODIUM 100 MG/1
100 CAPSULE, LIQUID FILLED ORAL DAILY
COMMUNITY
Start: 2023-08-30 | End: 2023-10-09

## 2023-08-29 RX ORDER — IBUPROFEN 800 MG/1
800 TABLET, FILM COATED ORAL EVERY 6 HOURS PRN
COMMUNITY
Start: 2023-08-29 | End: 2023-10-09

## 2023-08-29 RX ADMIN — IBUPROFEN 800 MG: 800 TABLET, FILM COATED ORAL at 06:53

## 2023-08-29 RX ADMIN — ACETAMINOPHEN 650 MG: 325 TABLET ORAL at 00:34

## 2023-08-29 RX ADMIN — DOCUSATE SODIUM 100 MG: 100 CAPSULE, LIQUID FILLED ORAL at 09:14

## 2023-08-29 RX ADMIN — ACETAMINOPHEN 650 MG: 325 TABLET ORAL at 09:14

## 2023-08-29 RX ADMIN — IBUPROFEN 800 MG: 800 TABLET, FILM COATED ORAL at 13:37

## 2023-08-29 ASSESSMENT — ACTIVITIES OF DAILY LIVING (ADL)
ADLS_ACUITY_SCORE: 20

## 2023-08-29 NOTE — DISCHARGE SUMMARY
Pt vital signs stable. Pain managed with ibuprofen. Discharge instructions given and questions answered. Pt states she's ready for discharge.

## 2023-08-29 NOTE — DISCHARGE INSTRUCTIONS
Warning Signs after Having a Baby    Keep this paper on your fridge or somewhere else where you can see it.    Call your provider if you have any of these symptoms up to 12 weeks after having your baby.    Thoughts of hurting yourself or your baby  Pain in your chest or trouble breathing  Severe headache not helped by pain medicine  Eyesight concerns (blurry vision, seeing spots or flashes of light, other changes to eyesight)  Fainting, shaking or other signs of a seizure    Call 9-1-1 if you feel that it is an emergency.     The symptoms below can happen to anyone after giving birth. They can be very serious. Call your provider if you have any of these warning signs.    My provider s phone number: _______________________    Losing too much blood (hemorrhage)    Call your provider if you soak through a pad in less than an hour or pass blood clots bigger than a golf ball. These may be signs that you are bleeding too much.    Blood clots in the legs or lungs    After you give birth, your body naturally clots its blood to help prevent blood loss. Sometimes this increased clotting can happen in other areas of the body, like the legs or lungs. This can block your blood flow and be very dangerous.     Call your provider if you:  Have a red, swollen spot on the back of your leg that is warm or painful when you touch it.   Are coughing up blood.     Infection    Call your provider if you have any of these symptoms:  Fever of 100.4 F (38 C) or higher.  Pain or redness around your stitches if you had an incision.   Any yellow, white, or green fluid coming from places where you had stitches or surgery.    Mood Problems (postpartum depression)    Many people feel sad or have mood changes after having a baby. But for some people, these mood swings are worse.     Call your provider right away if you feel so anxious or nervous that you can't care for yourself or your baby.    Preeclampsia (high blood pressure)    Even if you  didn't have high blood pressure when you were pregnant, you are at risk for the high blood pressure disease called preeclampsia. This risk can last up to 12 weeks after giving birth.     Call your provider if you have:   Pain on your right side under your rib cage  Sudden swelling in the hands and face    Remember: You know your body. If something doesn't feel right, get medical help.     For informational purposes only. Not to replace the advice of your health care provider. Copyright 2020 [facility]. All rights reserved. Clinically reviewed by Jyoti Jones, RNC-OB, MSN. trippiece [id number] - Rev 02/23.

## 2023-08-29 NOTE — PROGRESS NOTES
Pt c/o uterine pain, given tylenol and ibuprofen with improvement. Ambulating and voiding independently. Fundus firm, lochia appropriate. Breastfeeding, sore/tender bilateral nipples. No open sores noted. Using nipple cream and hydrogel patches.   Educated on not letting baby sleep in the bed with her while in the hospital, and safe sleep practices. Pt agreeable.

## 2023-08-29 NOTE — PROGRESS NOTES
Outreach Note for [facility]    Cayla Lerner  [id number]  1990    Discharge follow-up plan discussed with patient, needs assessed. Pt requests all follow-up through clinic/physician, declines home care visit, unless medically indicated and ordered by physician, and declines follow-up phone call.   No further needs identified at this time.

## 2023-08-29 NOTE — DISCHARGE SUMMARY
"Vaginal Delivery Postpartum Day 1 and Discharge      Patient Name:  Cayla Lerner  :      1990  MRN:      [id number]      Subjective:  Cayla is doing well.  Pleased with her care. Shares that the couple are \"so happy with the care of the midwives through all of their pregnancies/births\".  Baby \"Julita\" (spelling?) doing well. Voiding and ambulating without difficulty. Tolerating a normal diet. Had a BM.  Bleeding is decreasing.  Pain is well controlled.  Baby is feeding on cue. Is breastfeeding without the assistance of the nursing staff. Postpartum contraception plans include IUD and vasectomy.  The couple are sure that their family is complete now. Support at home identified spouse Davon, and mother-in-law (lives with them).  Both Cayla and Davon will be off work X12 weeks.  Has no history of depression/anxiety/mental health disorder.     Objective:  /82 (BP Location: Left arm)   Pulse 77   Temp 98.2  F (36.8  C) (Oral)   Resp 16   Ht 1.676 m (5' 6\")   Wt 73.5 kg (162 lb)   LMP 2022 (Exact Date)   SpO2 97%   Breastfeeding Unknown   BMI 26.15 kg/m      General:  Pleasant, articulate, well-nourished female.  Not in any apparent distress.  Breasts:  Breasts soft, non-tender, nipples mildly tender  Abdomen:  Soft, non-tender.  Fundus firm @ U -2, non-tender, midline.  Bleeding:  Minimal rubra lochia, without clots or odor.  Vulva: No laceration well approximated.  No edema.  No bruising.  Rectal:  No hemorrhoids.  Legs:  No edema.  No varicosities.  Negative kiko's sign.        Assessment:   -Postpartum Day 1, vaginal birth  -Breastfeeding  -Stable postpartum course      Plan:    -New mom handout information reviewed.  Discussed khris care, breast care, pain management, breastfeeding initiation, bowel changes, return of fertility, postpartum exercises, postpartum mood changes and adjustments, postpartum \"baby-blues\" vs.  mood and anxiety disorders, sleep changes, required support. "     -Plan for today: continue self care and baby care, and will be discharging to home.       -Discharging to home today.  Will be accompanied by her partner.        Provider:  EHRB Verdin    Date:  8/29/2023  Time:  9:44 AM

## 2023-09-27 ENCOUNTER — MEDICAL CORRESPONDENCE (OUTPATIENT)
Dept: HEALTH INFORMATION MANAGEMENT | Facility: CLINIC | Age: 33
End: 2023-09-27
Payer: COMMERCIAL

## 2023-10-08 ASSESSMENT — EDINBURGH POSTNATAL DEPRESSION SCALE (EPDS)
I HAVE BEEN ABLE TO LAUGH AND SEE THE FUNNY SIDE OF THINGS: AS MUCH AS I ALWAYS COULD
THINGS HAVE BEEN GETTING ON TOP OF ME: NO, I HAVE BEEN COPING AS WELL AS EVER
I HAVE BEEN ANXIOUS OR WORRIED FOR NO GOOD REASON: NO, NOT AT ALL
I HAVE LOOKED FORWARD WITH ENJOYMENT TO THINGS: AS MUCH AS I EVER DID
I HAVE FELT SAD OR MISERABLE: NO, NOT AT ALL
I HAVE BLAMED MYSELF UNNECESSARILY WHEN THINGS WENT WRONG: NO, NEVER
I HAVE FELT SCARED OR PANICKY FOR NO GOOD REASON: NO, NOT MUCH
THE THOUGHT OF HARMING MYSELF HAS OCCURRED TO ME: NEVER
I HAVE BEEN SO UNHAPPY THAT I HAVE HAD DIFFICULTY SLEEPING: NOT AT ALL
I HAVE BEEN SO UNHAPPY THAT I HAVE BEEN CRYING: NO, NEVER
TOTAL SCORE: 1

## 2023-10-09 ENCOUNTER — PRENATAL OFFICE VISIT (OUTPATIENT)
Dept: MIDWIFE SERVICES | Facility: CLINIC | Age: 33
End: 2023-10-09
Payer: COMMERCIAL

## 2023-10-09 VITALS
SYSTOLIC BLOOD PRESSURE: 108 MMHG | WEIGHT: 146 LBS | DIASTOLIC BLOOD PRESSURE: 62 MMHG | BODY MASS INDEX: 23.46 KG/M2 | HEIGHT: 66 IN

## 2023-10-09 DIAGNOSIS — Z01.812 PRE-PROCEDURE LAB EXAM: ICD-10-CM

## 2023-10-09 DIAGNOSIS — Z30.430 ENCOUNTER FOR IUD INSERTION: ICD-10-CM

## 2023-10-09 DIAGNOSIS — Z30.430 ENCOUNTER FOR INSERTION OF INTRAUTERINE CONTRACEPTIVE DEVICE: ICD-10-CM

## 2023-10-09 PROBLEM — Z36.89 ENCOUNTER FOR TRIAGE IN PREGNANT PATIENT: Status: RESOLVED | Noted: 2023-08-28 | Resolved: 2023-10-09

## 2023-10-09 PROBLEM — Z37.9 NORMAL LABOR: Status: RESOLVED | Noted: 2023-08-28 | Resolved: 2023-10-09

## 2023-10-09 PROBLEM — O26.02 EXCESSIVE WEIGHT GAIN DURING PREGNANCY IN SECOND TRIMESTER: Status: RESOLVED | Noted: 2023-04-06 | Resolved: 2023-10-09

## 2023-10-09 PROBLEM — Z13.79 GENETIC SCREENING: Status: RESOLVED | Noted: 2023-02-10 | Resolved: 2023-10-09

## 2023-10-09 PROBLEM — Z34.80 SUPERVISION OF OTHER NORMAL PREGNANCY, ANTEPARTUM: Status: RESOLVED | Noted: 2023-02-10 | Resolved: 2023-10-09

## 2023-10-09 LAB — HCG UR QL: NEGATIVE

## 2023-10-09 PROCEDURE — 99207 PR POST PARTUM EXAM: CPT | Performed by: ADVANCED PRACTICE MIDWIFE

## 2023-10-09 PROCEDURE — 81025 URINE PREGNANCY TEST: CPT | Performed by: ADVANCED PRACTICE MIDWIFE

## 2023-10-09 PROCEDURE — 58300 INSERT INTRAUTERINE DEVICE: CPT | Performed by: ADVANCED PRACTICE MIDWIFE

## 2023-10-09 NOTE — PATIENT INSTRUCTIONS
"\"We hope you had a positive experience and that you can definitely recommend [facility] to your family and friends. You ll be receiving a survey soon, and we look forward to hearing your feedback\".    POSTPARTUM INFORMATION AND RESOURCES:     Congratulations on the birth of your baby. We have gathered together some information on staying healthy in the postpartum time including information on exercise, nutrition and mental health. We have also listed some local and national resources for lactation support and mental health support.     If you have questions or concerns and need to speak with a midwife immediately, please call the on-call midwife at [phone].     If you have a non-emergent question or would like to schedule a follow up appointment, please call the clinic midwife at [phone].     Thank you for sharing your birth experience with the [facility].  Congratulations on the birth of your baby!     ---------------------------------------------------------------------------------------------------------  EXERCISE & NUTRITION:      Getting and Staying Active: A Healthy You!   -Why should I exercise? Exercise, being physically active, is very important for all women. Being active can help you:   Lose or maintain weight   Have more energy   Sleep better   Enjoy sex more   Relieve stress and think better   Lower the chance you will have heart disease, high blood pressure, and diabetes   Strengthen your bones and muscles   Have fewer hot flashes if you are older   -How active do I have to be to get the bene?ts of exercise?  Studies show that as little as 15 minutes of moderate exercise--like fast walking or dancing--3 times a week can improve the health of your heart. Ifyou want to get the best benefits from exercise, try to increase your physical activity to at least 30 minutes, 5 times a week. If you have a serious health problem,be sure to talk " with your health care provider before starting an exercise program.   Https://Kona DataSearch/  Http://www.Bridestory.com/     @[email or web address]  @VivienlvsengFlChester  @The.Vagina.Venkata     Taking Care of your Health: Health Care Maintenance Screening recommendations   In all the things women do, taking care of everything and everybody else, they sometimes forget to take care of themselves. This handout reviews the health screenings and vaccines that are recommended for women of all ages. Talk with yourhealth care provider about which tests and vaccines you need. The chart below lists the screening tests and vaccinations recommended for healthy women who do not have a high risk for most diseases.   The recommendations in thischart are guidelines only. For some tests and vaccines, the chart says you should talk to your health care provider.This is because the best recommendations for you depend on your personal health history. Your health care provider may suggest more frequent testing or additional tests ifyou havea higher chance of getting some diseases      Planning Your Family: Developing a Reproductive Life Plan   Planning ahead can help you avoid getting pregnant when you don t want to be pregnant and also be in good health if and when you do decide to become pregnant. Many women have at least one pregnancy in their lives, even if it was not planned. In fact, about half of all pregnancies are not planned. Getting pregnant when you did not plan it can be a problem, or it can turn into a happy event. Planning pregnancy leads to healthier pregnancies, healthier mothers, and healthier families.   Although many women want to have a family, not everyone wants to have children. More and more women are childless by choice (also known as childfree). Whether to have children is a personal choice that only you can make. It s okay not to want children! If you never want to get pregnant, it is important to make sure you  "always use very effective birth control, such as an intrauterine device, the birth control implant, female sterilization (having your tubes tied), or your partner having a vasectomy.      Reliable resources:   ?   American College of Nurse-Midwives (ACNM) http://www.midwife.org/; look at the informational handouts at http://www.midwife.org/Share-With-Women   ??   Women's Health.gov:   http://www.womenshealth.gov/a-z-topics/index.html   FDA - Nutrition ?www.mypyramid.gov? Under \"For Consumers,\" click on \"pregnant and breastfeeding women.\"   ?   Vaccines : Centers for Disease Control and Prevention (CDC) http://www.cdc.gov/vaccines/   ?   [facility] www.Trueffect.GoFish   ?   When researching information on the web, question the validity of websites.? The domains .gov, OopsLab and.org tend to be more reliable information.? If there are a lot of advertisements, be cautious of the information provided. Stay away from blogs and chat rooms please!   ?   ?   Nutrition and supplements:   Daily multivitamin vitamin (with 400 - 1000 mcg folic acid).? Take with food as needed.   ?   4-5 servings of dairy or other calcium rich foods (fish, leafy greens, soy)?per day - if not, take 500-1000 mg additional calcium (Tums, pills, chews). Take with dairy.   ?   Vitamin D3 4000 IU geltab daily. Vitamin D rich foods: Cod Liver Oil (1Tbsp), Duvall, Mackerel, Tuna, fortified milk and yogurt, Beef and liver, sardines, egg, fortified cereals, swiss cheese.? Take supplements with fattiest meal.?   ?   2-3 (4) oz servings of fish, seafood, nuts (walnuts & almonds), oils, avocado per week - if not, take Omega 3 Fatty acids: DHA & ELIZABETH 4402-4170 mg per day. ?Other names: cod liver oil, fish oil. Take with fattiest meal.   ?   ?---------------------------------------------------------------------------------------------------------  POSTPARTUM & LACTATION RESOURCES :         Early Childhood Family Education [place] [address] provides a free, drop-in " "class/breastfeeding support group, facilitated by a lactation consultant and .  During the group you can connect with other parents, weigh your baby, ask questions about feeding and baby development, and more.  You do not need to register.    in-person meetings will begin on , are for parents of babies from birth to 9 months, and will meet on Monday evenings from 6 - 7:15 pm in [facility], which is at [address] [facility].  [place] [address] also offers virtual group meetings with a lactation consultant/.  These take place on , from 11:30 am - 12:30 pm for parents of newborns to 3-month-olds, and from 4:15 - 5:15 for parents of 3 - 9 - month olds.  To get the meeting link contact Tamy Curry at [phone].    [facility] offers a free, drop-in breastfeeding support group facilitated by JUANITA Porter.   at [facility] [address], [place].  A scale is available to check baby weights, if desired.  [facility] also has a variety of new parent classes:  (cost for registration)  https://Starriser/classes/    [facility] Lactation [facility] facilitated by JUANITA Hernandez:  Free, drop-in support group for breastfeeding, with baby scale available if needed.  Meets at [facility], second Tuesday of each month, 10 am - 12 pm.  Text [phone] for info.    Lat Cafe Support Group,  at 10:30 am   Run by JUANITA Bhardwaj of [employer] [facility]act[facility]   Go to [facility] Facebook page, click on \"events\" for link:   Https://www.facebook.com/events/535330010002855/    [facility] breastfeeding support community:  free, drop-in breastfeeding support facilitated by Certified Lactation Counselors, open  and  from 1 pm - 5 pm.  In [place]:  Guiding [facility], [address] [facility]:   guidingarLakeHealth TriPoint Medical Centeryamil.[facility]    Health " [facility] Milk Hour, Thursdays at 2:30 pm    Run by JUANITA Jones  Go to [facility] FB page and send message to get link   Https://www.Redux Technologies.Monsoon Commerce/healthapta.meundations/    Aarti Valle:  http://www.Clinical Innovationsas.org/    [employer] offers a Lactation Lounge every Friday 12pm - 1pm, run by Aarti Cazares Leader.  Sign up via link at EcoSwarm/cbe-lactation   https://www.EcoSwarm/cbe-lactation    [facility] is offering virtual support groups every Monday, 10:30 am - 12 pm, run by RN IBCLC: Https://www.Redux Technologies.com/events/969620232181710/    Culturally-Specific Breastfeeding Support:     For Hmong Families:   The Hmong Breastfeeding Coalition is a wonderful support for [place] Hmong women who are breastfeeding.  They are best found on Facebook.    for Black families:    Chocolate Milk Club:  http://www.Songbird/chocolate-milk-club/  Email: Shannon@Vettery    R.O.S.E. = Reaching our Sisters Everywhere  Http://www.breastfeedingrose.org/    Club Mom breastfeeding support for Black mothers:  Contact Niraj Haley  Phone: [phone]   Email:  [email or web address].     Jacqueline Jarrett  Phone: [phone]   Email:  [email or web address].    Club Dad parent support for Black fathers:   Contact Augie Ambriz   Phone: [phone]   Email:  [email or web address].    For Native/Indigenous Families:    https://www.Redux Technologies.com/groups/jairo.gimashkikinaan        OUTPATIENT LACTATION RESOURCES   -Schedule an appointment with a [facility] midwife who is also a Lactation Consultant by calling [phone].  Visits on Tuesdays, Wednesdays and Thursdays at multiple locations.  Call: [phone].       - Information on medication use while breastfeeding: http://toxnet.nlm.nih.gov/newtoxnet/lactmed.htm      Other Online Breastfeeding Resources:   BreastfeedingmadesOSOYOU.comleRestorsea Holdingscom  "  Swetali.org (La Leche League)   Normalfed.com   Womenshealth.gov/breastfeeding   Workandpump.com   Unicon.DineGasm  https://SoloHealth/abcs/   Click on \"Learn More About Attachment\"     -New Parent Connection Drop-In:  In collaboration with Alysha, Early Childhood Family Education (ECFE), a program of [address], offers ongoing classes for new parents in their infants.  The connection classes offer support and resources to parents during the exciting and challenging period of transition following the birth of her baby.  Parents and babies (birth to 6 months of age) may join the group at any time.  Baby's birth to 3 months meet , from 1230 to 1:30 PM  Babies 3-6 months meet , from 4 to 5 PM     -AMS VariCode Mama Group: www.Truminim/free-new-mama-group  -Attend Miiix New Cashplay.coa. Groups located at three locations:  [facility] and [place]. Sign up online.         Additional Resources:?   -American College of Nurse-Midwives (ACNM) http://www.midwife.org/; look at the informational handouts at http://www.midwife.org/Share-With-Women  www.mymidwife.org   ?   -Women's Health.gov:   http://www.womenshealth.gov/a-z-topics/index.html   ?   -Early Childhood and Family Education (ECFE):   ECFE offers parents hands-on learning experiences that will nourish a lifetime of teachable moments.   http://ecfe.info/ecfe-home/         -----------------------------------------------------------------------------------------------------------   (Before, during and after pregnancy)   MOOD DISORDER RESOURCES :  Are you feeling sad or depressed?   Do you feel more irritable or angry with those around you?   Are you having difficulty bonding with your baby?   Do you feel anxious or panicky?   Are you having problems with eating or sleeping?   Are you having upsetting thoughts that you can t get out of your mind?   Do you feel as if you are  out of control  or "  going crazy ?   Do you feel like you never should have become a mother?   Are you worried that you might hurt your baby or yourself?     Any of these symptoms, and many more, could indicate that you have a form of  mood or anxiety disorder, such as postpartum depression. While many women experience some mild mood changes during or after the birth of a child, 15 to 20% of women experience more significant symptoms of depression or anxiety. Please know that with informed care you can prevent a worsening of these symptoms and can fully recover. There is no reason to continue to suffer.  Women of every culture, age, income level and race can develop  mood and anxiety disorders. Symptoms can appear any time during pregnancy and the first 12 months after childbirth. There are effective and well-researched treatment options to help you recover. Although the term  postpartum depression  is most often used, there are actually several forms of illness that women may experience.     Resources:     -Pregnancy and Postpartum Support [place]: www.[facility].org  Who We Are: We are a group of mental health &  practitioners, service organizations, and mother volunteers who provides services to those struggling with a pregnancy, loss, or postpartum mood disorder through the Helpline, professional training, our resource list and website.  What We Do: We provide support, advocacy, awareness, and training about  mental health in [place].      Community Resource List:   This is a list of  resources within our community.   http://[facility] Truman Medical Centerupportmn.org/communityresourcelist    PSYCHOTHERAPISTS/CONSULTANTS   This is a list of licensed mental health professionals who have advanced clinical skills in the treatment of postpartum mood and anxiety disorders (PMADs).   Http://ppsupportmn.org/mentalhealthproviderresourcelist   INTEGRATIVE MEDICINE PRACTITIONERS:   This is a list of licensed  "providers who practice Integrative Medicine: a form of treatment that combines alternative medicine with evidence based medicine in an effort to treat the  whole person  (the person, not just the disease).   http://[facility].org/integrativemedicineproviders    PSYCHIATRIC CARE   This is a list of licensed Psychiatrists who have advanced clinical skills in the treatment and medication management for PMADs and lactating mothers.   Http://[facility].org/psychiatryproviderresroucelist   Click on the links below to find detailed profiles and contact information of providers who have been screened and approved as having advanced training in the area of PMADs. Please note: [facility] does not endorse a specific provider.   The list is in alphabetical order by city. You can also search for providers by city or [facility] code using the search box. Please click on the \"Show\" box to the upper right to advance to the next page. You may also call our Helpline at [phone] if you would like for our Helpline volunteer to find a provider for you.     -Postpartum Depression Support Group:   Weekly groups at no cost through [facility], , 1:30-3:00 p.m., at [facility], [address], [address]. [place], , 1:30-3:00 p.m., at [facility], [address] To register for the group or get more information, call [phone].   -Postpartum Depression Support Group:   Outpatient Intensive Treatment program for women with  mood disorders [facility]     -[facility]  Resource Guide      Women s Mental Health at [facility]  This website provides a range of current information including discussion of new research findings in women s mental health and how such investigations inform day-to-day clinical practice. Despite the growing number of studies being conducted in " women s health, the clinical implications of such work are frequently controversial, leaving patients with questions regarding the most appropriate path to follow. Providing these resources to patients and their doctors so that individual clinical decisions can be made in a thoughtful and collaborative fashion dovetails with the mission of our Center.     https://womensmentalhealth.org/        If you re having thoughts of harming yourself or your baby, it is vital to get support immediately. Call 911 or go to the nearest E.R.     TOLL-FREE / NATIONWIDE EMERGENCY ASSISTANCE   Immediate Emergency:  988  National Suicide Prevention Lifeline:   5-882-010-3395   Suicide Prevention Hotline:   4-102-FWLNJHD   National Postpartum Depression Hotline:   1-843-PPD-MOMS   Postpartum Support International (PSI)   PPD Helpline: (not a 24-hour hotline)   1-366.339.6210    IUD information:     Benefits: The IUD can be 97-99% effective when carefully following directions regarding use. It can be more effective if used with additional contraception. IUD containing progestin may decrease menstrual flow and menstrual cramping.     Risks/Side Effects: include but are not limited to spotting, bleeding, hemorrhage, or anemia: cramping or pain: partial or complete expulsion of device; lost IUD strings; uterine or cervical perforation; embedding of IUD in the uterine wall; increased risk of pelvic inflammatory disease. Women who become pregnant with an IUD in place are at a higher risk for ectopic pregnancy should a pregnancy occur with an IUD in situ. There is a higher rate of miscarriage when pregnancy occurs with IUD in place.    Warning signs: Please call clinic if you have abnormal spotting or heavy bleeding, abdominal pain, dyspareunia, fever, chills, flu like symptoms, or unable to locate strings of IUD, or strings are longer or shorter than expected.    You are encouraged to use condoms for prevention of STD. You may also need back  up contraception for 7 days with your IUD. You may use pain medications (ibuprofen) as needed for mild to moderate pain. Please follow-up in clinic in 4-6 weeks for IUD string check if unable to find strings or as directed by provider.

## 2023-10-09 NOTE — PROGRESS NOTES
Postpartum Visit:     Assessment:   Normal postpartum exam at    Lactating  Contraceptive counseling     Plan:   1. Adjustment to parenting, self care and importance of a support system discussed. Postpartum education given including: postpartum mood changes and postpartum depression.  Mental Health Resources shared via AVS  2. Return of fertility discussed. Plans Mirena IUD for contraception. Education given on this method. Resumption of intercourse reviewed with possible changes in libido and vaginal lubrication while nursing.  3. Postpartum physical activity discussed and encouraged.   4. Advised continuation of a prenatal or multivitamin, also Vitamin D3 2000 IU geltab daily and an omega 3 fatty acid supplement.  5. Breastfeeding support resource list shared via AVS.   6. IUD inserted today. See insertion note below. RTC for IUD check in 4-6 weeks and then routine health maintenance or sooner as needed after that.     NELDA Velazquez, SHAUNA    Subjective:   Cayla Lerner is a 33 year old female who presents for postpartum visit. She is 6 weeks postpartum following a NSVB.  I have fully reviewed the prenatal and intrapartum course. The delivery was at Term Her baby girl is named Aimee and weighed 8 lbs 2 oz at birth. Feels good about her birth experience.     Postpartum course has been stable. Baby's course has been stable. Baby is breastfeeding. Lochia ceased at 4 weeks postpartum.  Bowel function is normal. Bladder function is normal. She has not resumed intercourse. Desired contraception: IUD Mirena. Appetite is normal. Reports sleeping well. [place]  Depression Scale postpartum depression screening score:     [place] Pp Depression Scale    10/8/2023  9:19 PM CDT - Filed by Patient   I have been able to laugh and see the funny side of things. As much as I always could   I have looked forward with enjoyment to things. As much as I ever did   I have blamed myself unnecessarily when  "things went wrong. No, never   I have been anxious or worried for no good reason. No, not at all   I have felt scared or panicky for no good reason. No, not much   Things have been getting on top of me. No, I have been coping as well as ever   I have been so unhappy that I have had difficulty sleeping. Not at all   I have felt sad or miserable. No, not at all   I have been so unhappy that I have been crying. No, never   The thought of harming myself has occurred to me. Never     She has resumed regular exercise. Patient returns to work in 6 weeks.    Review of Systems:  -A 12 point comprehensive review of systems was negative except as noted above.  -Prenatal history and intrapartum course were also reviewed today.    Cervical Cancer Screening History:  Last Pap: 4/2022. Results were: NILM.  HPV: neg.     Objective:     Vitals:    10/09/23 0834   BP: 108/62   Weight: 66.2 kg (146 lb)   Height: 1.676 m (5' 6\")       Physical Exam:  General Appearance: Pleasant, articulate, well-groomed, well-nourished female.  Not in any apparent distress.   Breasts: Engorgement resolved/Lactating.  Nipples intact with no cracking.  Abdomen: Soft, non-tender. No masses.   1 fb diastasis present.   Pelvic: External genitalia normal without lesions or irritation. Vagina and cervix show no lesions, inflammation, discharge or tenderness. All tissues well approximated and well healed. Uterus fully involuted.  No adnexal mass or tenderness.   Extremities: Extremities normal without varicosities or edema               IUD Insertion Procedure Note     Pre-operative Diagnosis: desires IUD contraception     Post-operative Diagnosis: normal     Indications: contraception     HPI/ROS:  Calya Lerner is a 33 year old female who presents for IUD insert. Current birth control method abstinence- they have not had sex since the birth. She is currently 6 weeks postpartum. 6 week postpartum exam today, IUD consult done at that time. Reports no " unprotected intercourse in the last two weeks. Urine pregnancy test was done today and was negative .      Patient was given an opportunity to ask questions about all forms of birth control, meaning all prescriptions, non-prescription, and natural methods. All of her questions were answered to her satisfaction and she understood all of those answers.  She understands that no method of birth control, except abstinence, is 100% effective against pregnancy or jorge sexually transmitted diseases, including Human Immunodeficiency Virus (HIV) infection that leads to the Acquired Immunodeficiency Syndrome (AIDS) disease. The following benefits, risk/side effects, warning signs, control method, intrauterine decisions to discontinue use option, regarding the birth control method, intrauterine device, were explained to her before she voluntarily decided to use this method of birth control.  No contraindications to Mirena IUD:   No untreated pelvic infection now   Has not had a serious pelvic infection in the past 3 months after a pregnancy   Does not have CA of uterus or cervix  No unexplained uterine or vaginal bleeding  No liver disease or a liver tumor   No breast cancer or any other cancer that is sensitive to progestin   Has no condition that changes the shape of the uterus  Not allergic to levonorgestrel silicone, polyethylene, silica, barium sulfate or iron oxide     BENEFITS: The IUD is 97-99% effective if all the directions regarding its use are followed carfeully. It can be more effective if used with foam or condoms mid-cycle between periods. IUDs containing progestin may decrease menstrual flow and painful menstrual periods. The IUD provides longer protection from pregnancy (Paragard- 10 years; Mirena - 5 years)  RISKS/ SIDE EFFECTS  Spotting, bleeding, hemorrhage or anemia  Cramping or pain  Partial or complete expulsion of device leading to pregnancy, the pregnancy ending in miscarriage  Lost IUD string or  other string problems  Puncturing of the uterus, embedding, or cervical perforation  Increased risk of pelvic inflammatory disease  WARNING SIGNS: The patient was advised  to call the clinic if she has any of the following early warning signs:  P - Period late (pregnancy), abnormal spotting or bleeding  A - Abdominal pain, pain with intercourse  I - Infection exposure (such as gonorrhea), abnormal discharge  N - Not feeling well, fever, chills  S - String missing, shorter or longer  ALTERNATIVES: She received written information about other methods of birth control and she chose the IUD.  She understands that she should check for the IUD strings several times during the first few months after insertion and then after each monthly period or before intercourse.   DECISION TO DISCONTINUE USE: She understands that she may have the IUD removed at any time. She knows not try to remove the device and that it should be removed only by a medical provider. If she does not desire to become pregnant, she has been told she may request to have another IUD inserted or choose to use another method of birth control. If she wishes to become pregnant, she understands most women not using birth control become pregnant within 12 months.  Procedure Details:   Bimanual exam performed revealing a midlie uterus, midline, non-tender, negative CMT. Cervix is parous, long, thick, closed. Sterile speculum placed.  Cervix cleansed with Betadine. Tenaculum placed on cervix at 10 o'clock and 2 o'clock. Uterus sounded to 7 cm. IUD inserted without difficulty. String visible and trimmed to 3 cm. Tenaculum removed. Minimal bleeding noted. Patient tolerated procedure well. Did not have any periods of syncope, sat up and ambulated with ease.     IUD Information:  Mirena, Expiration date 10/2025.  Lot # [id number]    Condition:  Stable    Complications:  None    Plan:  -Discussed danger signs and symptoms of the IUD including how to check for strings.  Instructed patient to check her strings monthly. Discussed when/where to call with any fever, severe back pain, severe abdominal pain, heavy bleeding (soaking more than 1 pad per hour). Also encouraged to call if she does not feel her strings. She was advised to use Ibuprofen as needed for mild to moderate pain. Manufactures information given for patient education.   - Discussed that IUD contraception does not protect against STIs and so condoms should be used for STI protection in situations where she may be exposed.   - Encouraged nothing in the vagina for 24 hours.   -Mirena is effective against pregnancy immediately if inserted within 7 days after the start of period. Encouraged she use another form of contraception for first 7 days after insertion. Protection against pregnacy will begin after 7 days.   -Mirena should be removed by 10/9/2031  -Encouraged to return to clinic in 4-6 weeks for IUD check or sooner prn.     Attending Physician Documentation:  I was present for or participated in the entire procedure, including opening and closing.

## 2023-10-17 ENCOUNTER — IMMUNIZATION (OUTPATIENT)
Dept: FAMILY MEDICINE | Facility: CLINIC | Age: 33
End: 2023-10-17
Payer: COMMERCIAL

## 2023-10-17 PROCEDURE — 90471 IMMUNIZATION ADMIN: CPT

## 2023-10-17 PROCEDURE — 99207 PR NO CHARGE NURSE ONLY: CPT

## 2023-10-17 PROCEDURE — 91320 SARSCV2 VAC 30MCG TRS-SUC IM: CPT

## 2023-10-17 PROCEDURE — 90682 RIV4 VACC RECOMBINANT DNA IM: CPT

## 2023-10-17 PROCEDURE — 90480 ADMN SARSCOV2 VAC 1/ONLY CMP: CPT

## 2023-10-27 ENCOUNTER — MEDICAL CORRESPONDENCE (OUTPATIENT)
Dept: HEALTH INFORMATION MANAGEMENT | Facility: CLINIC | Age: 33
End: 2023-10-27
Payer: COMMERCIAL

## 2023-11-01 ENCOUNTER — OFFICE VISIT (OUTPATIENT)
Dept: MIDWIFE SERVICES | Facility: CLINIC | Age: 33
End: 2023-11-01
Payer: COMMERCIAL

## 2023-11-01 VITALS
SYSTOLIC BLOOD PRESSURE: 102 MMHG | DIASTOLIC BLOOD PRESSURE: 60 MMHG | WEIGHT: 146 LBS | HEART RATE: 86 BPM | BODY MASS INDEX: 23.57 KG/M2 | OXYGEN SATURATION: 98 %

## 2023-11-01 DIAGNOSIS — Z30.433 ENCOUNTER FOR REMOVAL AND REINSERTION OF INTRAUTERINE CONTRACEPTIVE DEVICE: ICD-10-CM

## 2023-11-01 DIAGNOSIS — N89.8 VAGINAL DISCHARGE: ICD-10-CM

## 2023-11-01 DIAGNOSIS — N90.89 LABIAL IRRITATION: Primary | ICD-10-CM

## 2023-11-01 LAB
CLUE CELLS: ABNORMAL
TRICHOMONAS, WET PREP: ABNORMAL
WBC'S/HIGH POWER FIELD, WET PREP: ABNORMAL
YEAST, WET PREP: ABNORMAL

## 2023-11-01 PROCEDURE — 58301 REMOVE INTRAUTERINE DEVICE: CPT | Performed by: ADVANCED PRACTICE MIDWIFE

## 2023-11-01 PROCEDURE — 87210 SMEAR WET MOUNT SALINE/INK: CPT | Performed by: ADVANCED PRACTICE MIDWIFE

## 2023-11-01 PROCEDURE — 58300 INSERT INTRAUTERINE DEVICE: CPT | Performed by: ADVANCED PRACTICE MIDWIFE

## 2023-11-01 PROCEDURE — 99213 OFFICE O/P EST LOW 20 MIN: CPT | Mod: 25 | Performed by: ADVANCED PRACTICE MIDWIFE

## 2023-11-01 NOTE — PROGRESS NOTES
IUD PROBLEM VISIT: REMOVAL AND REINSERTION    SUBJECTIVE:     Cayla Lerner is a 33 year old  female who presents due to painful intercourse after IUD placement.  Has had IUD previously without painful intercourse.  Notes pain with intercourse in certain positions, is sharp, rates 5/10 pain.  Discomfort is only with intercourse.  Undecided if prefers replacement, yet desires to enjoy pain free intercourse again.  Has had spotting past month.  Agreeable to wet prep collection and physical exam.  Due to exam findings, agreeable to removal and replacement today.  Consult done and consent signed.    Cayla was given an opportunity to ask questions about all forms of birth control, meaning all prescriptions, non-prescription, and natural methods. All of her questions were answered to her satisfaction and she understood all of those answers. She understands that no method of birth control, except abstinence, is 100% effective against pregnancy or jorge sexually transmitted diseases, including Human Immunodeficiency Virus (HIV) infection that leads to the Acquired Immunodeficiency Syndrome (AIDS) disease. The following benefits, risk/side effects, warning signs, control method, intrauterine decisions to discontinue use option, regarding the birth control method, intrauterine device, were explained to her before she voluntarily decided to use this method of birth control.      BENEFITS: The IUD is 97-99% effective if all the directions regarding its use are followed carfeully. It can be more effective if used with foam or condoms mid-cycle between periods. IUDs containing progestin may decrease menstrual flow and painful menstrual periods. The IUD provides longer protection from pregnancy (Paragard- 10 years; Mirena - 8 years; Kyleena - 5 years; Evelyn - 3 years)      RISKS/ SIDE EFFECTS      1. Spotting, bleeding, hemorrhage or anemia      2. Cramping or pain      3. Partial or complete expulsion of device  leading to pregnancy, the pregnancy ending in miscarriage      4. Lost IUD string or other string problems      5. Puncturing of the uterus, embedding, or cervical perforation      6. Increased risk of pelvic inflammatory disease      WARNING SIGNS: The patient was advised to call the clinic if she has any of the following early warning signs:      P - Period late (pregnancy), abnormal spotting or bleeding      A - Abdominal pain, pain with intercourse      I - Infection exposure (such as gonorrhea), abnormal discharge      N - Not feeling well, fever, chills      S - String missing, shorter or longer      ALTERNATIVES: She received written information about other methods of birth control and she chose the IUD. She understands that she should check for the IUD strings several times during the first few months after insertion and then after each monthly period or before intercourse.      DECISION TO DISCONTINUE USE: She understands that she may have the IUD removed at any time. She knows not try to remove the device and that it should be removed only by a medical provider. If she does not desire to become pregnant, she has been told she may request to have another IUD inserted or choose to use another method of birth control. If she wishes to become pregnant, she understands most women not using birth control become pregnant within 12 months.     Review of Systems   Pertinent items are noted in HPI.          OBJECTIVE:   /60 (BP Location: Right arm, Patient Position: Sitting, Cuff Size: Adult Regular)   Pulse 86   Wt 66.2 kg (146 lb)   LMP 11/17/2022 (Exact Date)   SpO2 98%   Breastfeeding Yes   BMI 23.57 kg/m       Pelvic Exam:  Normal hair distribution.  Both labia majora and minora with pink irritated skin.  Wet prep collected prior to speculum exam.  Cervix and vagina visually WNL, with normal discharge.  IUD strings 4-5 cm in length.  Lower uterine segment discomfort on palpation with large swab,  particularly posterior fornix of vagina.  Mild tenderness on cervical palpation.    IUD Insertion Procedure Note     Pre-operative Diagnosis: Desires IUD removal and re-insertion of another Mirena IUD     Post-operative Diagnosis: same     Indications: Contraception    Procedure Details      Uterus, slightly tilted to maternal left, non-tender, negative CMT. Cervix is parous, long, thick, closed. Sterile speculum placed.  Previous Mirena IUD strings grasped with ring forceps and easily removed without incident.  Offered and declines GC/CT.   Cervix and vaginal walls cleansed with Betadine.  Uterus sounded to 7.5 cm. IUD inserted without difficulty. String visible and trimmed to 3 cm. Minimal bleeding noted. Patient tolerated procedure well. Did not have any periods of syncope, sat up and ambulated with ease.      IUD Information:   Mirena.   Lot # [id number], Exp 2025/OCT      Condition:  Stable     Complications:   None         ASSESSMENT:   33 year old,    Mirena IUD removal and re-insertion.          PLAN:   1.  Reviewed, witnessed and signed IUD removal and re-insertion consent form and reviewed alternative options for BCM available. Patient desires IUD insertion today.      2. Reassurance re: correct placement, normalcy of side effects and that these often lessen with extended use of IUD.      3.  Recommended taking ibuprofen PRN.       4.  Encouraged to check IUD strings monthly.       5.  Warning signs related to IUD use (PAINS) and when to call CNM reviewed  including to call with any fever, severe back pain, severe abdominal pain, heavy bleeding (soaking more than 1 pad per hour)     6. Return to clinic in 4 weeks for IUD string check, sooner as needed.             Time spent:  Chart review/Pre-chartin min day of service  Face-to-face visit: 28 min counseling and education  Documentation: 10 min  Total time spent on day of service: 43 min      NELDA Ibrahim CNM      2023   1:26 PM

## 2023-11-01 NOTE — PATIENT INSTRUCTIONS
"Hello!       Below are some tips for ideal feminine hygiene, to avoid perineal and vaginal infections/imbalances/skin irritations.    [place] Feminine Hygiene:    Bathing and Hygiene    #1- Bathing/Cleaning  Use only water for cleaning.  If you have a removable shower head, you can direct the water straight toward your perineal/private area to clean by rinsing well with warm water.  Never douche or use soaps on your perineal area.  These wash away your body's oils, which have natural antibiotic properties, keeping the \"bad\" bacteria away and promoting \"good\" bacteria.  For the rest of your body, use a gentle non scented soap such as Dove for Sensitive Skin, Neutrogena, Basis, Aveeno, or Pears.  If you have a partner, have them use one of these soaps as well.  Do not scrub the vulvar skin with a wash cloth.  If your vulvar area is itching, burning, or otherwise irritated, try a baking soda soak.  Use lukewarm (not hot) bath water with 4-5 tablespoons of baking soda to help soothe the skin.  Soak 1-3 times a day for 10 minutes.  If you use a sitz bath, decrease baking soda to 1-2 teaspoons.  After bathing, pat the vulvar/private area dry with a towel- do not rub the skin.  Do not use lotions, gels, ointments, creams, feminine hygiene sprays, or perfumes.  These can be irritating, especially if they contain perfumes.  Small amounts of coconut oil, zinc oxide ointment, or plan petroleum jelly may be applied to your vulva to protect the skin and decrease irritation during your period or menstrual bleeding.  Do not shave with a razor, wax, or use hair removal products on the vulvar area.  You may use scissors or electric clippers to trim the pubic hair close to the vulva.  Laser hair removal is a safe option.   .  #2- Toileting/Menstruation  If urine causes burning of the skin, pour/spray lukewarm water over the vulva/urethra while urinating.  Use white, unscented toilet paper.  Do not use toilet paper with aloe or other " lotions.  Pat dry rather than wiping.  Do not use adult or baby wipes.  These can be irritating, especially if they contain perfumes.   You can use Tucks pads if needed or desired.  Witch Hazel is helpful and healing for skin tissue, and can soothe irritation.  Do not use deodorized pads or tampons.  Only use tampons when the blood flow is heavy enough to soak one tampon in four hours or less.  Wearing them too long or when the blood flow is light may result in vaginal infection, increased discharge, unpleasant odor, or toxic shock syndrome.  Use only pads with a cotton liner, such as Stayfree, Shiloh, or 7th Generation.  Pads with nylon mesh weave traps moisture and keeps irritating blood and discharge against your skin longer.  Consider trying cotton (reusable) pads to see if they are less irritating.      #3- Daily care.   Keep your perineal area cool and dry as possible, as warm, wet environments favor undesirable yeast/bacteria/skin irritation.    Use cotton fabrics (underwear, pantyliners, pads) whenever possible.  Only wear 100% cotton underwear (or 100% cotton liner at a minimum) as this material provides better air circulation, allowing air in and moisture out.  Do not wear thongs.  Do not wear underwear to bed at night as this also allows for better air circulation (pajama bottoms or loose boxers are okay as they are loose and let air flow).  During the day, keep an extra pair of underwear with you, and change if you become damp.  Gold Luevano or Zeasorb may be applied to the vulva and groin area 1-2 times per day to help absorb moisture.  Do not use powders that contain cornstarch or talcum powder.  Rinse your private area off with warm water and pat dry with a towel any time the area becomes warm and damp (such as after exercise, or on a hot, humid day).  Remove wet bathing and exercise clothing as soon as you can.  Avoid tight clothing, especially made of synthetic fabrics.  Do not wear pads on a daily  "basis if possible.  Avoid pantyhose. If you must wear them, cut the ty crotch out being sure to leave enough fabric to prevent the seam from running, or wear thigh high hose.  Dryness and irritation during intercourse can be helped by using a sterile lubricant.  Avoid water based lubricants as these tend to dry out before intercourse is over, causing small tears in the vagina and/or irritation of vulvar skin.  If needed, use coconut oil or name brand Slippery Stuff for lubrication, which will also help keep semen off the skin and decrease burning and irritation with intercourse.     #4- Birth Control  If using condoms, use ones that do not come lubricated and do not contain spermicides.  Lubricated condoms, contraceptive jellies, creams, or sponges may all cause itching and burning.  If needed, use coconut oil or name brand Slippery Stuff for lubricating dry condoms, which will also decrease burning and irritation with intercourse.  Avoid petroluem-based lubricants as these may affect the integrity of condoms and increase chance of pregnancy and sexually transmitted infections.  If desiring oral birth control pills, consider low-dose as these do not increase your chances of getting a yeast infection.    #5- Laundry  Use detergent free and clear of dyes and perfumes on all laundry that goes into your washer, every load, every time.  No substitutions.  Use 1/3-1/2 suggested amount of soap per load.  Do not use fabric softeners or sheets in the washer or dryer, even those advertised as \"free\".  If you use a shred washer/dryer such as laundromat, apartment, or dorm hand wash and line dry your underwear.  Use morales balls to help soften clothes.  Avoid stain removing products, including bleach, especially on towels and underwear.  If you must use stain removers, soak and rinse in clear water anything which has had a stain removing product on it.  Then wash in regular washing cycle using detergent free and clear of " dyes and perfumes to remove as much of the product as possible.  White vinegar or lemon juice, 1/4-/13 cup per load of laundry, can be used to freshen clothes and remove oils.    #6- If you desire, you can try oral or vaginal probiotics.  We don't know for sure if this promotes healthy vaginal mariah (bacteria), but it may be beneficial, and is unlikely to be harmful.  I recommend Fem Dophilis one tablet per day.    Hope this helps!    NELDA Ibrahim CNM  11/1/2023  10:37 AM      BENEFITS: The IUD is 97-99% effective if all the directions regarding its use are followed carfeully. It can be more effective if used with foam or condoms mid-cycle between periods. IUDs containing progestin may decrease menstrual flow and painful menstrual periods. The IUD provides longer protection from pregnancy (Evelyn 3 years, Kyleena 5 years, Mirena 8 years, Para Guard 10 years)     RISKS/SIDE EFFECTS:  Spotting, bleeding, hemorrhage or anemia   Cramping or pain   Partial or complete expulsion of device leading to pregnancy, the pregnancy ending in miscarriage   Lost IUD string or other string problems   Puncturing of the uterus, embedding, or cervical perforation   Increased risk of pelvic inflammatory disease     No method of birth control, except abstinence, is 100% effective against pregnancy or jorge sexually transmitted diseases, including Human Immunodeficiency Virus (HIV) infection that leads to the Acquired Immunodeficiency Syndrome (AIDS) disease.    WARNING SIGNS: Call the clinic if you have any of the following early warning signs:   P - Period late (pregnancy), abnormal spotting or bleeding   A - Abdominal pain, pain with intercourse   I - Infection exposure (such as gonorrhea), abnormal discharge   N - Not feeling well, fever, chills   S - String missing, shorter or longer     INSTRUCTIONS: Check for the IUD strings several times during the first few months after insertion and thereafter every month or  before intercourse.     DECISION TO DISCONTINUE USE: You may have the IUD removed at any time.  Do not try to remove the device yourself.  It should be removed only by a medical provider. If you do not desire to become pregnant, you may request to have another IUD inserted or choose to use another method of birth control.  If you wish to become pregnant, most women not using birth control become pregnant within 12 months.    As we discussed today, please return to the clinic in 4 weeks to have you IUD strings checked.    Please send a [employer] message or call the clinic at [phone] with any questions or concerns.

## 2023-11-29 ENCOUNTER — OFFICE VISIT (OUTPATIENT)
Dept: MIDWIFE SERVICES | Facility: CLINIC | Age: 33
End: 2023-11-29
Payer: COMMERCIAL

## 2023-11-29 VITALS
HEART RATE: 66 BPM | BODY MASS INDEX: 23.57 KG/M2 | SYSTOLIC BLOOD PRESSURE: 104 MMHG | DIASTOLIC BLOOD PRESSURE: 66 MMHG | WEIGHT: 146 LBS

## 2023-11-29 DIAGNOSIS — Z30.431 IUD CHECK UP: Primary | ICD-10-CM

## 2023-11-29 PROCEDURE — 99213 OFFICE O/P EST LOW 20 MIN: CPT | Performed by: ADVANCED PRACTICE MIDWIFE

## 2023-11-29 NOTE — PROGRESS NOTES
IUD CHECK      Subjective:      Cayla Lerner is a 33 year old female who presents for IUD check. She had a Mirena IUD replaced on 2023. Reports no complaints since insertion. The pain she was previously experiencing with intercourse is now resolved.    Review of Systems  Pertinent items are noted in HPI.        Objective:     /66 (BP Location: Right arm, Patient Position: Sitting, Cuff Size: Adult Regular)   Pulse 66   Wt 66.2 kg (146 lb)   LMP  (LMP Unknown)   BMI 23.57 kg/m      Physical Exam:  General: Pleasant, articulate, well-groomed, well-nourished female.  Not in any apparent distress.  External genitalia: Normal hair distribution, no lesions.  Of note, her labia and perineum is still hot pink.   Urethral opening: Without lesions or discharge. No tenderness.   Bladder: Without masses, or tenderness.  Vagina: Pink, rugated, normal-appearing discharge.  Cervix: parous, pink, smooth, no lesions. IUD strings visualized and 3 cm in length.         Assessment:     Intrauterine device correctly inserted       Plan:   -Discussed danger signs and symptoms of the IUD including how to check for strings. Instructed patient to check her strings monthly. Encouraged to call if she does not feel her strings, or they seem longer than expected.   -Mirena IUD should be removed by 2031  -Advised purchasing OTC Monistat for external use to see if this helps with perineal skin irritation.    Time spent:  Chart review/Pre-chartin min day of service  Face-to-face visit: 10 min counseling and education  Documentation: 5 min  Total time spent on day of service: 20 min      NELDA Ibrahim CNM   2023 8:52 AM

## 2023-11-29 NOTE — PATIENT INSTRUCTIONS
BENEFITS: The IUD is 97-99% effective if all the directions regarding its use are followed carfeully. It can be more effective if used with foam or condoms mid-cycle between periods. IUDs containing progestin may decrease menstrual flow and painful menstrual periods. The IUD provides longer protection from pregnancy (Evelyn 3 years, Kyleena 5 years, Mirena 8 years, Para Guard 10 years)     RISKS/SIDE EFFECTS:  Spotting, bleeding, hemorrhage or anemia   Cramping or pain   Partial or complete expulsion of device leading to pregnancy, the pregnancy ending in miscarriage   Lost IUD string or other string problems   Puncturing of the uterus, embedding, or cervical perforation   Increased risk of pelvic inflammatory disease     No method of birth control, except abstinence, is 100% effective against pregnancy or jorge sexually transmitted diseases, including Human Immunodeficiency Virus (HIV) infection that leads to the Acquired Immunodeficiency Syndrome (AIDS) disease.    WARNING SIGNS: Call the clinic if you have any of the following early warning signs:   P - Period late (pregnancy), abnormal spotting or bleeding   A - Abdominal pain, pain with intercourse   I - Infection exposure (such as gonorrhea), abnormal discharge   N - Not feeling well, fever, chills   S - String missing, shorter or longer     INSTRUCTIONS: Check for the IUD strings several times during the first few months after insertion and thereafter every month or before intercourse.     DECISION TO DISCONTINUE USE: You may have the IUD removed at any time.  Do not try to remove the device yourself.  It should be removed only by a medical provider. If you do not desire to become pregnant, you may request to have another IUD inserted or choose to use another method of birth control.  If you wish to become pregnant, most women not using birth control become pregnant within 12 months.    As we discussed today, please return to the clinic in 4 weeks to  "have you IUD strings checked.    Please send a [employer] message or call the clinic at [phone] with any questions or concerns.       Hello!       Below are some tips for ideal feminine hygiene, to avoid perineal and vaginal infections/imbalances/skin irritations.    [facility] Feminine Hygiene:    Bathing and Hygiene    #1- Bathing/Cleaning  Use only water for cleaning.  If you have a removable shower head, you can direct the water straight toward your perineal/private area to clean by rinsing well with warm water.  Never douche or use soaps on your perineal area.  These wash away your body's oils, which have natural antibiotic properties, keeping the \"bad\" bacteria away and promoting \"good\" bacteria.  For the rest of your body, use a gentle non scented soap such as Dove for Sensitive Skin, Neutrogena, Basis, Aveeno, or Pears.  If you have a partner, have them use one of these soaps as well.  Do not scrub the vulvar skin with a wash cloth.  If your vulvar area is itching, burning, or otherwise irritated, try a baking soda soak.  Use lukewarm (not hot) bath water with 4-5 tablespoons of baking soda to help soothe the skin.  Soak 1-3 times a day for 10 minutes.  If you use a sitz bath, decrease baking soda to 1-2 teaspoons.  After bathing, pat the vulvar/private area dry with a towel- do not rub the skin.  Do not use lotions, gels, ointments, creams, feminine hygiene sprays, or perfumes.  These can be irritating, especially if they contain perfumes.  Small amounts of coconut oil, zinc oxide ointment, or plan petroleum jelly may be applied to your vulva to protect the skin and decrease irritation during your period or menstrual bleeding.  Do not shave with a razor, wax, or use hair removal products on the vulvar area.  You may use scissors or electric clippers to trim the pubic hair close to the vulva.  Laser hair removal is a safe option.   .  #2- Toileting/Menstruation  If urine causes burning of the skin, pour/spray " lukewarm water over the vulva/urethra while urinating.  Use white, unscented toilet paper.  Do not use toilet paper with aloe or other lotions.  Pat dry rather than wiping.  Do not use adult or baby wipes.  These can be irritating, especially if they contain perfumes.   You can use Tucks pads if needed or desired.  Witch Hazel is helpful and healing for skin tissue, and can soothe irritation.  Do not use deodorized pads or tampons.  Only use tampons when the blood flow is heavy enough to soak one tampon in four hours or less.  Wearing them too long or when the blood flow is light may result in vaginal infection, increased discharge, unpleasant odor, or toxic shock syndrome.  Use only pads with a cotton liner, such as Stayfree, Ione, or 7th Generation.  Pads with nylon mesh weave traps moisture and keeps irritating blood and discharge against your skin longer.  Consider trying cotton (reusable) pads to see if they are less irritating.      #3- Daily care.   Keep your perineal area cool and dry as possible, as warm, wet environments favor undesirable yeast/bacteria/skin irritation.    Use cotton fabrics (underwear, pantyliners, pads) whenever possible.  Only wear 100% cotton underwear (or 100% cotton liner at a minimum) as this material provides better air circulation, allowing air in and moisture out.  Do not wear thongs.  Do not wear underwear to bed at night as this also allows for better air circulation (pajama bottoms or loose boxers are okay as they are loose and let air flow).  During the day, keep an extra pair of underwear with you, and change if you become damp.  Gold Luevano or Zeasorb may be applied to the vulva and groin area 1-2 times per day to help absorb moisture.  Do not use powders that contain cornstarch or talcum powder.  Rinse your private area off with warm water and pat dry with a towel any time the area becomes warm and damp (such as after exercise, or on a hot, humid day).  Remove wet  "bathing and exercise clothing as soon as you can.  Avoid tight clothing, especially made of synthetic fabrics.  Do not wear pads on a daily basis if possible.  Avoid pantyhose. If you must wear them, cut the ty crotch out being sure to leave enough fabric to prevent the seam from running, or wear thigh high hose.  Dryness and irritation during intercourse can be helped by using a sterile lubricant.  Avoid water based lubricants as these tend to dry out before intercourse is over, causing small tears in the vagina and/or irritation of vulvar skin.  If needed, use coconut oil or name brand Slippery Stuff for lubrication, which will also help keep semen off the skin and decrease burning and irritation with intercourse.     #4- Birth Control  If using condoms, use ones that do not come lubricated and do not contain spermicides.  Lubricated condoms, contraceptive jellies, creams, or sponges may all cause itching and burning.  If needed, use coconut oil or name brand Slippery Stuff for lubricating dry condoms, which will also decrease burning and irritation with intercourse.  Avoid petroluem-based lubricants as these may affect the integrity of condoms and increase chance of pregnancy and sexually transmitted infections.  If desiring oral birth control pills, consider low-dose as these do not increase your chances of getting a yeast infection.    #5- Laundry  Use detergent free and clear of dyes and perfumes on all laundry that goes into your washer, every load, every time.  No substitutions.  Use 1/3-1/2 suggested amount of soap per load.  Do not use fabric softeners or sheets in the washer or dryer, even those advertised as \"free\".  If you use a shred washer/dryer such as laundromat, apartment, or dorm hand wash and line dry your underwear.  Use morales balls to help soften clothes.  Avoid stain removing products, including bleach, especially on towels and underwear.  If you must use stain removers, soak and rinse in " clear water anything which has had a stain removing product on it.  Then wash in regular washing cycle using detergent free and clear of dyes and perfumes to remove as much of the product as possible.  White vinegar or lemon juice, 1/4-/13 cup per load of laundry, can be used to freshen clothes and remove oils.    #6- If you desire, you can try oral or vaginal probiotics.  We don't know for sure if this promotes healthy vaginal mariah (bacteria), but it may be beneficial, and is unlikely to be harmful.  I recommend Fem Dophilis one tablet per day.    Hope this helps!    NELDA Ibrahim CNM  11/29/2023  4:29 PM

## 2024-02-24 ENCOUNTER — HEALTH MAINTENANCE LETTER (OUTPATIENT)
Age: 34
End: 2024-02-24

## 2024-12-08 NOTE — PROGRESS NOTES
Subjective:     Cayla Lerner is a 34 year old female who presents for an annual exam. She is a return [facility] patient.   Using Mirena IUD for contraception, denies concerns. Desires IUD string check today.      Family:   Work:  Other:     Healthy Habits:   Exercise: yes  Safety (seatbelt, gun access, IPV, hx abuse): feels safe  Sexual Partners: 1  Last Breast Exam: monthly     Lipid Profile: due next year  Glucose Screen: due next year      Immunization History   Administered Date(s) Administered    COVID-19 12+ (Pfizer) 10/17/2023    COVID-19 Bivalent 12+ (Pfizer) 10/31/2022    COVID-19 Monovalent 18+ (Moderna) 2021    COVID-19 Vaccine (Emily) 03/10/2021    Influenza (High Dose) Trivalent,PF (Fluzone) 2022    Influenza Vaccine 18-64 (Flublok) 10/17/2023    Influenza Vaccine >6 months,quad, PF 10/30/2017, 2018, 10/31/2019, 2021    Influenza, Split Virus, Trivalent, Pf (Fluzone\Fluarix) 10/29/2024    TDAP (Adacel,Boostrix) 2017, 2018, 2021, 2023     Immunization status: up to date and documented.    Gynecologic History  No LMP recorded (lmp unknown).  Contraception: IUD    Cancer screening:   Last Pap: 2022. Results were: NIL and HPV Negative    Last mammogram: never.       OB History    Para Term  AB Living   4 4 4 0 0 4   SAB IAB Ectopic Multiple Live Births   0 0 0 0 4      # Outcome Date GA Lbr Sixto/2nd Weight Sex Type Anes PTL Lv   4 Term 23 40w4d  3.685 kg (8 lb 2 oz) F Vag-Spont None N TERRY      Name: NANI,FEMALE-CAYLA      Apgar1: 8  Apgar5: 9   3 Term 21 40w3d 04:13 / 00:07 3.941 kg (8 lb 11 oz) M Vag-Spont None N TERRY      Complications: Dysfunctional Labor      Name: Ahmet      Apgar1: 9  Apgar5: 9   2 Term 19 39w6d 01:24  00:07 3 kg (6 lb 9.8 oz) F Vag-Spont None N TERRY      Name: Florina      Apgar1: 9  Apgar5: 9   1 Term 17 40w1d  3.345 kg (7 lb 6 oz) F Vag-Spont  Nitrous  TERRY      Birth Comments: 20 min 2nd stage      Name: Sonya      Apgar1: 9  Apgar5: 9       Current Outpatient Medications   Medication Sig Dispense Refill    levonorgestrel (MIRENA) 52 MG (20 mcg/day) IUD 1 each by Intrauterine route once       Past Medical History:   Diagnosis Date    Acute superficial venous thrombosis of lower extremity 2017    with varicose vein in pregnancy    Headache     tension type, comes and goes, infrequent    Hx of varicella     had chicken pox as a child    Preeclampsia     During second labor and birth     Past Surgical History:   Procedure Laterality Date    WISDOM TOOTH EXTRACTION       Patient has no known allergies.  Family History   Problem Relation Age of Onset    Depression Mother         on meds    Alcoholism Father     Mental Illness Father     No Known Problems Sister     No Known Problems Sister     Depression Brother         sometimes on meds    Bipolar Disorder Brother     No Known Problems Brother     Breast Cancer Maternal Grandmother 60        mets to bone    Depression Maternal Grandmother     Arthritis Maternal Grandfather     Hearing Loss Maternal Grandfather     Alcoholism Maternal Grandfather     Vision Loss Paternal Grandmother     Alcoholism Paternal Grandfather     Hypertension Paternal Grandfather     Alcoholism Maternal Uncle     Depression Maternal Uncle     Alcoholism Paternal Aunt     Alcoholism Paternal Uncle     Diabetes Type 1 Paternal Uncle     Depression Maternal Aunt      Social History     Socioeconomic History    Marital status:      Spouse name: Davon    Number of children: 3    Years of education: College grad    Highest education level: Not on file   Occupational History    Not on file   Tobacco Use    Smoking status: Never     Passive exposure: Never    Smokeless tobacco: Never   Substance and Sexual Activity    Alcohol use: Not Currently     Alcohol/week: 1.0 standard drink of alcohol     Types: 1 Standard drinks or equivalent  "per week     Comment: Alcoholic Drinks/day: none while pregnant    Drug use: No    Sexual activity: Yes     Partners: Male     Birth control/protection: None   Other Topics Concern    Parent/sibling w/ CABG, MI or angioplasty before 65F 55M? No   Social History Narrative    Not on file     Social Drivers of Health     Financial Resource Strain: Not on file   Food Insecurity: Not on file   Transportation Needs: Not on file   Physical Activity: Not on file   Stress: Not on file   Social Connections: Not on file   Interpersonal Safety: Not on file   Housing Stability: Not on file       Review of Systems  General:  Denies problem  Eyes: Denies problem  Ears/Nose/Throat: Denies problem  Cardiovascular: Denies problem  Respiratory:  Denies problem  Gastrointestinal:  denies problems  Genitourinary: denies problems  Musculoskeletal:  Denies problem  Skin: Denies problem  Neurologic:denies problems  Psychiatric: denies problems  Endocrine: denies problems        Objective:      Vitals:    12/09/24 1555   BP: 99/62   Pulse: 63   SpO2: 96%   Weight: 58.7 kg (129 lb 4.8 oz)   Height: 1.676 m (5' 6\")       Physical Exam:  General Appearance: Alert, cooperative, no distress, appears stated age  Skin: Skin color, texture, turgor normal, no rashes or lesions.  Throat: Lips, mucosa, and tongue normal; teeth and gums normal  HEENT: grossly normal; otoscopic and opthalmic exam not performed.   Neck: Supple, symmetrical, trachea midline, no adenopathy;  thyroid: not enlarged, symmetric, no tenderness/mass/nodules  Lungs: Clear to auscultation bilaterally, respirations unlabored  Breasts: No breast masses, tenderness, asymmetry, or nipple discharge .   Heart: Regular rate and rhythm, S1 and S2 normal, no murmur, rub, or gallop  Abdomen: Soft, non-tender. No organomegaly or masses  Pelvic:External genitalia normal without lesions or irritation. Vagina and cervix with normal discharge show no lesions, inflammation, or tenderness. No " cystocele, No rectocele. Uterus & adnexal normal without masses or tenderness IUD strings 3 cm in length    Assessment:     Healthy female exam.  Mirena IUD in place        Plan:      1. Reviewed nutrition and exercise.    2.  Labs: none  3. Breast awareness reviewed and patient encouraged to contact her provider with concerns.   4. Contraception: Mirena IUD  5. Pap smear not done at today's visit. Next due for cervical cancer screening April 2027 .   6.  RTC 1 year for annual physical exam, PRN    30 minutes on the date of the encounter doing chart review, patient visit, and documentation     Brianna LUTZ, CNM, WHNP

## 2024-12-09 ENCOUNTER — OFFICE VISIT (OUTPATIENT)
Dept: MIDWIFE SERVICES | Facility: CLINIC | Age: 34
End: 2024-12-09
Payer: COMMERCIAL

## 2024-12-09 VITALS
HEART RATE: 63 BPM | WEIGHT: 129.3 LBS | OXYGEN SATURATION: 96 % | DIASTOLIC BLOOD PRESSURE: 62 MMHG | SYSTOLIC BLOOD PRESSURE: 99 MMHG | HEIGHT: 66 IN | BODY MASS INDEX: 20.78 KG/M2

## 2024-12-09 DIAGNOSIS — Z01.419 ENCOUNTER FOR GYNECOLOGICAL EXAMINATION WITHOUT ABNORMAL FINDING: Primary | ICD-10-CM

## 2024-12-09 PROCEDURE — 99395 PREV VISIT EST AGE 18-39: CPT | Performed by: ADVANCED PRACTICE MIDWIFE
